# Patient Record
Sex: FEMALE | Race: WHITE | NOT HISPANIC OR LATINO | Employment: FULL TIME | URBAN - METROPOLITAN AREA
[De-identification: names, ages, dates, MRNs, and addresses within clinical notes are randomized per-mention and may not be internally consistent; named-entity substitution may affect disease eponyms.]

---

## 2017-03-27 ENCOUNTER — TELEPHONE (OUTPATIENT)
Dept: NEUROLOGY | Facility: CLINIC | Age: 32
End: 2017-03-27

## 2017-03-27 NOTE — TELEPHONE ENCOUNTER
----- Message from Vandana Betancourt sent at 3/27/2017  1:31 PM CDT -----  Contact: pt [phone] or [phone](dona)  Pt is calling to schedule a np appt.pls call

## 2017-03-27 NOTE — TELEPHONE ENCOUNTER
Patient was dx with MS in 2015. She is currently seeing Dr. Reyna.     I informed her that she should have office visit notes, MRI reports, and LP results (if available) faxed to this office. Fax number confirmed. I explained that we will call her to schedule once Dr Hernandez has had a chance to review the records. Verbalizes understanding of all.

## 2017-04-05 ENCOUNTER — DOCUMENTATION ONLY (OUTPATIENT)
Dept: NEUROLOGY | Facility: CLINIC | Age: 32
End: 2017-04-05

## 2017-04-05 NOTE — PROGRESS NOTES
Faxed signed consent Release of Medical Information to [facility] at [phone] on 4/5/17

## 2017-04-06 ENCOUNTER — TELEPHONE (OUTPATIENT)
Dept: NEUROLOGY | Facility: CLINIC | Age: 32
End: 2017-04-06

## 2017-04-06 NOTE — TELEPHONE ENCOUNTER
Fax received from [facility]-Dr Reyna with medical records. Placed in Kerri's folder for review. VM left for pt to let her know.

## 2017-04-26 ENCOUNTER — TELEPHONE (OUTPATIENT)
Dept: NEUROLOGY | Facility: CLINIC | Age: 32
End: 2017-04-26

## 2017-06-29 ENCOUNTER — TELEPHONE (OUTPATIENT)
Dept: NEUROLOGY | Facility: CLINIC | Age: 32
End: 2017-06-29

## 2017-06-29 NOTE — TELEPHONE ENCOUNTER
----- Message from Noemy Barnhart sent at 6/29/2017  8:28 AM CDT -----  Contact: Patient [phone]  Patient is calling to see if she can still come to her appt on Monday 7/3/17 without having her MRI results, patient states it is impossible to get in touch with her doctor. Please call

## 2017-07-03 ENCOUNTER — OFFICE VISIT (OUTPATIENT)
Dept: NEUROLOGY | Facility: CLINIC | Age: 32
End: 2017-07-03
Payer: COMMERCIAL

## 2017-07-03 VITALS
SYSTOLIC BLOOD PRESSURE: 113 MMHG | WEIGHT: 140.81 LBS | DIASTOLIC BLOOD PRESSURE: 76 MMHG | HEIGHT: 66 IN | HEART RATE: 67 BPM | BODY MASS INDEX: 22.63 KG/M2

## 2017-07-03 DIAGNOSIS — G35 MS (MULTIPLE SCLEROSIS): Primary | ICD-10-CM

## 2017-07-03 DIAGNOSIS — R90.89 ABNORMAL FINDING ON MRI OF BRAIN: ICD-10-CM

## 2017-07-03 DIAGNOSIS — Z29.89 PROPHYLACTIC IMMUNOTHERAPY: ICD-10-CM

## 2017-07-03 DIAGNOSIS — Z71.89 COUNSELING REGARDING ADVANCED CARE PLANNING AND GOALS OF CARE: ICD-10-CM

## 2017-07-03 PROCEDURE — 99999 PR PBB SHADOW E&M-EST. PATIENT-LVL II: CPT | Mod: PBBFAC,,, | Performed by: PSYCHIATRY & NEUROLOGY

## 2017-07-03 PROCEDURE — 99205 OFFICE O/P NEW HI 60 MIN: CPT | Mod: S$GLB,,, | Performed by: PSYCHIATRY & NEUROLOGY

## 2017-07-03 RX ORDER — CHOLECALCIFEROL (VITAMIN D3) 25 MCG
5000 TABLET ORAL DAILY
COMMUNITY

## 2017-07-03 NOTE — PROGRESS NOTES
"Neurology Consultation    History of present illness:   Ms Perez is a 33 y/o woman self referred to our MS clinic to establish care.     Her first neurological sx started about 5 years ago when she noticed left arm numbness during a run, that went away once she cooled off.  Then in 2013, she developed left LE numbness, tingling and drop foot that lasted one week. Went to ER, and was told she has pinched nerve.  In 2014, she had a miscarriage, and ended up having recurrent sx of LLE numbness and weakness that happened a few weeks after miscarriage.      In Feb 2015, she developed left sided numbness up to her rib cage, with some involvement in the right leg as well.  She had a severe gait disturbance at that time.  She went to [facility], and Brain and C spine MRIs were done which showed lesions typical of MS. No LP was required.  She was given steroids x 5 days, and her gait improved. [facility]u in [facility] and was started on Copaxone.  After 6 mo on Copaxone, pt had an episode vertigo/dizziness and new MRI showed an active lesion in brain, so it was recommended that she switch DMT. She was switched to Rebif in late 2015, and she has not had a relapse since.  Overall she tolerated this medication but does have IS reactions that are bothersome.     As for permanent impairment, she describes some chronic numbness and "tightness" in left LE.  She is still able to run for exercise.     She has intermittent urgency of bladder, but generally has control of her bladder. She also drinks a lot of water.     Pt states she has not had a period since she was diagnosed with MS.  Under the care of Dr. Alva at [facility] (GYN).      Pt works full time as     She is taking 800 IU of vitamin D      Review of systems:   A complete 15 system ROS was completed by pt, reviewed by me and will be uploaded into the chart.     No past medical history on file.    Past Surgical History:   Procedure Laterality Date    DNC  " "2014       Family History   Problem Relation Age of Onset    No Known Problems Mother     No Known Problems Father        Social History     Social History    Marital status:      Spouse name: N/A    Number of children: N/A    Years of education: N/A     Social History Main Topics    Smoking status: Never Smoker    Smokeless tobacco: Not on file    Alcohol use Not on file    Drug use: Unknown    Sexual activity: Not on file     Other Topics Concern    Not on file     Social History Narrative    No narrative on file       MEDS: reviewed    Review of patient's allergies indicates:   Allergen Reactions    Iodine and iodide containing products Rash     Physical Exam    Vitals:    07/03/17 1029   BP: 113/76   Pulse: 67   Weight: 63.9 kg (140 lb 12.8 oz)   Height: 5' 6" (1.676 m)       In general, the patient is well nourished.    No bruits. Fundi are normal bilaterally.    MENTAL STATUS: language is fluent, normal verbal comprehension, short-term and remote memory is intact, attention is normal, patient is alert and oriented x 3, fund of knowlege is appropriate by vocabulary.     CRANIAL NERVE EXAM:  There is no intrernuclear ophthalmoplegia.  Extraocular muscles are intact. Pupils are equal, round, and reactive to light. No facial asymmetry. Facial sensation is intact bilaterally. There is no dysarthria. Uvula is midline, and palate moves symmetrically. Shoulder shrug intact bilaterlly. Tongue protrusion is midline. Hearing is grossly intact. Neck is supple.     MOTOR EXAM: Normal bulk and tone throughout UE and LE bilaterally.   No pronator drift; rapid sequential movements are normal; Strength is  5/5 in all groups in the lower extremities and upper extremities;    REFLEXES: 2+ and symmetric throughout in all four extremeties; toes are down bilaterally    SENSORY EXAM: Normal to LT and vibration t/u    COORDINATION: Normal finger-to-nose exam     GAIT: Narrow based and stable      IMAGING " (personally reviewed):  MRI of brain, C and T spine reviewed, 11/2016  Brain MRI with mild burden of disease, jonathon negative, no black holes  C spine MRI normal to my eye  T spine MRI with only 2 small areas of increased signal;       LABS:  Lab Results   Component Value Date    WBC 8.44 07/03/2017    HGB 12.0 07/03/2017    HCT 35.0 (L) 07/03/2017    MCV 98 07/03/2017     07/03/2017     CMP  Sodium   Date Value Ref Range Status   02/20/2015 138 136 - 145 mmol/L Final     Potassium   Date Value Ref Range Status   02/20/2015 4.1 3.5 - 5.1 mmol/L Final     Chloride   Date Value Ref Range Status   02/20/2015 105 95 - 110 mmol/L Final     CO2   Date Value Ref Range Status   02/20/2015 20 (L) 23 - 29 mmol/L Final     Glucose   Date Value Ref Range Status   02/20/2015 78 70 - 110 mg/dL Final     BUN, Bld   Date Value Ref Range Status   02/20/2015 6 6 - 20 mg/dL Final     Creatinine   Date Value Ref Range Status   02/20/2015 0.7 0.5 - 1.4 mg/dL Final     Calcium   Date Value Ref Range Status   02/20/2015 9.8 8.7 - 10.5 mg/dL Final     Total Protein   Date Value Ref Range Status   07/03/2017 7.1 6.0 - 8.4 g/dL Final     Albumin   Date Value Ref Range Status   07/03/2017 3.7 3.5 - 5.2 g/dL Final     Total Bilirubin   Date Value Ref Range Status   07/03/2017 0.4 0.1 - 1.0 mg/dL Final     Comment:     For infants and newborns, interpretation of results should be based  on gestational age, weight and in agreement with clinical  observations.  Premature Infant recommended reference ranges:  Up to 24 hours.............<8.0 mg/dL  Up to 48 hours............<12.0 mg/dL  3-5 days..................<15.0 mg/dL  6-29 days.................<15.0 mg/dL       Alkaline Phosphatase   Date Value Ref Range Status   07/03/2017 49 (L) 55 - 135 U/L Final     AST   Date Value Ref Range Status   07/03/2017 24 10 - 40 U/L Final     ALT   Date Value Ref Range Status   07/03/2017 27 10 - 44 U/L Final     Anion Gap   Date Value Ref Range Status    02/20/2015 13 8 - 16 mmol/L Final     eGFR if    Date Value Ref Range Status   02/20/2015 >60 >60 mL/min/1.73 m^2 Final     eGFR if non    Date Value Ref Range Status   02/20/2015 >60 >60 mL/min/1.73 m^2 Final     Comment:     Calculation used to obtain the estimated glomerular filtration  rate (eGFR) is the CKD-EPI equation. Since race is unknown   in our information system, the eGFR values for   -American and Non--American patients are given   for each creatinine result.                ASSESSMENT:        1.   MS               DISCUSSION:   Pt is clinically stable on Rebif, but does have bothersome IS reactions. Discussed option of Avonex and gave info, and she will consider and let us know;  She has no ostensible disability. MRIs will be planned 6 mo after start of Avonex, assuming she makes the switch; we will check vitamin D and supplement accordingly; The the patient was counseled about the importance of vitamin D supplementation in multiple sclerosis, and the fact that recent data suggests that high circulating blood levels of vitamin D has an ameliorating effect on the disease course in multiple sclerosis in general.       RECOMMENDATIONS:  1. Consider switch to Avonex           2. F/u with me in 6 mo;                       Over 50% of the 60 minute visit was spent counseling the patient about her MS and DMT considerations.       MS (multiple sclerosis)  -     Vitamin D; Future  -     CBC auto differential; Future; Expected date: 07/03/2017  -     Hepatic function panel; Future  -     HELPER-SUPPRESSOR RATIO; Future; Expected date: 07/03/2017        Marci Hernandez MD, MPH

## 2017-07-06 ENCOUNTER — PATIENT MESSAGE (OUTPATIENT)
Dept: NEUROLOGY | Facility: CLINIC | Age: 32
End: 2017-07-06

## 2017-07-06 ENCOUNTER — TELEPHONE (OUTPATIENT)
Dept: NEUROLOGY | Facility: CLINIC | Age: 32
End: 2017-07-06

## 2017-07-06 NOTE — TELEPHONE ENCOUNTER
I received the following email from the pt:    I have a question about CBC W/ AUTO DIFFERENTIAL resulted on 7/3/17, 12:47 PM.     Should I be concerned with any of my results that are not in standard range?

## 2017-07-07 ENCOUNTER — DOCUMENTATION ONLY (OUTPATIENT)
Dept: NEUROLOGY | Facility: CLINIC | Age: 32
End: 2017-07-07

## 2017-07-07 DIAGNOSIS — G35 MULTIPLE SCLEROSIS: Primary | ICD-10-CM

## 2017-07-12 ENCOUNTER — TELEPHONE (OUTPATIENT)
Dept: PHARMACY | Facility: CLINIC | Age: 32
End: 2017-07-12

## 2017-07-12 ENCOUNTER — PATIENT MESSAGE (OUTPATIENT)
Dept: NEUROLOGY | Facility: CLINIC | Age: 32
End: 2017-07-12

## 2017-07-12 ENCOUNTER — TELEPHONE (OUTPATIENT)
Dept: NEUROLOGY | Facility: CLINIC | Age: 32
End: 2017-07-12

## 2017-07-12 NOTE — TELEPHONE ENCOUNTER
King sent the following message:    Ok, thank you.  I had co pay assistance with Rebif as well that brought my copay to $0 which I was worried about with transferring to a different medication.  Please let me know if I need to call my insurance or when I should cancel my rebif prescription.

## 2017-07-14 ENCOUNTER — PATIENT MESSAGE (OUTPATIENT)
Dept: NEUROLOGY | Facility: CLINIC | Age: 32
End: 2017-07-14

## 2017-07-17 ENCOUNTER — PATIENT MESSAGE (OUTPATIENT)
Dept: ADMINISTRATIVE | Facility: OTHER | Age: 32
End: 2017-07-17

## 2017-07-19 ENCOUNTER — PATIENT MESSAGE (OUTPATIENT)
Dept: NEUROLOGY | Facility: CLINIC | Age: 32
End: 2017-07-19

## 2017-07-20 NOTE — TELEPHONE ENCOUNTER
Spoke to King and she wants to wait to get Avonex shipment and consult until after [employer] nurse visit is setup. Also, wants to finish the rebif she currently has. Cleared up the [facility] issues and patient does want to fill with [facility]. Confirmed her copay is $0- 004. Will followup with patient accordingly.     Mirian Fair, Pharm.D  Specialty Pharmacy Clinical Pharmacist  [facility]  Phone: [phone]  [phone]

## 2017-07-27 NOTE — TELEPHONE ENCOUNTER
Initial Avonex consult completed on . Avonex shipment pending at this time due to her finishing her Rebif supply on hand. She will be done with last injection of  Rebif on . She is aware of the minimum 3 day washout period between Rebif and starting Avonex. She plans to have [facility] nurse come out for injection training on  (only day that's working with her schedule at this time). I will call her the week of Aug 14th to confirm shipment details.  Patient has [employer] Nurse Educator number: [phone]. Address confirmed, CC on file. Confirmed 2 patient identifiers - name and . Therapy Appropriate.    Counseled patient on administration directions:     First month will be a titration for first 3 weeks. Pt to use AVOSTARTGRIP KIT for appropriate titrated dosing of week 1: 1/4th  dose, week 2: ½ dose. week 3: ¾ dose, week 4: Full dose - she is confirming with [employer] for Avostart kit shipment    Maintenance dose: inject 30mcg into muscle 1 time per week   Take out of the refrigerator 30 minutes prior to injection- allow to come to room temperature   Wash hands with soap and water before and after injection.   Monthly RX will come with gauze, bandaids, and alcohol swabs.   Patient may inject in either the tops or side of thighs or back part of upper arms.   Liquid to be clear without particles or cloudiness.   Patient is to wipe down the injection site with the alcohol pad, wait to dry.  Pinch about a 2 inch fold of skin between the thumb and index finger, insert the needle at a 90 degree angle straight into the skin.  Hold the syringe steady and slowly push down the plunger, then remove the needle.    Patient will use sharps container; once full, per [place] law, she may lock the sharps container and place in the trash. She can then contact the Pharmacy and we will replace the sharps at no additional charge.    Patient was counseled on possible side effects:   · Flu-like symptoms: headache,  weakness, fever, shakes, aches, pain sweating- may pre-medicate with ibuprofen/naproxen or APAP   · Upset stomach, dizziness, back pain, sleepiness and dry mouth  · ER precautions advised for signs and symptoms of allergic reaction  · Serious side effects: depression, liver problems, seizures, infection, thyroid problems    Patient verbalized understanding. Patient did not have any further questions. She was advised to keep a calendar to stay compliant. Patient plans to start  Avonex on 8/21. Consultation included: indication; goals of treatment; administration; storage and handling; side effects; how to handle side effects; the importance of compliance; how to handle missed doses; the importance of laboratory monitoring; the importance of keeping all follow up appointments.  Patient understands to report any medication changes to [facility] and provider. All questions answered and addressed to patients satisfaction. Pharmacist will f/u with patient in 1 week from start, [facility] to contact patient in 3 weeks for refills.    Mirian Fair, Pharm.D  Specialty Pharmacy Clinical Pharmacist  [facility]  Phone: [phone]

## 2017-08-03 NOTE — TELEPHONE ENCOUNTER
pt called to schedule shipment of Avonex titration kit- has not received the avogrip yet but knows she needs to ask [employer] for status and to setup home visit- she will start week of aug 14th- she will call with exact date.    Confirmed she is aware of 3 day wash out period between last Rebif dose and starting Avonex.  Last rebif dose will be taken on 8/5. Understands earliest she can start is 8/9. Will ship 8/9 via [employer] to receive on 8/10 for $0 copay to office address.    Mirian Fair, Pharm.D  Specialty Pharmacy Clinical Pharmacist  [facility]  Phone: [phone]

## 2017-08-22 ENCOUNTER — DOCUMENTATION ONLY (OUTPATIENT)
Dept: NEUROLOGY | Facility: CLINIC | Age: 32
End: 2017-08-22

## 2017-09-06 ENCOUNTER — TELEPHONE (OUTPATIENT)
Dept: PHARMACY | Facility: CLINIC | Age: 32
End: 2017-09-06

## 2017-09-29 ENCOUNTER — TELEPHONE (OUTPATIENT)
Dept: PHARMACY | Facility: CLINIC | Age: 32
End: 2017-09-29

## 2017-10-31 ENCOUNTER — TELEPHONE (OUTPATIENT)
Dept: PHARMACY | Facility: CLINIC | Age: 32
End: 2017-10-31

## 2017-11-28 ENCOUNTER — TELEPHONE (OUTPATIENT)
Dept: PHARMACY | Facility: CLINIC | Age: 32
End: 2017-11-28

## 2017-12-04 ENCOUNTER — TELEPHONE (OUTPATIENT)
Dept: PHARMACY | Facility: HOSPITAL | Age: 32
End: 2017-12-04

## 2017-12-04 NOTE — TELEPHONE ENCOUNTER
Patient contacted to schedule pickup of Avonex. Patient verified he/she has not started any new medications. Patient has not developed any new drug allergies or medical conditions. Patient scheduled her shipment for Avonex. Patient verified no doses were missed in the past 4 weeks and has 1 doses remaining on hand. Patient verified understanding of the refill process and has no additional questions at the time. 12/04/17.

## 2017-12-27 DIAGNOSIS — G35 MULTIPLE SCLEROSIS: ICD-10-CM

## 2017-12-27 RX ORDER — INTERFERON BETA-1A 30MCG/.5ML
KIT INTRAMUSCULAR
Refills: 0 | Status: CANCELLED | OUTPATIENT
Start: 2017-12-27

## 2017-12-28 ENCOUNTER — TELEPHONE (OUTPATIENT)
Dept: PHARMACY | Facility: CLINIC | Age: 32
End: 2017-12-28

## 2017-12-28 NOTE — TELEPHONE ENCOUNTER
Patient called to advise of need for new rx from provider and to conduct clinical f/u.  N/a - lvm for call back.     Omar Thorpe, GINO.Ph.  Clinical Pharmacist  [facility]  Phone: [phone]

## 2018-01-03 DIAGNOSIS — Z79.899 HIGH RISK MEDICATION USE: ICD-10-CM

## 2018-01-03 DIAGNOSIS — G35 MULTIPLE SCLEROSIS: Primary | ICD-10-CM

## 2018-01-03 NOTE — TELEPHONE ENCOUNTER
----- Message from Jhonnyade Mayer sent at 1/3/2018 10:56 AM CST -----  Contact: Patient @ [phone]  Patient needs a refill on (interferon beta-1a (AVONEX) 30 mcg/0.5 mL injection ) pt is out of medication     [facility] - [facility] - [address]  [address] [address]  Phone: [phone] Fax: [phone]

## 2018-01-04 NOTE — TELEPHONE ENCOUNTER
Avonex PENs refill arrangement and f/u completed. Confirmed 2 patient identifiers - name and . He/she has 0 doses remaining, next dose needed 18. No side effects or missed doses reported. She confirms no changes to insurance or health and has no further questions at this time. Patient asked to have medication shipped on 18 to arrive at patient's home on 18. $0 copay (004). Address confirmed -business address, signature required. QoL questions answered. Patient has been on the medication for a while, and had no further questions or concerns - everything is as usual, nothing new. She will see MD on 18. [facility] will continue to reach out to patient monthly to arrange refills.

## 2018-01-08 ENCOUNTER — OFFICE VISIT (OUTPATIENT)
Dept: NEUROLOGY | Facility: CLINIC | Age: 33
End: 2018-01-08
Payer: COMMERCIAL

## 2018-01-08 VITALS
SYSTOLIC BLOOD PRESSURE: 107 MMHG | WEIGHT: 136 LBS | BODY MASS INDEX: 21.86 KG/M2 | DIASTOLIC BLOOD PRESSURE: 73 MMHG | HEIGHT: 66 IN | HEART RATE: 63 BPM

## 2018-01-08 DIAGNOSIS — G35 MS (MULTIPLE SCLEROSIS): Primary | ICD-10-CM

## 2018-01-08 DIAGNOSIS — F43.20 ADJUSTMENT DISORDER, UNSPECIFIED TYPE: ICD-10-CM

## 2018-01-08 DIAGNOSIS — Z29.89 PROPHYLACTIC IMMUNOTHERAPY: ICD-10-CM

## 2018-01-08 DIAGNOSIS — Z71.89 COUNSELING REGARDING GOALS OF CARE: ICD-10-CM

## 2018-01-08 PROCEDURE — 99999 PR PBB SHADOW E&M-EST. PATIENT-LVL III: CPT | Mod: PBBFAC,,, | Performed by: PSYCHIATRY & NEUROLOGY

## 2018-01-08 PROCEDURE — 99215 OFFICE O/P EST HI 40 MIN: CPT | Mod: S$GLB,,, | Performed by: PSYCHIATRY & NEUROLOGY

## 2018-01-08 RX ORDER — NORETHINDRONE ACETATE AND ETHINYL ESTRADIOL, ETHINYL ESTRADIOL AND FERROUS FUMARATE 1MG-10(24)
KIT ORAL
Refills: 3 | COMMUNITY
Start: 2017-12-26 | End: 2018-01-29 | Stop reason: CLARIF

## 2018-01-08 NOTE — PROGRESS NOTES
Subjective:       Patient ID: King Perez is a 32 y.o. female who presents today for a routine clinic visit for MS.      MS HPI:  · DMT: Avonex  · Side effects from DMT? Yes - flu like symptoms; taking 800mg of ibuprofen about 1/2 hour before injection;  Suffering flu-like symptoms the next morning;    · Taking vitamin D3 as recommended? Yes -  Dose: 2,000 IU /day   · She denies any new neurologic symptoms;    · Only issue is the flu like sx;   · To be remembered is that she took Copaxone in the past, and did not like the way she felt;     SOCIAL HISTORY  Social History   Substance Use Topics    Smoking status: Never Smoker    Smokeless tobacco: Not on file    Alcohol use Not on file     Living arrangements - the patient lives with their spouse.  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;     MS ROS:  · Fatigue: No  · Sleep Disturbance: Yes - some sleep issues on the nights after her shots, and some insomnia in general; wondering if its anxiety; interesting in counseling;   · Bladder Dysfunction: No  · Bowel Dysfunction: No  · Spasticity: Yes - some in her legs; stretches; practices yoga which helps;    · Visual Symptoms: No--feels this has gotten better  · Cognitive: Yes - some days better than others;   · Mood Disorder: Yes - a little anxiety; started her own business;    · Gait Disturbance: No  · Falls: No  · Hand Dysfunction: No  · Pain: Yes - gets bilateral hip pain after her Avonex shots--morning after; part of her flu like sx;   · Sexual Dysfunction: Not Assessed  · Skin Breakdown: No  · Tremors: No  · Dysphagia:  No  · Dysarthria:  No  · Heat sensitivity:  Yes -  And cold sensitivity;   · Any un-met adaptive needs? No  · Copay Assist?  Yes - $0  · Clinical Trial candidate? No      Objective:        25 foot timed walk: 3.99 seconds; can hop well on each foot 10 x    Neurologic Exam      MENTAL STATUS: grossly intact  CRANIAL NERVE EXAM: There is no internuclear ophthalmoplegia.  Extraocular   muscles are intact.  No facial   asymmetry.There is no dysarthria. Color vision 18/18 each eye; 20/20 vision OD and OS  MOTOR EXAM: Normal bulk and tone throughout UE and LE bilaterally. Rapid sequential movements are normal. Strength is 5/5 in all groups   in the lower extremities and upper extremities.   REFLEXES: Symmetric and 2+ throughout in all 4 extremities.   SENSORY EXAM: Normal to vibration t/o  COORDINATION: Normal finger-to-nose exam.   GAIT: Narrow based and stable.      Imaging:     Results for orders placed during the hospital encounter of 11/07/16   MRI Brain W WO Contrast    Impression    Scattered foci of high flair/T2 signal within periventricular and subcortical white matter not significantly changed from previous exam and consistent with provided history of multiple sclerosis.  No enhancement is seen to indicate active demyelination.        Electronically signed by: PALAK ROBERTS MD  Date:     11/07/16  Time:    13:19      Results for orders placed during the hospital encounter of 11/07/16   MRI Cervical Spine W WO Cont    Impression  Scattered foci of high T2/STIR signal within the cervical and thoracic spinal cord appear grossly similar when compared to previous exam, allowing for some study limitations due to motion artifact.  Findings are consistent with provided history of multiple sclerosis.  No enhancement is seen to indicate active demyelination.      Electronically signed by: PALAK ROBERTS MD  Date:     11/07/16  Time:    13:32      Results for orders placed during the hospital encounter of 11/07/16   MRI Thoracic Spine W WO Contrast    Impression  See MRI Cervical Spine W WO Contrast study report done of same date for interpretation.      Electronically signed by: PALAK ROBERTS MD  Date:     01/03/17  Time:    11:40          Labs:     Lab Results   Component Value Date    RKAKCUBT38DD 51 07/03/2017    JQHRNQUU97YT 19 (L) 02/21/2015     No results found for:  JCVINDEX, JCVANTIBODY  Lab Results   Component Value Date    XA8LADNN 75.0 07/03/2017    ABSOLUTECD3 1736 07/03/2017    HN1RNKTG 22.8 07/03/2017    ABSOLUTECD8 528 07/03/2017    GY4NVGMV 52.5 07/03/2017    ABSOLUTECD4 1214 07/03/2017    LABCD48 2.30 07/03/2017     Lab Results   Component Value Date    WBC 8.44 07/03/2017    RBC 3.56 (L) 07/03/2017    HGB 12.0 07/03/2017    HCT 35.0 (L) 07/03/2017    MCV 98 07/03/2017    MCH 33.7 (H) 07/03/2017    MCHC 34.3 07/03/2017    RDW 12.2 07/03/2017     07/03/2017    MPV 9.6 07/03/2017    GRAN 5.7 07/03/2017    GRAN 67.8 07/03/2017    LYMPH 2.3 07/03/2017    LYMPH 26.9 07/03/2017    MONO 0.4 07/03/2017    MONO 4.3 07/03/2017    EOS 0.1 07/03/2017    BASO 0.02 07/03/2017    EOSINOPHIL 0.6 07/03/2017    BASOPHIL 0.2 07/03/2017     Sodium   Date Value Ref Range Status   02/20/2015 138 136 - 145 mmol/L Final     Potassium   Date Value Ref Range Status   02/20/2015 4.1 3.5 - 5.1 mmol/L Final     Chloride   Date Value Ref Range Status   02/20/2015 105 95 - 110 mmol/L Final     CO2   Date Value Ref Range Status   02/20/2015 20 (L) 23 - 29 mmol/L Final     Glucose   Date Value Ref Range Status   02/20/2015 78 70 - 110 mg/dL Final     BUN, Bld   Date Value Ref Range Status   02/20/2015 6 6 - 20 mg/dL Final     Creatinine   Date Value Ref Range Status   02/20/2015 0.7 0.5 - 1.4 mg/dL Final     Calcium   Date Value Ref Range Status   02/20/2015 9.8 8.7 - 10.5 mg/dL Final     Total Protein   Date Value Ref Range Status   07/03/2017 7.1 6.0 - 8.4 g/dL Final     Albumin   Date Value Ref Range Status   07/03/2017 3.7 3.5 - 5.2 g/dL Final     Total Bilirubin   Date Value Ref Range Status   07/03/2017 0.4 0.1 - 1.0 mg/dL Final     Comment:     For infants and newborns, interpretation of results should be based  on gestational age, weight and in agreement with clinical  observations.  Premature Infant recommended reference ranges:  Up to 24 hours.............<8.0 mg/dL  Up to 48  hours............<12.0 mg/dL  3-5 days..................<15.0 mg/dL  6-29 days.................<15.0 mg/dL       Alkaline Phosphatase   Date Value Ref Range Status   2017 49 (L) 55 - 135 U/L Final     AST   Date Value Ref Range Status   2017 24 10 - 40 U/L Final     ALT   Date Value Ref Range Status   2017 27 10 - 44 U/L Final     Anion Gap   Date Value Ref Range Status   2015 13 8 - 16 mmol/L Final     eGFR if    Date Value Ref Range Status   2015 >60 >60 mL/min/1.73 m^2 Final     eGFR if non    Date Value Ref Range Status   2015 >60 >60 mL/min/1.73 m^2 Final     Comment:     Calculation used to obtain the estimated glomerular filtration  rate (eGFR) is the CKD-EPI equation. Since race is unknown   in our information system, the eGFR values for   -American and Non--American patients are given   for each creatinine result.         Diagnosis/Assessment/Plan:    1. Multiple Sclerosis  · Assessment: Pt is clinically stable on Avonex; she is having some flu like sx, however; advised that she try taking 2 Aleve's 1 hour prior to injection instead of ibuprofen  · Imagin mo interval MRI brain on Avonex planned 2018  · Disease Modifying Therapies: continue Avonex and high dose D3.     2. MS Symptom Assessment / Management  · Mood Disorder: refer to Cheryle Hennessy LCSW    Over 50% of this 40 minute visit was spent in direct face to face counseling of the patient about MS, DMT considerations, and MS symptom management.     F/u with me in 6 mo;   Kerri Orozco PA-C also in her POD    MS (multiple sclerosis)  -     Ambulatory Referral to Multiple Sclerosis Social Work  -     CBC auto differential; Future; Expected date: 2018  -     Hepatic function panel; Future  -     MRI Brain W WO Contrast; Future; Expected date: 2018    Adjustment disorder, unspecified type  -     Ambulatory Referral to [facility] Social  Work

## 2018-01-15 ENCOUNTER — LAB VISIT (OUTPATIENT)
Dept: LAB | Facility: HOSPITAL | Age: 33
End: 2018-01-15
Attending: PSYCHIATRY & NEUROLOGY
Payer: COMMERCIAL

## 2018-01-15 DIAGNOSIS — G35 MULTIPLE SCLEROSIS: ICD-10-CM

## 2018-01-15 DIAGNOSIS — Z79.899 HIGH RISK MEDICATION USE: ICD-10-CM

## 2018-01-15 LAB
ALBUMIN SERPL BCP-MCNC: 3.6 G/DL
ALP SERPL-CCNC: 37 U/L
ALT SERPL W/O P-5'-P-CCNC: 28 U/L
AST SERPL-CCNC: 18 U/L
BASOPHILS # BLD AUTO: 0.02 K/UL
BASOPHILS NFR BLD: 0.4 %
BILIRUB DIRECT SERPL-MCNC: 0.3 MG/DL
BILIRUB SERPL-MCNC: 0.4 MG/DL
DIFFERENTIAL METHOD: ABNORMAL
EOSINOPHIL # BLD AUTO: 0 K/UL
EOSINOPHIL NFR BLD: 0.7 %
ERYTHROCYTE [DISTWIDTH] IN BLOOD BY AUTOMATED COUNT: 11.7 %
HCT VFR BLD AUTO: 33.9 %
HGB BLD-MCNC: 11.7 G/DL
IMM GRANULOCYTES # BLD AUTO: 0.01 K/UL
IMM GRANULOCYTES NFR BLD AUTO: 0.2 %
LYMPHOCYTES # BLD AUTO: 2.4 K/UL
LYMPHOCYTES NFR BLD: 44.6 %
MCH RBC QN AUTO: 33.9 PG
MCHC RBC AUTO-ENTMCNC: 34.5 G/DL
MCV RBC AUTO: 98 FL
MONOCYTES # BLD AUTO: 0.3 K/UL
MONOCYTES NFR BLD: 5 %
NEUTROPHILS # BLD AUTO: 2.7 K/UL
NEUTROPHILS NFR BLD: 49.1 %
NRBC BLD-RTO: 0 /100 WBC
PLATELET # BLD AUTO: 151 K/UL
PMV BLD AUTO: 10.7 FL
PROT SERPL-MCNC: 7.1 G/DL
RBC # BLD AUTO: 3.45 M/UL
WBC # BLD AUTO: 5.42 K/UL

## 2018-01-15 PROCEDURE — 80076 HEPATIC FUNCTION PANEL: CPT

## 2018-01-15 PROCEDURE — 85025 COMPLETE CBC W/AUTO DIFF WBC: CPT

## 2018-01-15 PROCEDURE — 36415 COLL VENOUS BLD VENIPUNCTURE: CPT

## 2018-01-16 ENCOUNTER — PATIENT MESSAGE (OUTPATIENT)
Dept: NEUROLOGY | Facility: CLINIC | Age: 33
End: 2018-01-16

## 2018-01-16 DIAGNOSIS — G35 MULTIPLE SCLEROSIS: Primary | ICD-10-CM

## 2018-01-17 ENCOUNTER — PATIENT MESSAGE (OUTPATIENT)
Dept: NEUROLOGY | Facility: CLINIC | Age: 33
End: 2018-01-17

## 2018-01-17 ENCOUNTER — TELEPHONE (OUTPATIENT)
Dept: NEUROLOGY | Facility: CLINIC | Age: 33
End: 2018-01-17

## 2018-01-17 NOTE — TELEPHONE ENCOUNTER
----- Message from Marci Hernandez MD sent at 1/8/2018 10:16 AM CST -----  Referring to you for counseling :).  Lets discuss; thanks

## 2018-01-17 NOTE — TELEPHONE ENCOUNTER
Received referral from Dr. Hernandez, advising that pt is interested in counseling.  Left voicemail for pt to call.

## 2018-01-25 DIAGNOSIS — G35 MULTIPLE SCLEROSIS: ICD-10-CM

## 2018-01-26 ENCOUNTER — TELEPHONE (OUTPATIENT)
Dept: PHARMACY | Facility: CLINIC | Age: 33
End: 2018-01-26

## 2018-01-29 ENCOUNTER — OFFICE VISIT (OUTPATIENT)
Dept: OBSTETRICS AND GYNECOLOGY | Facility: CLINIC | Age: 33
End: 2018-01-29
Payer: COMMERCIAL

## 2018-01-29 ENCOUNTER — INITIAL CONSULT (OUTPATIENT)
Dept: NEUROLOGY | Facility: CLINIC | Age: 33
End: 2018-01-29
Payer: COMMERCIAL

## 2018-01-29 VITALS
BODY MASS INDEX: 23.25 KG/M2 | DIASTOLIC BLOOD PRESSURE: 60 MMHG | WEIGHT: 139.56 LBS | SYSTOLIC BLOOD PRESSURE: 100 MMHG | HEIGHT: 65 IN

## 2018-01-29 DIAGNOSIS — F43.22 ADJUSTMENT DISORDER WITH ANXIOUS MOOD: Primary | ICD-10-CM

## 2018-01-29 DIAGNOSIS — Z30.41 ENCOUNTER FOR SURVEILLANCE OF CONTRACEPTIVE PILLS: ICD-10-CM

## 2018-01-29 DIAGNOSIS — Z01.419 WELL WOMAN EXAM WITH ROUTINE GYNECOLOGICAL EXAM: Primary | ICD-10-CM

## 2018-01-29 DIAGNOSIS — G35 MULTIPLE SCLEROSIS, RELAPSING-REMITTING: ICD-10-CM

## 2018-01-29 PROBLEM — Z98.890 HISTORY OF COLPOSCOPY: Status: ACTIVE | Noted: 2018-01-29

## 2018-01-29 PROCEDURE — 90791 PSYCH DIAGNOSTIC EVALUATION: CPT | Mod: S$GLB,,, | Performed by: SOCIAL WORKER

## 2018-01-29 PROCEDURE — 99999 PR PBB SHADOW E&M-EST. PATIENT-LVL III: CPT | Mod: PBBFAC,,, | Performed by: NURSE PRACTITIONER

## 2018-01-29 PROCEDURE — 99385 PREV VISIT NEW AGE 18-39: CPT | Mod: S$GLB,,, | Performed by: NURSE PRACTITIONER

## 2018-01-29 RX ORDER — INTERFERON BETA-1A 30MCG/.5ML
30 KIT INTRAMUSCULAR
Qty: 1 KIT | Refills: 0 | Status: SHIPPED | OUTPATIENT
Start: 2018-01-29 | End: 2018-02-22 | Stop reason: SDUPTHER

## 2018-01-29 RX ORDER — VALACYCLOVIR HYDROCHLORIDE 1 G/1
TABLET, FILM COATED ORAL
Refills: 1 | COMMUNITY
Start: 2018-01-22 | End: 2018-12-03 | Stop reason: SDUPTHER

## 2018-01-29 RX ORDER — LEVONORGESTREL AND ETHINYL ESTRADIOL 0.1-0.02MG
1 KIT ORAL DAILY
Qty: 90 TABLET | Refills: 3 | Status: SHIPPED | OUTPATIENT
Start: 2018-01-29 | End: 2018-06-29

## 2018-01-29 NOTE — PROGRESS NOTES
CC: Annual  HPI: Pt is a 32 y.o.  female who presents for routine annual exam. She is a new patient to me. She uses OCPs for contraception. She does not want STD screening. Hx of abnormal pap with a colpo about 8 years ago. She has had normal ones since and the last one was in 1/2017 WNL. She has been out of her OCPs for a week now. She has been on Lo Loestrin for years but her new insurance no longer covers it. She would like a low dose pill similar to Lo Loestrin. Paternal GM had breast cancer-dx after 50. No problems today. She does not have cycles with OCPs. She is followed by [facility] for her MS.       ROS:  GENERAL: Feeling well overall. Denies fever or chills.   SKIN: Denies rash or lesions.   HEAD: Denies head injury or headache.   NODES: Denies enlarged lymph nodes.   CHEST: Denies chest pain or shortness of breath.   CARDIOVASCULAR: Denies palpitations or left sided chest pain.   ABDOMEN: No abdominal pain, constipation, diarrhea, nausea, vomiting or rectal bleeding.   URINARY: No dysuria, hematuria, or burning on urination.  REPRODUCTIVE: See HPI.   BREASTS: Denies pain, lumps, or nipple discharge.   HEMATOLOGIC: No easy bruisability or excessive bleeding.   MUSCULOSKELETAL: Denies joint pain or swelling.   NEUROLOGIC: Denies syncope or weakness.   PSYCHIATRIC: Denies depression, anxiety or mood swings.    PE:   APPEARANCE: Well nourished, well developed, White female in no acute distress.  NODES: no cervical, supraclavicular, or inguinal lymphadenopathy  BREASTS: Symmetrical, no skin changes or visible lesions. No palpable masses, nipple discharge or adenopathy bilaterally.  ABDOMEN: Soft. No tenderness or masses. No distention. No hernias palpated. No CVA tenderness.  VULVA: No lesions. Normal external female genitalia.  URETHRAL MEATUS: Normal size and location, no lesions, no prolapse.  URETHRA: No masses, tenderness, or prolapse.  VAGINA: Moist. No lesions or lacerations noted. No abnormal  discharge present. No odor present.   CERVIX: No lesions or discharge. No cervical motion tenderness.   UTERUS: Normal size, regular shape, mobile, non-tender.  ADNEXA: No tenderness. No fullness or masses palpated in the adnexal regions.   ANUS PERINEUM: Normal.      Diagnosis:  1. Well woman exam with routine gynecological exam    2. Encounter for surveillance of contraceptive pills    3. Multiple sclerosis, relapsing-remitting        Plan:     Orders Placed This Encounter    levonorgestrel-ethinyl estradiol (AVIANE,ALESSE,LESSINA) 0.1-20 mg-mcg per tablet     -pap current  -ocps refilled  -The use of the oral contraceptive has been fully discussed with the patient. This includes the proper method to initiate and continue the pills, the need for regular compliance to ensure adequate contraceptive effect, the physiology which make the pill effective, the instructions for what to do in event of a missed pill, and warnings about anticipated minor side effects such as breakthrough spotting, nausea, breast tenderness, weight changes, acne, headaches, etc.  She has been told of the more serious potential side effects such as MI, stroke, and deep vein thrombosis, all of which are very unlikely.  She has been asked to report any signs of such serious problems immediately.  She should back up the pill with a condom during any cycle in which antibiotics are prescribed, and during the first cycle as well. The need for additional protection, such as a condom, to prevent exposure to sexually transmitted diseases has also been discussed- the patient has been clearly reminded that OCP's cannot protect her against diseases such as HIV and others. She understands and wishes to take the medication as prescribed.     Patient was counseled today on the new ACS guidelines for cervical cytology screening as well as the current recommendations for breast cancer screening. She was counseled to follow up with her PCP for other routine  health maintenance. Counseling session lasted approximately 10 minutes, and all her questions were answered.    Follow-up with me in 1 year for routine exam; pap in 2 years.

## 2018-01-29 NOTE — PROGRESS NOTES
"Psychiatry Initial Visit (PhD/LCSW)  Diagnostic Interview - CPT 58447    Date: 1/29/2018    Site: [facility]    Referral source: Marci Hernandez MD, Neurologist at [facility]    Clinical status of patient: Outpatient    King Perez, a 32 y.o. female, for initial evaluation visit.  Met with patient and additional information was obtained from a review of available medical records.    Chief complaint/reason for encounter: anxiety     History of present illness:   King Perez is a patient at [facility] and was referred to counseling for evaluation of anxiety and adjustment.  She has experienced the following anxiety symptoms for the past two months with intensification over the past few weeks: sleep disturbance with difficulty staying asleep (waking up ~ 3:30am daily and feeling anxious/worried and experiencing heart palpitations), worry/anxiety about a range of issues & difficulty concentrating.  She reports increased apprehension leading up to her weekly injection of Avonex because it makes her feel physically ill and "off".  She also experiences the following depressive symptoms: hopelessness, increased tearfulness.  She denies changes in weight or appetite, suicidal ideation or thoughts of death, loss of interest or pleasure in activities.  She reports depressive symptoms a few days/week.  King denies symptoms causing significant distress or impairment in social, occupational or other aspects of her functioning but does describe significant internal distress as she experiences guilt and disappointment as she is unable to fully enjoy the many positive aspects of her personal and professional life, at this time.  King reports a prior history of treatment for adjustment-related anxiety, as described below.    Pain: King describes some spasticity in legs.    Symptoms:   · Mood: insomnia and tearfulness  · Anxiety: excessive anxiety/worry, " restlessness/keyed up and panic symptoms (palpitations)  · Substance abuse: denied  · Cognitive functioning: reports difficulty with multi-tasking and tolerating distractions since diagnosis of MS  · Health behaviors: positive health behaviors include regular exercise (yoga, running)    Psychiatric history: has participated in counseling/psychotherapy on an outpatient basis in the past   King sought help for anxiety after moving away from home, when she was a young adult.  She described being home-sick and having panic symptoms, including feeling short of breath and heart palpitations.  She visited the emergency room a few times.  Once she was prescribed medication, which interfered with her daily functioning, and on another occasion, she met with a physician, who taught her a breathing exercise that she found helpful.  Approximately 5 years ago, King had a miscarriage and sought counseling.  She participated for 2 months and states it helped her think more realistically about her expectations of others.      Medical history: King was diagnosed with multiple sclerosis in 2015.  She describes cognitive symptoms (difficulty multi-tasking, focusing with distractions), some lower extremity spasticity and balance issues.  She has taken Copaxone, Rebif and is currently prescribed Avonex.  She experiences flu-like symptoms the day following her injection.  She denies any other significant medical history.    Family history of psychiatric illness: Mother with alcohol abuse following the death of her own mother.  Sober for years.  No other known family history of psychiatric illness.    Social history (marriage, employment, etc.): King was born and raised in the [place] area.  She is an only child and lived with her parents until they  when she was ~ 16 y.o.  Both parents have remarried.  King describes a good childhood and close relationships with both parents.  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  She and her  have been  for 12 years.  They lived outside [place] for a few years but have been living locally for the past 10 years.  They do not have children and lost a child to miscarriage about 5 years ago.  This was difficult as King describes how, though unexpected, they were excited.  She described losing friends during that time as they did not know how to support her through the experience.  King and her  have two dogs and enjoying spending a lot of time together.  Additionally, King practices yoga and enjoys running.  She also enjoys reading and other hobbies but has put them aside since she opened her salon.  (She expects her schedule to lighten beginning in March).  King was raised [employer] but considers herself spiritual and not adherent to any particular Yarsani practice.  She enjoys journaling.    Substance use:   Alcohol: 2-3 glasses per work week, may share a bottle with friends during weekend gatherings, reports some increased alcohol consumption of late   Drugs: uses marijuana 1-2x/month for spasticity and anxiety   Tobacco: none   Caffeine: not assessed    Current medications and drug reactions (include OTC, herbal): see medication list     Strengths and liabilities: Strength: Patient accepts guidance/feedback, Strength: Patient is expressive/articulate., Strength: Patient is intelligent., Strength: Patient is physically healthy., Strength: Patient has positive support network., Strength: Patient has reasonable judgment.    Current Evaluation:     Mental Status Exam:  General Appearance:  age appropriate, normal weight, casually dressed   Speech: normal tone, normal rate, normal pitch, normal volume      Level of Cooperation: cooperative      Thought Processes: normal and logical   Mood: mild-moderate dysphoria      Thought Content: normal, no suicidality,  no homicidality, delusions, or paranoia   Affect: mood-congruent   Orientation: Oriented x3   Memory: intact   Attention Span & Concentration: pt reports some difficulty with concentration since diagnosis of MS   Fund of General Knowledge: intact and appropriate to age and level of education   Abstract Reasoning: intact   Judgment & Insight: good     Language  intact     Diagnostic Impression - Plan:       ICD-10-CM ICD-9-CM   1. Adjustment disorder with anxious mood F43.22 309.24       Plan:individual psychotherapy    Return to Clinic: 1 week    Length of Service (minutes): 60

## 2018-02-14 ENCOUNTER — OFFICE VISIT (OUTPATIENT)
Dept: NEUROLOGY | Facility: CLINIC | Age: 33
End: 2018-02-14
Payer: COMMERCIAL

## 2018-02-14 DIAGNOSIS — F43.22 ADJUSTMENT DISORDER WITH ANXIOUS MOOD: Primary | ICD-10-CM

## 2018-02-14 PROCEDURE — 90834 PSYTX W PT 45 MINUTES: CPT | Mod: S$GLB,,, | Performed by: SOCIAL WORKER

## 2018-02-14 NOTE — PROGRESS NOTES
"Individual Psychotherapy (PhD/LCSW)    2/14/2018    Site:  [facility]         Therapeutic Intervention: Met with patient.  Outpatient - Insight oriented psychotherapy 45 min - CPT code 77669 and Outpatient - Supportive psychotherapy 45 min - CPT Code 25997    Chief complaint/reason for encounter: anxiety     Interval history and content of current session: King presented to session casually dressed and well-groomed.  Reports mood as stable but still describes feeling "off" since starting Avonex.  She has not explored the mindfulness exercises on the [facility] web site, but states that she did increase her journaling with a focus on writing out her worries each day and states this has been helpful.  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  She recognizes that it's difficult for her to relinquish control over issues, even though she trusts her co-owners to effectively manage problems in her absence.  She recognizes a discomfort when she is not in control.  This loss of control and the reality of having MS also surfaces each time she injects Avonex and feels ill.  SW provided education on CBT thought record and common cognitive distortions and gave an example of how to use the chart to explore and challenge unhelpful thoughts.  King immediately recognized some of her most common cognitive distortions including all-or-nothing thinking, should statements and personalization.  She will continue to use the thought record or concepts in her journaling and we will explore more, next session.    Treatment plan:  · Target symptoms: anxiety   · Why chosen therapy is appropriate versus another modality: relevant to diagnosis, patient responds to this modality, evidence based practice  · Outcome monitoring methods: self-report  · Therapeutic intervention type: insight oriented " psychotherapy, supportive psychotherapy    Risk parameters:  Patient reports no suicidal ideation  Patient reports no homicidal ideation  Patient reports no self-injurious behavior  Patient reports no violent behavior    Verbal deficits: None    Patient's response to intervention:  The patient's response to intervention is accepting.    Progress toward goals and other mental status changes:  The patient's progress toward goals is limited, as this is only our second session..    Diagnosis:   No diagnosis found.    Plan:  individual psychotherapy and patient is considering a change in MS DMT 2/2 unpleasant side effects, including general feeling of malaise    Return to clinic: 2 weeks    Length of Service (minutes): 45

## 2018-02-22 DIAGNOSIS — G35 MULTIPLE SCLEROSIS: ICD-10-CM

## 2018-02-23 ENCOUNTER — TELEPHONE (OUTPATIENT)
Dept: PHARMACY | Facility: CLINIC | Age: 33
End: 2018-02-23

## 2018-02-26 ENCOUNTER — TELEPHONE (OUTPATIENT)
Dept: NEUROLOGY | Facility: CLINIC | Age: 33
End: 2018-02-26

## 2018-02-26 ENCOUNTER — OFFICE VISIT (OUTPATIENT)
Dept: NEUROLOGY | Facility: CLINIC | Age: 33
End: 2018-02-26
Payer: COMMERCIAL

## 2018-02-26 ENCOUNTER — PATIENT MESSAGE (OUTPATIENT)
Dept: NEUROLOGY | Facility: CLINIC | Age: 33
End: 2018-02-26

## 2018-02-26 DIAGNOSIS — F43.22 ADJUSTMENT DISORDER WITH ANXIETY: Primary | ICD-10-CM

## 2018-02-26 PROCEDURE — 90834 PSYTX W PT 45 MINUTES: CPT | Mod: S$GLB,,, | Performed by: SOCIAL WORKER

## 2018-02-26 NOTE — PROGRESS NOTES
"Individual Psychotherapy (PhD/LCSW)    2/26/2018    Site:  [facility]         Therapeutic Intervention: Met with patient.  Outpatient - Insight oriented psychotherapy 45 min - CPT code 84030 and Outpatient - Supportive psychotherapy 45 min - CPT Code 26980    Chief complaint/reason for encounter: anxiety     Interval history and content of current session: King presented to session neatly dressed and well-groomed.  Mood was reported as stable with one day of increased anxiety because she did not follow her own plan to reduce hours at work.  States she felt increased anxiety and worry about many other issues "that I felt I had control over"; restlessness and irritability (not expressed toward others) and fatigue.  She states that she had planned her schedule such that on a shorter work day, she would work intensively and on a longer day, she would space out activities more.  However, she did the complete opposite and then experienced increased fatigue - taking a nap for a few hours after work, which is unusual for her.  King has continued journaling and did utilize the first part of the thought record.  Through this process she found herself thinking very self-defeating thoughts and deeper beliefs about being undeserving of good things.  Additionally, she experiences a lot of guilt.  We explored these themes but King was unable to connect these beliefs with any significant life experiences or her upbringing, though she recognizes they have been present for a long time.  We discussed ways to challenge these deeper beliefs and thoughts.  She will continue this through journaling and we will continue to explore these themes in future visits.      Treatment plan:  · Target symptoms: anxiety   · Why chosen therapy is appropriate versus another modality: relevant to diagnosis, patient responds to this modality, evidence based practice  · Outcome monitoring methods: self-report  · Therapeutic intervention type: " insight oriented psychotherapy, supportive psychotherapy    Risk parameters:  Patient reports no suicidal ideation  Patient reports no homicidal ideation  Patient reports no self-injurious behavior  Patient reports no violent behavior    Verbal deficits: None    Patient's response to intervention:  The patient's response to intervention is accepting, motivated.    Progress toward goals and other mental status changes:  The patient's progress toward goals is good.    Diagnosis:     ICD-10-CM ICD-9-CM   1. Adjustment disorder with anxiety F43.22 309.24   Rule out Generalized Anxiety Disorder    Plan:  individual psychotherapy and patient is considering a change in MS DMT 2/2 unpleasant side effects, including general feeling of malaise    Return to clinic: 2 weeks    Length of Service (minutes): 45

## 2018-02-26 NOTE — TELEPHONE ENCOUNTER
----- Message from Sydney Drummond sent at 2/26/2018 12:04 PM CST -----  Contact: [facility] @ [phone]  Calling for refill on AVONEX 30 mcg/0.5 mL PnKt    [facility] - [facility] - [address]  [address] [address]  Phone: [phone] Fax: [phone]

## 2018-02-27 RX ORDER — INTERFERON BETA-1A 30MCG/.5ML
KIT INTRAMUSCULAR
Qty: 3 KIT | Refills: 1 | Status: SHIPPED | OUTPATIENT
Start: 2018-02-27 | End: 2018-06-29

## 2018-02-28 ENCOUNTER — PATIENT MESSAGE (OUTPATIENT)
Dept: NEUROLOGY | Facility: CLINIC | Age: 33
End: 2018-02-28

## 2018-02-28 NOTE — TELEPHONE ENCOUNTER
King sent the following message:    I have my MRI coming up on Monday 3/5 and in the past, my previous doctor would prescribe something for me to help with anxiety before it.  Also, I have given thought to changing my medication still from Avonex and back to Rebif due to the side effects and have made the decision to go back to Rebif.

## 2018-03-01 ENCOUNTER — PATIENT MESSAGE (OUTPATIENT)
Dept: NEUROLOGY | Facility: CLINIC | Age: 33
End: 2018-03-01

## 2018-03-01 NOTE — TELEPHONE ENCOUNTER
Pt sent the following message:    Thankyou! I am due to refill my Avonex,  should I refill that for next week?

## 2018-03-01 NOTE — TELEPHONE ENCOUNTER
Dr. Mary major with patient stopping Avonex now, then starting Rebif (titration), with one week washout. Pt verbalized understanding of all the above.

## 2018-03-04 ENCOUNTER — PATIENT MESSAGE (OUTPATIENT)
Dept: NEUROLOGY | Facility: CLINIC | Age: 33
End: 2018-03-04

## 2018-03-05 ENCOUNTER — HOSPITAL ENCOUNTER (OUTPATIENT)
Dept: RADIOLOGY | Facility: HOSPITAL | Age: 33
Discharge: HOME | End: 2018-03-05
Attending: PSYCHIATRY & NEUROLOGY
Payer: COMMERCIAL

## 2018-03-05 ENCOUNTER — TELEPHONE (OUTPATIENT)
Dept: NEUROLOGY | Facility: CLINIC | Age: 33
End: 2018-03-05

## 2018-03-05 DIAGNOSIS — G35 MS (MULTIPLE SCLEROSIS): ICD-10-CM

## 2018-03-05 DIAGNOSIS — F41.9 ANXIETY: Primary | ICD-10-CM

## 2018-03-05 PROCEDURE — 25500020 PHARM REV CODE 255: Performed by: PSYCHIATRY & NEUROLOGY

## 2018-03-05 PROCEDURE — 70553 MRI BRAIN STEM W/O & W/DYE: CPT | Mod: TC

## 2018-03-05 PROCEDURE — 70553 MRI BRAIN STEM W/O & W/DYE: CPT | Mod: 26,,, | Performed by: RADIOLOGY

## 2018-03-05 PROCEDURE — A9585 GADOBUTROL INJECTION: HCPCS | Performed by: PSYCHIATRY & NEUROLOGY

## 2018-03-05 RX ORDER — GADOBUTROL 604.72 MG/ML
7 INJECTION INTRAVENOUS
Status: COMPLETED | OUTPATIENT
Start: 2018-03-05 | End: 2018-03-05

## 2018-03-05 RX ADMIN — GADOBUTROL 7 ML: 604.72 INJECTION INTRAVENOUS at 10:03

## 2018-03-06 RX ORDER — DIAZEPAM 5 MG/1
TABLET ORAL
Qty: 2 TABLET | Refills: 0 | Status: SHIPPED | OUTPATIENT
Start: 2018-03-06 | End: 2018-06-29

## 2018-03-07 ENCOUNTER — TELEPHONE (OUTPATIENT)
Dept: PHARMACY | Facility: CLINIC | Age: 33
End: 2018-03-07

## 2018-03-07 NOTE — TELEPHONE ENCOUNTER
DOCUMENTATION ONLY  FYI  Rebif does not require a prior authorization through the patient's insurance.    Copay: $1504.62    Patient Assistance IS required and is being researched  [facility]

## 2018-03-09 NOTE — TELEPHONE ENCOUNTER
FOR DOCUMENTATION ONLY:  Financial Assistance for Rebif approved from 03/09/2018 to until her insurance benefits change  [facility]  BIN: [id number]  PCN: [id number]  Id: [id number]  GRP: HERLINDA Linares is $0. Forwarded to the clinical pharmacist for consult and shipment.  CAROL

## 2018-03-12 ENCOUNTER — TELEPHONE (OUTPATIENT)
Dept: PHARMACY | Facility: CLINIC | Age: 33
End: 2018-03-12

## 2018-03-12 NOTE — TELEPHONE ENCOUNTER
pt declined [facility] consult as she has been on rebif before avonex. $0 copay- will ship 3/13 to arrive on 3/14- to work address    no questions or concerns at this time. Start date pending for Rebif Rebidose titration pens. Last Avonex injection was on 3/6 per patient.     Mirian Fair, Pharm.D  Specialty Pharmacy Clinical Pharmacist  [facility]  Phone: [phone]

## 2018-03-15 ENCOUNTER — OFFICE VISIT (OUTPATIENT)
Dept: PSYCHIATRY | Facility: CLINIC | Age: 33
End: 2018-03-15
Payer: COMMERCIAL

## 2018-03-15 DIAGNOSIS — F43.22 ADJUSTMENT DISORDER WITH ANXIETY: Primary | ICD-10-CM

## 2018-03-15 PROCEDURE — 90834 PSYTX W PT 45 MINUTES: CPT | Mod: S$GLB,,, | Performed by: SOCIAL WORKER

## 2018-03-15 NOTE — PROGRESS NOTES
"Individual Psychotherapy (PhD/LCSW)    3/15/2018    Site:  [facility]         Therapeutic Intervention: Met with patient.  Outpatient - Insight oriented psychotherapy 45 min - CPT code 31050 and Outpatient - Supportive psychotherapy 45 min - CPT Code 47393    Chief complaint/reason for encounter: anxiety     Interval history and content of current session: King arrived to session casually dressed and with good hygiene.  She did complete the ARIEL-7 Self Report for the past two weeks and scored 7/21.  She reports some intermittent anxiety around events that she cannot control, but overall is feeling better.  First, she stopped her Avonex and will resume Rebif on Monday.  She explained that her body feels better without the Avonex and the emotional experience of dosing herself has been eliminated.  Additionally, she has been using the concepts of the CBT thought records to challenge should statements and other unhelpful thoughts and has found this helpful.  Her  also offered a useful tool for considering some career-related decisions, which is to ask "does it make me happy".  Pt found this helpful because she does not need to make decisions based on financial needs or status, at this point in her career.  She also had some confrontations with people over the past few weeks and practiced expressing her feelings and thoughts directly and honestly.  She found this very helpful and felt she was able to resolve some problems.  Highlighted King's implementation of these skills and spent some time discussing and explaining relaxation techniques.  She will practice square breathing 2x/day for the next few weeks and continue to track her anxiety.    Treatment plan:  · Target symptoms: anxiety   · Why chosen therapy is appropriate versus another modality: relevant to diagnosis, patient responds to this modality, evidence based practice  · Outcome monitoring methods: self-report  · Therapeutic intervention type: " insight oriented psychotherapy, supportive psychotherapy    Risk parameters:  Patient reports no suicidal ideation  Patient reports no homicidal ideation  Patient reports no self-injurious behavior  Patient reports no violent behavior    Verbal deficits: None    Patient's response to intervention:  The patient's response to intervention is accepting, motivated.    Progress toward goals and other mental status changes:  The patient's progress toward goals is good.    Diagnosis:     ICD-10-CM ICD-9-CM   1. Adjustment disorder with anxiety F43.22 309.24        Plan:  individual psychotherapy and continue tracking anxiety during transition from Avonex to Rebif, utilize relaxation technique of square breathing 2x/day    Return to clinic: 3 weeks    Length of Service (minutes): 45

## 2018-03-26 ENCOUNTER — PATIENT MESSAGE (OUTPATIENT)
Dept: OBSTETRICS AND GYNECOLOGY | Facility: CLINIC | Age: 33
End: 2018-03-26

## 2018-03-26 DIAGNOSIS — Z30.9 ENCOUNTER FOR CONTRACEPTIVE MANAGEMENT, UNSPECIFIED TYPE: Primary | ICD-10-CM

## 2018-03-26 RX ORDER — DROSPIRENONE AND ETHINYL ESTRADIOL 0.02-3(28)
1 KIT ORAL DAILY
Qty: 84 TABLET | Refills: 3 | Status: SHIPPED | OUTPATIENT
Start: 2018-03-26 | End: 2018-08-14 | Stop reason: SDUPTHER

## 2018-04-02 ENCOUNTER — OFFICE VISIT (OUTPATIENT)
Dept: PSYCHIATRY | Facility: CLINIC | Age: 33
End: 2018-04-02
Payer: COMMERCIAL

## 2018-04-02 DIAGNOSIS — F43.22 ADJUSTMENT DISORDER WITH ANXIETY: Primary | ICD-10-CM

## 2018-04-02 PROCEDURE — 90834 PSYTX W PT 45 MINUTES: CPT | Mod: S$GLB,,, | Performed by: SOCIAL WORKER

## 2018-04-02 NOTE — PROGRESS NOTES
"Individual Psychotherapy (PhD/LCSW)    4/2/2018    Site:  [facility]         Therapeutic Intervention: Met with patient.  Outpatient - Insight oriented psychotherapy 45 min - CPT code 92365 and Outpatient - Supportive psychotherapy 45 min - CPT Code 18092    Chief complaint/reason for encounter: anxiety     Interval history and content of current session:   King arrived to session casually dressed and with good hygiene.  She reports significant improvement in mood and anxiety since last session.  Specifically, she describes decline in irritability, worry, and low mood.  She attributes some of this to the change in her medication (from Avonex to Rebif).  She also continues to challenge unhelpful thoughts and checks unhelpful thoughts with her  for grounding.  She's also used the square breathing technique everyday and finds it helpful for remaining more focused on the present.  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  King described her response to the individual, which seemed mature, direct, and firm but kind.  However, King explained that after the incident she vented to her co-workers and then felt guilty because "I acted just like the client - spreading negativity".  This left her feeling guilty.  Used this as an opportunity to help King explore her tendency toward excessive self-guilt.  We examined the facts, comparing the client's behavior with her own and pointing out her tendency to magnify her own "negative" responses.  Additionally, King has been accepted into a  training program, beginning September.  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  However, once she was accepted she described feeling guilty and uncertain about the opportunity.  She describes how these feelings/thoughts don't make sense as she's always achieved her goals and never " let fears interfere.  We will continue to explore unhelpful thoughts/beliefs and pt will continue to use her thought record in her journal.      Treatment plan:  · Target symptoms: anxiety   · Why chosen therapy is appropriate versus another modality: relevant to diagnosis, patient responds to this modality, evidence based practice  · Outcome monitoring methods: self-report  · Therapeutic intervention type: insight oriented psychotherapy, behavior modifying psychotherapy, supportive psychotherapy    Risk parameters:  Patient reports no suicidal ideation  Patient reports no homicidal ideation  Patient reports no self-injurious behavior  Patient reports no violent behavior    Verbal deficits: None    Patient's response to intervention:  The patient's response to intervention is accepting, motivated.    Progress toward goals and other mental status changes:  The patient's progress toward goals is good.    Diagnosis:     ICD-10-CM ICD-9-CM   1. Adjustment disorder with anxiety F43.22 309.24        Plan:  individual psychotherapy and continue use of thought record/journal, utilize relaxation technique of square breathing 2x/day    Return to clinic: 3 weeks    Length of Service (minutes): 45

## 2018-04-06 ENCOUNTER — TELEPHONE (OUTPATIENT)
Dept: PHARMACY | Facility: CLINIC | Age: 33
End: 2018-04-06

## 2018-04-24 ENCOUNTER — PATIENT MESSAGE (OUTPATIENT)
Dept: OBSTETRICS AND GYNECOLOGY | Facility: CLINIC | Age: 33
End: 2018-04-24

## 2018-04-30 ENCOUNTER — PATIENT MESSAGE (OUTPATIENT)
Dept: PSYCHIATRY | Facility: CLINIC | Age: 33
End: 2018-04-30

## 2018-06-12 ENCOUNTER — TELEPHONE (OUTPATIENT)
Dept: PHARMACY | Facility: CLINIC | Age: 33
End: 2018-06-12

## 2018-06-18 DIAGNOSIS — G35 MULTIPLE SCLEROSIS: ICD-10-CM

## 2018-06-27 ENCOUNTER — PATIENT MESSAGE (OUTPATIENT)
Dept: NEUROLOGY | Facility: CLINIC | Age: 33
End: 2018-06-27

## 2018-06-28 NOTE — TELEPHONE ENCOUNTER
Returned call. Pt has c/o left leg weakness, and mild tremors in her left arm. Her symptoms are not new, and come and go. She states she has been out in the sun more and working more. Pt scheduled to see Dr. Hernandez on 6/29/18 at 9AM. Time approved by Dr. Hernandez.

## 2018-06-29 ENCOUNTER — PATIENT MESSAGE (OUTPATIENT)
Dept: NEUROLOGY | Facility: CLINIC | Age: 33
End: 2018-06-29

## 2018-06-29 ENCOUNTER — LAB VISIT (OUTPATIENT)
Dept: LAB | Facility: HOSPITAL | Age: 33
End: 2018-06-29
Attending: PSYCHIATRY & NEUROLOGY
Payer: COMMERCIAL

## 2018-06-29 ENCOUNTER — OFFICE VISIT (OUTPATIENT)
Dept: NEUROLOGY | Facility: CLINIC | Age: 33
End: 2018-06-29
Payer: COMMERCIAL

## 2018-06-29 VITALS
DIASTOLIC BLOOD PRESSURE: 67 MMHG | SYSTOLIC BLOOD PRESSURE: 103 MMHG | HEIGHT: 65 IN | BODY MASS INDEX: 23.91 KG/M2 | WEIGHT: 143.5 LBS | HEART RATE: 67 BPM

## 2018-06-29 DIAGNOSIS — G35 MULTIPLE SCLEROSIS EXACERBATION: Primary | ICD-10-CM

## 2018-06-29 DIAGNOSIS — G35 MS (MULTIPLE SCLEROSIS): ICD-10-CM

## 2018-06-29 DIAGNOSIS — Z71.89 COUNSELING REGARDING GOALS OF CARE: ICD-10-CM

## 2018-06-29 DIAGNOSIS — G35 MULTIPLE SCLEROSIS EXACERBATION: ICD-10-CM

## 2018-06-29 DIAGNOSIS — G35 MS (MULTIPLE SCLEROSIS): Primary | ICD-10-CM

## 2018-06-29 DIAGNOSIS — Z29.89 PROPHYLACTIC IMMUNOTHERAPY: ICD-10-CM

## 2018-06-29 LAB
ALBUMIN SERPL BCP-MCNC: 3.5 G/DL
ALP SERPL-CCNC: 44 U/L
ALT SERPL W/O P-5'-P-CCNC: 25 U/L
AST SERPL-CCNC: 22 U/L
BASOPHILS # BLD AUTO: 0.02 K/UL
BASOPHILS NFR BLD: 0.5 %
BILIRUB DIRECT SERPL-MCNC: 0.2 MG/DL
BILIRUB SERPL-MCNC: 0.3 MG/DL
DIFFERENTIAL METHOD: ABNORMAL
EOSINOPHIL # BLD AUTO: 0 K/UL
EOSINOPHIL NFR BLD: 1 %
ERYTHROCYTE [DISTWIDTH] IN BLOOD BY AUTOMATED COUNT: 11.9 %
HCT VFR BLD AUTO: 34.3 %
HGB BLD-MCNC: 11.8 G/DL
IMM GRANULOCYTES # BLD AUTO: 0.01 K/UL
IMM GRANULOCYTES NFR BLD AUTO: 0.2 %
LYMPHOCYTES # BLD AUTO: 1.9 K/UL
LYMPHOCYTES NFR BLD: 46.7 %
MCH RBC QN AUTO: 34.2 PG
MCHC RBC AUTO-ENTMCNC: 34.4 G/DL
MCV RBC AUTO: 99 FL
MONOCYTES # BLD AUTO: 0.3 K/UL
MONOCYTES NFR BLD: 6.5 %
NEUTROPHILS # BLD AUTO: 1.8 K/UL
NEUTROPHILS NFR BLD: 45.1 %
NRBC BLD-RTO: 0 /100 WBC
PLATELET # BLD AUTO: 193 K/UL
PMV BLD AUTO: 9.7 FL
PROT SERPL-MCNC: 7 G/DL
RBC # BLD AUTO: 3.45 M/UL
WBC # BLD AUTO: 4.03 K/UL

## 2018-06-29 PROCEDURE — 86382 NEUTRALIZATION TEST VIRAL: CPT

## 2018-06-29 PROCEDURE — 80076 HEPATIC FUNCTION PANEL: CPT

## 2018-06-29 PROCEDURE — 3008F BODY MASS INDEX DOCD: CPT | Mod: CPTII,S$GLB,, | Performed by: PSYCHIATRY & NEUROLOGY

## 2018-06-29 PROCEDURE — 85025 COMPLETE CBC W/AUTO DIFF WBC: CPT

## 2018-06-29 PROCEDURE — 99214 OFFICE O/P EST MOD 30 MIN: CPT | Mod: S$GLB,,, | Performed by: PSYCHIATRY & NEUROLOGY

## 2018-06-29 PROCEDURE — 99999 PR PBB SHADOW E&M-EST. PATIENT-LVL II: CPT | Mod: PBBFAC,,, | Performed by: PSYCHIATRY & NEUROLOGY

## 2018-06-29 PROCEDURE — 36415 COLL VENOUS BLD VENIPUNCTURE: CPT

## 2018-06-29 RX ORDER — DEXAMETHASONE 6 MG/1
TABLET ORAL
Qty: 39 TABLET | Refills: 0 | Status: SHIPPED | OUTPATIENT
Start: 2018-06-29 | End: 2019-04-29 | Stop reason: ALTCHOICE

## 2018-06-29 RX ORDER — METHYLPREDNISOLONE SODIUM SUCCINATE 1 G/16ML
INJECTION INTRAMUSCULAR; INTRAVENOUS
Qty: 3000 MG | Refills: 0 | Status: SHIPPED | OUTPATIENT
Start: 2018-06-29 | End: 2019-04-29 | Stop reason: ALTCHOICE

## 2018-06-29 NOTE — TELEPHONE ENCOUNTER
Per Dr. Hernandez, if Solumedrol not covered, okay to send in Decadron. Pt prefers Decadron rx be sent to [facility]. Called in the following prescription to [facility], at [facility]:    dexamethasone (DECADRON) 6 MG tablet  Take 13 tablets (78mg) by mouth daily for 3 days.   Phone In, Disp-39 tablet, R-0

## 2018-06-29 NOTE — PROGRESS NOTES
Subjective:       Patient ID: King Perez is a 33 y.o. female who presents today for a urgent clinic visit for MS.      MS HPI:  · DMT: Rebif  · Side effects from DMT? Has been adherent; has missed 2 shots in the past 3 months; she does have some flu like sx, but okay if she hydrates;   · Taking vitamin D3 as recommended? Misses it a lot;    · For the past few weeks, she noticed increased symptoms of tingling on left leg, and some coordination issues with her left arm.  Over the past one week, the symptoms have become more intense and more persistent.  Also describes symptoms of worsening balance.     · She has been traveling more, and working a lot which is adding some stress.   · She feels her cognitive is slightly off, and her balance is slightly off.     SOCIAL HISTORY  Social History   Substance Use Topics    Smoking status: Never Smoker    Smokeless tobacco: Not on file    Alcohol use Not on file     Living arrangements - the patient lives with their spouse.  Employment : full time;       Objective:        25 foot timed walk: 4.4 without assist; some difficulty hopping on left foot today; right foot intact  Neurologic Exam    MENTAL STATUS: grossly intact  CRANIAL NERVE EXAM: There is no internuclear ophthalmoplegia. Extraocular   muscles are intact.  No facial   asymmetry.There is no dysarthria. Color vision 18/18 each eye; 20/20 vision OD and OS  MOTOR EXAM: Normal bulk and tone throughout UE and LE bilaterally. Rapid sequential movements are normal. Strength is 5/5 in all groups   in the lower extremities and upper extremities.   REFLEXES: Symmetric and 2+ throughout in all 4 extremities.   SENSORY EXAM: Normal to vibration t/o  COORDINATION: Normal finger-to-nose exam.   GAIT: Narrow based and stable.      Imaging:     Results for orders placed during the hospital encounter of 03/05/18   MRI Brain W WO Contrast    Impression Continued scattered punctate foci of T2 flair signal hyperintensity  supratentorial parenchyma allowing for differences in technique there is no significant new lesion.    No evidence for enhancing lesion or diffusion signal abnormality with configuration overall suggestive for mild degree of prior demyelinating plaque burden. Clinical correlation and continued followup advised       Electronically signed by: JEREMÍAS TRAMMELL DO  Date:     03/05/18  Time:    11:24      Results for orders placed during the hospital encounter of 11/07/16   MRI Cervical Spine W WO Cont    Impression  Scattered foci of high T2/STIR signal within the cervical and thoracic spinal cord appear grossly similar when compared to previous exam, allowing for some study limitations due to motion artifact.  Findings are consistent with provided history of multiple sclerosis.  No enhancement is seen to indicate active demyelination.      Electronically signed by: PALAK ROBERTS MD  Date:     11/07/16  Time:    13:32      Results for orders placed during the hospital encounter of 11/07/16   MRI Thoracic Spine W WO Contrast    Impression  See MRI Cervical Spine W WO Contrast study report done of same date for interpretation.      Electronically signed by: PALAK ROBERTS MD  Date:     01/03/17  Time:    11:40        Labs:     Lab Results   Component Value Date    QGRMNIPN59ME 51 07/03/2017    KJXMXRHE05KN 19 (L) 02/21/2015     No results found for: JCVINDEX, JCVANTIBODY  Lab Results   Component Value Date    UF9IHNQV 75.0 07/03/2017    ABSOLUTECD3 1736 07/03/2017    TL0NVVZX 22.8 07/03/2017    ABSOLUTECD8 528 07/03/2017    HT2GNVIH 52.5 07/03/2017    ABSOLUTECD4 1214 07/03/2017    LABCD48 2.30 07/03/2017     Lab Results   Component Value Date    WBC 5.42 01/15/2018    RBC 3.45 (L) 01/15/2018    HGB 11.7 (L) 01/15/2018    HCT 33.9 (L) 01/15/2018    MCV 98 01/15/2018    MCH 33.9 (H) 01/15/2018    MCHC 34.5 01/15/2018    RDW 11.7 01/15/2018     01/15/2018    MPV 10.7 01/15/2018    GRAN 2.7 01/15/2018    GRAN 49.1  01/15/2018    LYMPH 2.4 01/15/2018    LYMPH 44.6 01/15/2018    MONO 0.3 01/15/2018    MONO 5.0 01/15/2018    EOS 0.0 01/15/2018    BASO 0.02 01/15/2018    EOSINOPHIL 0.7 01/15/2018    BASOPHIL 0.4 01/15/2018     Sodium   Date Value Ref Range Status   02/20/2015 138 136 - 145 mmol/L Final     Potassium   Date Value Ref Range Status   02/20/2015 4.1 3.5 - 5.1 mmol/L Final     Chloride   Date Value Ref Range Status   02/20/2015 105 95 - 110 mmol/L Final     CO2   Date Value Ref Range Status   02/20/2015 20 (L) 23 - 29 mmol/L Final     Glucose   Date Value Ref Range Status   02/20/2015 78 70 - 110 mg/dL Final     BUN, Bld   Date Value Ref Range Status   02/20/2015 6 6 - 20 mg/dL Final     Creatinine   Date Value Ref Range Status   02/20/2015 0.7 0.5 - 1.4 mg/dL Final     Calcium   Date Value Ref Range Status   02/20/2015 9.8 8.7 - 10.5 mg/dL Final     Total Protein   Date Value Ref Range Status   01/15/2018 7.1 6.0 - 8.4 g/dL Final     Albumin   Date Value Ref Range Status   01/15/2018 3.6 3.5 - 5.2 g/dL Final     Total Bilirubin   Date Value Ref Range Status   01/15/2018 0.4 0.1 - 1.0 mg/dL Final     Comment:     For infants and newborns, interpretation of results should be based  on gestational age, weight and in agreement with clinical  observations.  Premature Infant recommended reference ranges:  Up to 24 hours.............<8.0 mg/dL  Up to 48 hours............<12.0 mg/dL  3-5 days..................<15.0 mg/dL  6-29 days.................<15.0 mg/dL       Alkaline Phosphatase   Date Value Ref Range Status   01/15/2018 37 (L) 55 - 135 U/L Final     AST   Date Value Ref Range Status   01/15/2018 18 10 - 40 U/L Final     ALT   Date Value Ref Range Status   01/15/2018 28 10 - 44 U/L Final     Anion Gap   Date Value Ref Range Status   02/20/2015 13 8 - 16 mmol/L Final     eGFR if    Date Value Ref Range Status   02/20/2015 >60 >60 mL/min/1.73 m^2 Final     eGFR if non    Date Value Ref  Range Status   02/20/2015 >60 >60 mL/min/1.73 m^2 Final     Comment:     Calculation used to obtain the estimated glomerular filtration  rate (eGFR) is the CKD-EPI equation. Since race is unknown   in our information system, the eGFR values for   -American and Non--American patients are given   for each creatinine result.           Diagnosis/Assessment/Plan:    1. Multiple Sclerosis  · Assessment: suspect mild exacerbation of MS (vs Uhthoff's related to heat/ work, etc); will treat with 3 day course of pulse solumedrol 1,000mg/day via oral smoothie; side effects discussed  · Imaging: discussed getting new MRI of brain and C spine, but have decided to defer for now; if sx persist, will pursue  · Disease Modifying Therapies: continue Rebif; check NABs today;     F/u 3 mo    Over 50% of this 30 minute visit was spent in direct face to face counseling of the patient about MS, DMT considerations, and MS symptom management.     MS (multiple sclerosis)  -     Nabferon Antibody; Future; Expected date: 06/29/2018    Multiple sclerosis exacerbation  -     methylPREDNISolone sodium succinate (SOLU-MEDROL) 1,000 mg injection; Take 1,000mg dissolved in a smoothie daily x 3 days.  Dispense: 3000 mg; Refill: 0

## 2018-07-02 ENCOUNTER — PATIENT MESSAGE (OUTPATIENT)
Dept: NEUROLOGY | Facility: CLINIC | Age: 33
End: 2018-07-02

## 2018-07-09 ENCOUNTER — PATIENT MESSAGE (OUTPATIENT)
Dept: NEUROLOGY | Facility: CLINIC | Age: 33
End: 2018-07-09

## 2018-07-09 DIAGNOSIS — B00.9 DISEASE OF GINGIVA DUE TO RECURRENT ORAL HERPES SIMPLEX VIRUS (HSV) INFECTION: ICD-10-CM

## 2018-07-09 DIAGNOSIS — B00.9 HERPES: Primary | ICD-10-CM

## 2018-07-09 DIAGNOSIS — K06.9 DISEASE OF GINGIVA DUE TO RECURRENT ORAL HERPES SIMPLEX VIRUS (HSV) INFECTION: ICD-10-CM

## 2018-07-09 RX ORDER — VALACYCLOVIR HYDROCHLORIDE 1 G/1
1000 TABLET, FILM COATED ORAL 2 TIMES DAILY PRN
Qty: 30 TABLET | Refills: 5 | Status: SHIPPED | OUTPATIENT
Start: 2018-07-09 | End: 2018-11-27 | Stop reason: SDUPTHER

## 2018-07-16 ENCOUNTER — TELEPHONE (OUTPATIENT)
Dept: PHARMACY | Facility: CLINIC | Age: 33
End: 2018-07-16

## 2018-07-16 DIAGNOSIS — G35 MULTIPLE SCLEROSIS: ICD-10-CM

## 2018-07-16 LAB — NABFERON ANTIBODY TEST: NORMAL

## 2018-07-17 ENCOUNTER — TELEPHONE (OUTPATIENT)
Dept: NEUROLOGY | Facility: CLINIC | Age: 33
End: 2018-07-17

## 2018-07-17 NOTE — TELEPHONE ENCOUNTER
----- Message from Bernard Monteiro sent at 7/17/2018  9:35 AM CDT -----  Pharmacy Calling    Reason for call: Calling to confirm status of refill request  Pharmacy Name: [facility] w/ [facility]  Prescription Name: interferon beta-1a, albumin, (REBIF REBIDOSE) 44 mcg/0.5 mL PnIj  Phone Number: [phone]  Additional Information:

## 2018-08-06 NOTE — TELEPHONE ENCOUNTER
[facility] has been attempting to reach Ms. Perez regarding prescription refill for Rebif.     After numerous unsuccessful attempts and a portal message, we are sending a postcard to the address on file. At this point in time, no further contact will be made from [facility] until patient responds to our mail correspondence.    If there is anything else we can do to further assist, please let us know.    inBasket sent.     Thank you,  Mahogany Parekh, PharmD  Specialty Pharmacy Clinical Pharmacist  [facility]  P: [phone]

## 2018-08-14 DIAGNOSIS — Z30.9 ENCOUNTER FOR CONTRACEPTIVE MANAGEMENT, UNSPECIFIED TYPE: ICD-10-CM

## 2018-08-14 RX ORDER — DROSPIRENONE AND ETHINYL ESTRADIOL 0.02-3(28)
1 KIT ORAL DAILY
Qty: 84 TABLET | Refills: 3 | Status: SHIPPED | OUTPATIENT
Start: 2018-08-14 | End: 2018-09-11 | Stop reason: CLARIF

## 2018-09-04 ENCOUNTER — TELEPHONE (OUTPATIENT)
Dept: PHARMACY | Facility: CLINIC | Age: 33
End: 2018-09-04

## 2018-09-10 ENCOUNTER — PATIENT MESSAGE (OUTPATIENT)
Dept: OBSTETRICS AND GYNECOLOGY | Facility: CLINIC | Age: 33
End: 2018-09-10

## 2018-09-11 ENCOUNTER — PATIENT MESSAGE (OUTPATIENT)
Dept: OBSTETRICS AND GYNECOLOGY | Facility: CLINIC | Age: 33
End: 2018-09-11

## 2018-09-11 RX ORDER — DESOGESTREL AND ETHINYL ESTRADIOL 21-5 (28)
1 KIT ORAL DAILY
Qty: 30 TABLET | Refills: 5 | Status: SHIPPED | OUTPATIENT
Start: 2018-09-11 | End: 2019-03-12

## 2018-09-17 ENCOUNTER — TELEPHONE (OUTPATIENT)
Dept: PHARMACY | Facility: CLINIC | Age: 33
End: 2018-09-17

## 2018-10-08 ENCOUNTER — TELEPHONE (OUTPATIENT)
Dept: PHARMACY | Facility: CLINIC | Age: 33
End: 2018-10-08

## 2018-11-06 ENCOUNTER — OFFICE VISIT (OUTPATIENT)
Dept: NEUROLOGY | Facility: CLINIC | Age: 33
End: 2018-11-06
Payer: COMMERCIAL

## 2018-11-06 VITALS
HEIGHT: 65 IN | HEART RATE: 68 BPM | WEIGHT: 151.44 LBS | BODY MASS INDEX: 25.23 KG/M2 | DIASTOLIC BLOOD PRESSURE: 64 MMHG | SYSTOLIC BLOOD PRESSURE: 102 MMHG

## 2018-11-06 DIAGNOSIS — E55.9 VITAMIN D DEFICIENCY: ICD-10-CM

## 2018-11-06 DIAGNOSIS — G35 MS (MULTIPLE SCLEROSIS): Primary | ICD-10-CM

## 2018-11-06 PROCEDURE — 99999 PR PBB SHADOW E&M-EST. PATIENT-LVL III: CPT | Mod: PBBFAC,,, | Performed by: PSYCHIATRY & NEUROLOGY

## 2018-11-06 PROCEDURE — 3008F BODY MASS INDEX DOCD: CPT | Mod: CPTII,S$GLB,, | Performed by: PSYCHIATRY & NEUROLOGY

## 2018-11-06 PROCEDURE — 99215 OFFICE O/P EST HI 40 MIN: CPT | Mod: S$GLB,,, | Performed by: PSYCHIATRY & NEUROLOGY

## 2018-11-06 RX ORDER — DIAZEPAM 5 MG/1
TABLET ORAL
Qty: 2 TABLET | Refills: 0 | Status: SHIPPED | OUTPATIENT
Start: 2018-11-06 | End: 2019-03-12

## 2018-11-06 NOTE — PROGRESS NOTES
"Subjective:       Patient ID: King Perez is a 33 y.o. female who presents today for a routine f/u for MS; Pt is accompanied by her     MS HPI:  · DMT: Rebif  · Side effects from DMT? Some induration at IS  · Taking vitamin D3 as recommended? 800IU/day;   · 3 days of decadron was helpful with sx she experienced in July; did have steroid side effects (felt hyper);   · Feeling back to baseline now;   · This was first relapse ever since starting Rebif 3 years ago;   · She reports only minimal flu-like sx on Rebif;   · She states she is thinking of becoming pregnant next year, and would like to discuss this further today    SOCIAL HISTORY  Social History     Tobacco Use    Smoking status: Never Smoker   Substance Use Topics    Alcohol use: Not on file    Drug use: Not on file     Living arrangements - the patient lives with their spouse.  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 No  · Copay Assist?  Yes - $0  · Clinical Trial candidate? No    Objective:        25 foot timed walk: 3.98 without assist; was 4.4s last visit; able to hop 10x each foot (improved from prior) ;  Neurologic Exam    MENTAL STATUS: grossly intact  CRANIAL NERVE EXAM: There is no internuclear ophthalmoplegia. Extraocular   muscles are intact.  No facial   asymmetry.There is no " dysarthria. Color vision 18/18 each eye; 20/20 vision OD and OS  MOTOR EXAM: Normal bulk and tone throughout UE and LE bilaterally. Rapid sequential movements are normal. Strength is 5/5 in all groups   in the lower extremities and upper extremities.   REFLEXES: Symmetric and 2+ throughout in all 4 extremities.   SENSORY EXAM: Normal to vibration t/o  COORDINATION: Normal finger-to-nose exam.   GAIT: Narrow based and stable.      Imaging:     Results for orders placed during the hospital encounter of 03/05/18   MRI Brain W WO Contrast    Impression Continued scattered punctate foci of T2 flair signal hyperintensity supratentorial parenchyma allowing for differences in technique there is no significant new lesion.    No evidence for enhancing lesion or diffusion signal abnormality with configuration overall suggestive for mild degree of prior demyelinating plaque burden. Clinical correlation and continued followup advised       Electronically signed by: JEREMÍAS TRAMMELL DO  Date:     03/05/18  Time:    11:24      Results for orders placed during the hospital encounter of 11/07/16   MRI Cervical Spine W WO Cont    Impression  Scattered foci of high T2/STIR signal within the cervical and thoracic spinal cord appear grossly similar when compared to previous exam, allowing for some study limitations due to motion artifact.  Findings are consistent with provided history of multiple sclerosis.  No enhancement is seen to indicate active demyelination.      Electronically signed by: PALAK ROBERTS MD  Date:     11/07/16  Time:    13:32      Results for orders placed during the hospital encounter of 11/07/16   MRI Thoracic Spine W WO Contrast    Impression  See MRI Cervical Spine W WO Contrast study report done of same date for interpretation.      Electronically signed by: PALAK ROBERTS MD  Date:     01/03/17  Time:    11:40        Labs:     Lab Results   Component Value Date    WFJUQJKJ68GA 50 11/12/2018    VESVWXOE88TM 51  07/03/2017    MKXCUYMX96HT 19 (L) 02/21/2015     No results found for: JCVINDEX, JCVANTIBODY  Lab Results   Component Value Date    CZ2LGXLR 75.0 07/03/2017    ABSOLUTECD3 1736 07/03/2017    TK9TBKTS 22.8 07/03/2017    ABSOLUTECD8 528 07/03/2017    BO3FRNYW 52.5 07/03/2017    ABSOLUTECD4 1214 07/03/2017    LABCD48 2.30 07/03/2017     Lab Results   Component Value Date    WBC 5.70 11/12/2018    RBC 3.40 (L) 11/12/2018    HGB 11.5 (L) 11/12/2018    HCT 33.9 (L) 11/12/2018     (H) 11/12/2018    MCH 33.8 (H) 11/12/2018    MCHC 33.9 11/12/2018    RDW 11.7 11/12/2018    PLT 98 (L) 11/12/2018    MPV 11.2 11/12/2018    GRAN 3.3 11/12/2018    GRAN 57.1 11/12/2018    LYMPH 2.0 11/12/2018    LYMPH 35.3 11/12/2018    MONO 0.3 11/12/2018    MONO 5.6 11/12/2018    EOS 0.1 11/12/2018    BASO 0.02 11/12/2018    EOSINOPHIL 1.2 11/12/2018    BASOPHIL 0.4 11/12/2018     Sodium   Date Value Ref Range Status   02/20/2015 138 136 - 145 mmol/L Final     Potassium   Date Value Ref Range Status   02/20/2015 4.1 3.5 - 5.1 mmol/L Final     Chloride   Date Value Ref Range Status   02/20/2015 105 95 - 110 mmol/L Final     CO2   Date Value Ref Range Status   02/20/2015 20 (L) 23 - 29 mmol/L Final     Glucose   Date Value Ref Range Status   02/20/2015 78 70 - 110 mg/dL Final     BUN, Bld   Date Value Ref Range Status   02/20/2015 6 6 - 20 mg/dL Final     Creatinine   Date Value Ref Range Status   02/20/2015 0.7 0.5 - 1.4 mg/dL Final     Calcium   Date Value Ref Range Status   02/20/2015 9.8 8.7 - 10.5 mg/dL Final     Total Protein   Date Value Ref Range Status   11/12/2018 6.7 6.0 - 8.4 g/dL Final     Albumin   Date Value Ref Range Status   11/12/2018 3.4 (L) 3.5 - 5.2 g/dL Final     Total Bilirubin   Date Value Ref Range Status   11/12/2018 0.2 0.1 - 1.0 mg/dL Final     Comment:     For infants and newborns, interpretation of results should be based  on gestational age, weight and in agreement with clinical  observations.  Premature  Infant recommended reference ranges:  Up to 24 hours.............<8.0 mg/dL  Up to 48 hours............<12.0 mg/dL  3-5 days..................<15.0 mg/dL  6-29 days.................<15.0 mg/dL       Alkaline Phosphatase   Date Value Ref Range Status   11/12/2018 34 (L) 55 - 135 U/L Final     AST   Date Value Ref Range Status   11/12/2018 21 10 - 40 U/L Final     ALT   Date Value Ref Range Status   11/12/2018 27 10 - 44 U/L Final     Anion Gap   Date Value Ref Range Status   02/20/2015 13 8 - 16 mmol/L Final     eGFR if    Date Value Ref Range Status   02/20/2015 >60 >60 mL/min/1.73 m^2 Final     eGFR if non    Date Value Ref Range Status   02/20/2015 >60 >60 mL/min/1.73 m^2 Final     Comment:     Calculation used to obtain the estimated glomerular filtration  rate (eGFR) is the CKD-EPI equation. Since race is unknown   in our information system, the eGFR values for   -American and Non--American patients are given   for each creatinine result.           Diagnosis/Assessment/Plan:    1. Multiple Sclerosis  · Assessment: Pt is clinically stable;  My suspicion is that she may have had a true relapse in late June given exam findings. NAB negative  · Imaging: will get new MRI of brain,  C and T spine to explore whether she formed new lesion  · Disease Modifying Therapies: continue Rebif; check labs as per protocol; continue high dose D3.         2. MS Symptom Assessment and Management: No other changes to regimen described in ROS above    3. Pregnancy:  We discussed pregnancy in MS at length.  If MRI does show new lesion, I'd favor that she continue Rebif while she tries to conceive, otherwise I think its okay to stop Rebif while trying to conceive.  Would advise she d/c Rebif once she becomes pregnant.  We also discussed lower risk of relapse during pregnancy, and higher risk of relapse after delivery      F/u [facility] in December    Over 50% of this 40 minute visit  was spent in direct face to face counseling of the patient about MS, DMT considerations, and MS symptom management.     Problem List Items Addressed This Visit     None      Visit Diagnoses     MS (multiple sclerosis)    -  Primary    Relevant Medications    diazePAM (VALIUM) 5 MG tablet    Other Relevant Orders    CBC auto differential (Completed)    Hepatic function panel (Completed)    Vitamin D (Completed)    MRI Brain Demyelinating W W/O Contrast    MRI Cervical Spine Demyelinating W W/O Contrast    MRI Thoracic Spine Demyelinating W W/O Contrast    Vitamin D deficiency        Relevant Orders    Vitamin D (Completed)

## 2018-11-07 ENCOUNTER — TELEPHONE (OUTPATIENT)
Dept: PHARMACY | Facility: CLINIC | Age: 33
End: 2018-11-07

## 2018-11-12 ENCOUNTER — LAB VISIT (OUTPATIENT)
Dept: LAB | Facility: HOSPITAL | Age: 33
End: 2018-11-12
Attending: PSYCHIATRY & NEUROLOGY
Payer: COMMERCIAL

## 2018-11-12 DIAGNOSIS — E55.9 VITAMIN D DEFICIENCY: ICD-10-CM

## 2018-11-12 DIAGNOSIS — G35 MS (MULTIPLE SCLEROSIS): ICD-10-CM

## 2018-11-12 LAB
25(OH)D3+25(OH)D2 SERPL-MCNC: 50 NG/ML
ALBUMIN SERPL BCP-MCNC: 3.4 G/DL
ALP SERPL-CCNC: 34 U/L
ALT SERPL W/O P-5'-P-CCNC: 27 U/L
AST SERPL-CCNC: 21 U/L
BASOPHILS # BLD AUTO: 0.02 K/UL
BASOPHILS NFR BLD: 0.4 %
BILIRUB DIRECT SERPL-MCNC: 0.1 MG/DL
BILIRUB SERPL-MCNC: 0.2 MG/DL
DIFFERENTIAL METHOD: ABNORMAL
EOSINOPHIL # BLD AUTO: 0.1 K/UL
EOSINOPHIL NFR BLD: 1.2 %
ERYTHROCYTE [DISTWIDTH] IN BLOOD BY AUTOMATED COUNT: 11.7 %
HCT VFR BLD AUTO: 33.9 %
HGB BLD-MCNC: 11.5 G/DL
IMM GRANULOCYTES # BLD AUTO: 0.02 K/UL
IMM GRANULOCYTES NFR BLD AUTO: 0.4 %
LYMPHOCYTES # BLD AUTO: 2 K/UL
LYMPHOCYTES NFR BLD: 35.3 %
MCH RBC QN AUTO: 33.8 PG
MCHC RBC AUTO-ENTMCNC: 33.9 G/DL
MCV RBC AUTO: 100 FL
MONOCYTES # BLD AUTO: 0.3 K/UL
MONOCYTES NFR BLD: 5.6 %
NEUTROPHILS # BLD AUTO: 3.3 K/UL
NEUTROPHILS NFR BLD: 57.1 %
NRBC BLD-RTO: 0 /100 WBC
PLATELET # BLD AUTO: 98 K/UL
PMV BLD AUTO: 11.2 FL
PROT SERPL-MCNC: 6.7 G/DL
RBC # BLD AUTO: 3.4 M/UL
WBC # BLD AUTO: 5.7 K/UL

## 2018-11-12 PROCEDURE — 36415 COLL VENOUS BLD VENIPUNCTURE: CPT | Mod: PO

## 2018-11-12 PROCEDURE — 80076 HEPATIC FUNCTION PANEL: CPT

## 2018-11-12 PROCEDURE — 85025 COMPLETE CBC W/AUTO DIFF WBC: CPT

## 2018-11-12 PROCEDURE — 82306 VITAMIN D 25 HYDROXY: CPT

## 2018-11-13 ENCOUNTER — PATIENT MESSAGE (OUTPATIENT)
Dept: NEUROLOGY | Facility: CLINIC | Age: 33
End: 2018-11-13

## 2018-11-13 ENCOUNTER — TELEPHONE (OUTPATIENT)
Dept: PHARMACY | Facility: CLINIC | Age: 33
End: 2018-11-13

## 2018-11-13 DIAGNOSIS — R71.8 ELEVATED MCV: ICD-10-CM

## 2018-11-13 DIAGNOSIS — D64.9 ANEMIA, UNSPECIFIED TYPE: Primary | ICD-10-CM

## 2018-11-16 ENCOUNTER — PATIENT MESSAGE (OUTPATIENT)
Dept: NEUROLOGY | Facility: CLINIC | Age: 33
End: 2018-11-16

## 2018-11-21 ENCOUNTER — TELEPHONE (OUTPATIENT)
Dept: PHARMACY | Facility: CLINIC | Age: 33
End: 2018-11-21

## 2018-11-26 ENCOUNTER — HOSPITAL ENCOUNTER (OUTPATIENT)
Dept: RADIOLOGY | Facility: HOSPITAL | Age: 33
Discharge: HOME | End: 2018-11-26
Attending: PSYCHIATRY & NEUROLOGY
Payer: COMMERCIAL

## 2018-11-26 DIAGNOSIS — G35 MS (MULTIPLE SCLEROSIS): ICD-10-CM

## 2018-11-26 PROCEDURE — 72157 MRI CHEST SPINE W/O & W/DYE: CPT | Mod: TC

## 2018-11-26 PROCEDURE — A9585 GADOBUTROL INJECTION: HCPCS | Performed by: PSYCHIATRY & NEUROLOGY

## 2018-11-26 PROCEDURE — 72156 MRI NECK SPINE W/O & W/DYE: CPT | Mod: TC

## 2018-11-26 PROCEDURE — 72157 MRI CHEST SPINE W/O & W/DYE: CPT | Mod: 26,,, | Performed by: RADIOLOGY

## 2018-11-26 PROCEDURE — 70553 MRI BRAIN STEM W/O & W/DYE: CPT | Mod: 26,,, | Performed by: RADIOLOGY

## 2018-11-26 PROCEDURE — 25500020 PHARM REV CODE 255: Performed by: PSYCHIATRY & NEUROLOGY

## 2018-11-26 PROCEDURE — 72156 MRI NECK SPINE W/O & W/DYE: CPT | Mod: 26,,, | Performed by: RADIOLOGY

## 2018-11-26 PROCEDURE — 70553 MRI BRAIN STEM W/O & W/DYE: CPT | Mod: TC

## 2018-11-26 RX ORDER — GADOBUTROL 604.72 MG/ML
7 INJECTION INTRAVENOUS
Status: COMPLETED | OUTPATIENT
Start: 2018-11-26 | End: 2018-11-26

## 2018-11-26 RX ADMIN — GADOBUTROL 7 ML: 604.72 INJECTION INTRAVENOUS at 04:11

## 2018-11-27 ENCOUNTER — PATIENT MESSAGE (OUTPATIENT)
Dept: OBSTETRICS AND GYNECOLOGY | Facility: CLINIC | Age: 33
End: 2018-11-27

## 2018-11-27 DIAGNOSIS — B00.9 DISEASE OF GINGIVA DUE TO RECURRENT ORAL HERPES SIMPLEX VIRUS (HSV) INFECTION: ICD-10-CM

## 2018-11-27 DIAGNOSIS — K06.9 DISEASE OF GINGIVA DUE TO RECURRENT ORAL HERPES SIMPLEX VIRUS (HSV) INFECTION: ICD-10-CM

## 2018-11-28 ENCOUNTER — PATIENT MESSAGE (OUTPATIENT)
Dept: NEUROLOGY | Facility: CLINIC | Age: 33
End: 2018-11-28

## 2018-11-30 RX ORDER — VALACYCLOVIR HYDROCHLORIDE 1 G/1
1000 TABLET, FILM COATED ORAL 2 TIMES DAILY PRN
Qty: 30 TABLET | Refills: 5 | Status: SHIPPED | OUTPATIENT
Start: 2018-11-30 | End: 2019-06-04 | Stop reason: SDUPTHER

## 2018-12-03 ENCOUNTER — PATIENT MESSAGE (OUTPATIENT)
Dept: NEUROLOGY | Facility: CLINIC | Age: 33
End: 2018-12-03

## 2018-12-03 ENCOUNTER — OFFICE VISIT (OUTPATIENT)
Dept: NEUROLOGY | Facility: CLINIC | Age: 33
End: 2018-12-03
Payer: COMMERCIAL

## 2018-12-03 VITALS
WEIGHT: 148.5 LBS | HEART RATE: 80 BPM | HEIGHT: 66 IN | BODY MASS INDEX: 23.87 KG/M2 | DIASTOLIC BLOOD PRESSURE: 69 MMHG | SYSTOLIC BLOOD PRESSURE: 110 MMHG

## 2018-12-03 DIAGNOSIS — G35 MULTIPLE SCLEROSIS: Primary | ICD-10-CM

## 2018-12-03 DIAGNOSIS — Z71.89 COUNSELING REGARDING GOALS OF CARE: ICD-10-CM

## 2018-12-03 DIAGNOSIS — Z29.89 PROPHYLACTIC IMMUNOTHERAPY: ICD-10-CM

## 2018-12-03 PROCEDURE — 99214 OFFICE O/P EST MOD 30 MIN: CPT | Mod: S$GLB,,, | Performed by: CLINICAL NURSE SPECIALIST

## 2018-12-03 PROCEDURE — 3008F BODY MASS INDEX DOCD: CPT | Mod: CPTII,S$GLB,, | Performed by: CLINICAL NURSE SPECIALIST

## 2018-12-03 PROCEDURE — 99999 PR PBB SHADOW E&M-EST. PATIENT-LVL III: CPT | Mod: PBBFAC,,, | Performed by: CLINICAL NURSE SPECIALIST

## 2018-12-03 NOTE — PROGRESS NOTES
"Subjective:       Patient ID: King Perez is a 33 y.o. female who presents today for a routine clinic visit for MS.  The history has been provided by the patient. She was last seen by Dr. Hernandez in November 2018.     MS HPI:  · DMT: Rebif 44mcg three times a week   · Side effects from DMT? Yes - Some scar tissue at the injection sites  · Taking vitamin D3 as recommended? Yes -  Dose: 1000 units daily   · She denies any new or different symptoms over the past month.   · She runs and does yoga for exercise.     SOCIAL HISTORY  Social History     Tobacco Use    Smoking status: Never Smoker   Substance Use Topics    Alcohol use: Not on file    Drug use: Not on file     Living arrangements - the patient lives with her   Employment: full time salon owner    MS ROS:  · Fatigue: No  · Sleep Disturbance: No; sleeps well most nights  · Bladder Dysfunction: No  · Bowel Dysfunction: No  · Spasticity: No  · Visual Symptoms: No  · Cognitive: "Most of the time it is good."   · Mood Disorder: No; denies depression; admits to mild anxiety over the past month   · Gait Disturbance: No  · Falls: No  · Hand Dysfunction: No  · Pain: No  · Sexual Dysfunction: No  · Skin Breakdown: No  · Tremors: No  · Dysphagia:  Very rare trouble swallowing   · Dysarthria:  She reports that it sometimes takes effort to get the words out   · Heat sensitivity:  Yes - If she gets overheated or too cold, she gets numb and tingly and tired.   · Any un-met adaptive needs? No  · Copay Assist?  Yes - $0      Objective:        1. 25 foot timed walk: 3.65 seconds today without assist; was 3.98 seconds at last visit without assist   Neurologic Exam      Remainder of neuro exam deferred today.         Imaging:     Narrative     EXAMINATION:  MRI BRAIN DEMYELINATING W/ WO CONTRAST    CLINICAL HISTORY:  MS;Multiple sclerosis    TECHNIQUE:  Sagittal 3D space FLAIR which was reformatted in the coronal and sagittal planes.  Axial T2, axial gradient, " axial T1 and axial diffusion imaging of the whole brain without contrast.  Following contrast administration axial T1 and sagittal T1 spoiled gradient which was reformatted in the coronal and axial planes were performed.  Seven ML Gadavist intravenous contrast.    COMPARISON:  03/05/2018    FINDINGS:  Several scattered  small-sized foci of T2 FLAIR signal hyperintensity within the supratentorial parenchyma without corresponding diffusion or enhancement.  Relatively stable from prior.  No definite new lesion or enhancing lesion configuration remains concerning for prior demyelination with punctate lesion within the mid undersurface of the body of the corpus callosum as well as extension of few foci into the margin of the posterior body and splenium of the corpus callosum.    There is no restricted diffusion to suggest acute infarction.  No abnormal parenchymal susceptibility to suggest parenchymal hemorrhage.  Major intracranial T2 flow voids are present.  The ventricles are stable without hydrocephalus.  There is no abnormal parenchymal susceptibility to suggest parenchymal hemorrhage.  There is no significant T1 signal hypointensity associated with T2 FLAIR lesions.  Slight limitation of the orbit secondary to artifact from presumed cosmetic makeup.      Impression       No significant change from prior.  Continued few scattered small sized T2 FLAIR signal hyperintensity supratentorial parenchyma remains concerning for mild degree of prior demyelinating plaque burden.    No definite new lesion or enhancing lesion to suggest significant interval or active demyelination.  Clinical correlation and continued follow-up advised.      Electronically signed by: Dennis Palmer DO  Date: 11/26/2018  Time: 16:27        Narrative     EXAMINATION:  MRI CERVICAL SPINE DEMYELINATING W W/O CONTRAST; MRI THORACIC SPINE DEMYELINATING W W/O CONTRAST    CLINICAL HISTORY:  MS;; ms;.  Multiple sclerosis    TECHNIQUE:  Sagittal T1, T2 and  STIR and axial T1 and T2 imaging of the cervical and thoracic spine without contrast.  In addition axial T1 and sagittal fat sat T1 postcontrast imaging of the cervical and thoracic spine were performed following 7 mL intravenous infusion of Gadavist contrast.    COMPARISON:  11/07/2016    FINDINGS:  MRI cervical spine:    The cervical sagittal alignment is similar prior.  The cervical vertebral body heights and contours are relatively stable.  Craniocervical junction is within normal limits.  Cervical spinal cord is stable in contour.  There is continue though slightly more apparent short segment foci of T2 stir signal hyperintensity in the cervical spinal cord specifically in the left aspect of the cervical spinal cord at C4 and central aspect of the cord at C6/C7 and punctate focus at the left aspect of the cord at C5/C6.    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  There is no significant disc bulge, central canal or neural foraminal stenosis throughout the cervical canal.    There is no abnormal intrathecal enhancement.    Allowing for differences in scanner sensitivity there is no definite new cord signal abnormality.    Thoracic spine: Thoracic sagittal alignment similar to prior thoracic cerebral body heights and contours relatively stable.    There is continued short segment focus of T2 stir signal hyperintensity in the thoracic cord at the T10 level with additional focus in the ventral aspect of the cord at the T7/T8 level no corresponding enhancement.  There is no new cord signal abnormality.  Tip of the conus approximates the T12/L1 disc level.    No significant disc bulge, central canal or neural foraminal stenosis throughout the thoracic canal.      Impression       Continued multiple short segment foci of T2  stir signal hyperintensities within the cervical and thoracic cord in light of history concerning for prior areas of demyelination.    No evidence for definite new cord lesion or abnormal intrathecal enhancement to suggest significant interval or active demyelination.    Please see above for additional details      Electronically signed by: Dennis Palmer DO  Date: 11/26/2018  Time: 16:23     Images reviewed with patient.   Labs:     Lab Results   Component Value Date    IOQDVUYL10QH 50 11/12/2018    DWPOMPRN14FY 51 07/03/2017    DFAUYVQO02VF 19 (L) 02/21/2015     Lab Results   Component Value Date    WBC 5.70 11/12/2018    RBC 3.40 (L) 11/12/2018    HGB 11.5 (L) 11/12/2018    HCT 33.9 (L) 11/12/2018     (H) 11/12/2018    MCH 33.8 (H) 11/12/2018    MCHC 33.9 11/12/2018    RDW 11.7 11/12/2018    PLT 98 (L) 11/12/2018    MPV 11.2 11/12/2018    GRAN 3.3 11/12/2018    GRAN 57.1 11/12/2018    LYMPH 2.0 11/12/2018    LYMPH 35.3 11/12/2018    MONO 0.3 11/12/2018    MONO 5.6 11/12/2018    EOS 0.1 11/12/2018    BASO 0.02 11/12/2018    EOSINOPHIL 1.2 11/12/2018    BASOPHIL 0.4 11/12/2018     Sodium   Date Value Ref Range Status   02/20/2015 138 136 - 145 mmol/L Final     Potassium   Date Value Ref Range Status   02/20/2015 4.1 3.5 - 5.1 mmol/L Final     Chloride   Date Value Ref Range Status   02/20/2015 105 95 - 110 mmol/L Final     CO2   Date Value Ref Range Status   02/20/2015 20 (L) 23 - 29 mmol/L Final     Glucose   Date Value Ref Range Status   02/20/2015 78 70 - 110 mg/dL Final     BUN, Bld   Date Value Ref Range Status   02/20/2015 6 6 - 20 mg/dL Final     Creatinine   Date Value Ref Range Status   02/20/2015 0.7 0.5 - 1.4 mg/dL Final     Calcium   Date Value Ref Range Status   02/20/2015 9.8 8.7 - 10.5 mg/dL Final     Total Protein   Date Value Ref Range Status   11/12/2018 6.7 6.0 - 8.4 g/dL Final     Albumin   Date Value Ref Range Status   11/12/2018 3.4 (L) 3.5 - 5.2 g/dL Final     Total Bilirubin   Date  Value Ref Range Status   11/12/2018 0.2 0.1 - 1.0 mg/dL Final     Comment:     For infants and newborns, interpretation of results should be based  on gestational age, weight and in agreement with clinical  observations.  Premature Infant recommended reference ranges:  Up to 24 hours.............<8.0 mg/dL  Up to 48 hours............<12.0 mg/dL  3-5 days..................<15.0 mg/dL  6-29 days.................<15.0 mg/dL       Alkaline Phosphatase   Date Value Ref Range Status   11/12/2018 34 (L) 55 - 135 U/L Final     AST   Date Value Ref Range Status   11/12/2018 21 10 - 40 U/L Final     ALT   Date Value Ref Range Status   11/12/2018 27 10 - 44 U/L Final     Anion Gap   Date Value Ref Range Status   02/20/2015 13 8 - 16 mmol/L Final     eGFR if    Date Value Ref Range Status   02/20/2015 >60 >60 mL/min/1.73 m^2 Final     eGFR if non    Date Value Ref Range Status   02/20/2015 >60 >60 mL/min/1.73 m^2 Final     Comment:     Calculation used to obtain the estimated glomerular filtration  rate (eGFR) is the CKD-EPI equation. Since race is unknown   in our information system, the eGFR values for   -American and Non--American patients are given   for each creatinine result.         Diagnosis/Assessment/Plan:    1. Multiple Sclerosis  · Assessment: King is radiologically stable on Rebif. She plans to stop taking birth control in January and start trying to conceive. She states that she has had not had a period since she was diagnosed with MS. Since she is clinically stable and MRIs showed no new lesions. I think it is reasonable for her to stop Rebif while she is trying to conceive. She will plan to stop Rebif and birth control at the same time. I advised her to let us know if she develops any new symptoms while off of DMT. We discussed lower risk of relapse during pregnancy and higher risk of relapse after delivery/   · Imaging: MRI brain in a year, unless she is pregnant  (November 2019)  · Disease Modifying Therapies: Continue Rebif until she starts trying to conceive. Continue Vitamin D.     2. MS Symptom Assessment / Management  · No change to symptom management plant today.     Over 50% of this 25 minute visit was spent in direct face to face counseling of the patient about MS, DMT considerations re: trying to conceive and pregnancy. The patient agrees with the plan of care. I will see her back in 4 months.    Margie Fierro, AGCNS-BC, MSCN         There are no diagnoses linked to this encounter.

## 2018-12-04 ENCOUNTER — PATIENT MESSAGE (OUTPATIENT)
Dept: NEUROLOGY | Facility: CLINIC | Age: 33
End: 2018-12-04

## 2018-12-06 ENCOUNTER — LAB VISIT (OUTPATIENT)
Dept: LAB | Facility: HOSPITAL | Age: 33
End: 2018-12-06
Attending: PSYCHIATRY & NEUROLOGY
Payer: COMMERCIAL

## 2018-12-06 DIAGNOSIS — D64.9 ANEMIA, UNSPECIFIED TYPE: ICD-10-CM

## 2018-12-06 DIAGNOSIS — R71.8 ELEVATED MCV: ICD-10-CM

## 2018-12-06 LAB
BASOPHILS # BLD AUTO: 0.03 K/UL
BASOPHILS NFR BLD: 0.6 %
DIFFERENTIAL METHOD: ABNORMAL
EOSINOPHIL # BLD AUTO: 0.1 K/UL
EOSINOPHIL NFR BLD: 1.2 %
ERYTHROCYTE [DISTWIDTH] IN BLOOD BY AUTOMATED COUNT: 11.5 %
FERRITIN SERPL-MCNC: 95 NG/ML
HCT VFR BLD AUTO: 32.7 %
HGB BLD-MCNC: 11 G/DL
IMM GRANULOCYTES # BLD AUTO: 0.02 K/UL
IMM GRANULOCYTES NFR BLD AUTO: 0.4 %
LYMPHOCYTES # BLD AUTO: 1 K/UL
LYMPHOCYTES NFR BLD: 19.1 %
MCH RBC QN AUTO: 33 PG
MCHC RBC AUTO-ENTMCNC: 33.6 G/DL
MCV RBC AUTO: 98 FL
MONOCYTES # BLD AUTO: 0.3 K/UL
MONOCYTES NFR BLD: 6.6 %
NEUTROPHILS # BLD AUTO: 3.8 K/UL
NEUTROPHILS NFR BLD: 72.1 %
NRBC BLD-RTO: 0 /100 WBC
PLATELET # BLD AUTO: 119 K/UL
PMV BLD AUTO: 11.1 FL
RBC # BLD AUTO: 3.33 M/UL
VIT B12 SERPL-MCNC: 498 PG/ML
WBC # BLD AUTO: 5.19 K/UL

## 2018-12-06 PROCEDURE — 82728 ASSAY OF FERRITIN: CPT

## 2018-12-06 PROCEDURE — 85025 COMPLETE CBC W/AUTO DIFF WBC: CPT

## 2018-12-06 PROCEDURE — 82607 VITAMIN B-12: CPT

## 2018-12-06 PROCEDURE — 36415 COLL VENOUS BLD VENIPUNCTURE: CPT | Mod: PO

## 2018-12-07 ENCOUNTER — PATIENT MESSAGE (OUTPATIENT)
Dept: NEUROLOGY | Facility: CLINIC | Age: 33
End: 2018-12-07

## 2018-12-13 ENCOUNTER — PATIENT MESSAGE (OUTPATIENT)
Dept: NEUROLOGY | Facility: CLINIC | Age: 33
End: 2018-12-13

## 2018-12-26 ENCOUNTER — PATIENT MESSAGE (OUTPATIENT)
Dept: NEUROLOGY | Facility: CLINIC | Age: 33
End: 2018-12-26

## 2018-12-26 DIAGNOSIS — Z76.89 ENCOUNTER TO ESTABLISH CARE: ICD-10-CM

## 2018-12-26 DIAGNOSIS — G35 MULTIPLE SCLEROSIS: Primary | ICD-10-CM

## 2019-01-03 ENCOUNTER — TELEPHONE (OUTPATIENT)
Dept: PHARMACY | Facility: CLINIC | Age: 34
End: 2019-01-03

## 2019-01-10 ENCOUNTER — PATIENT MESSAGE (OUTPATIENT)
Dept: OBSTETRICS AND GYNECOLOGY | Facility: CLINIC | Age: 34
End: 2019-01-10

## 2019-01-15 ENCOUNTER — TELEPHONE (OUTPATIENT)
Dept: PHARMACY | Facility: CLINIC | Age: 34
End: 2019-01-15

## 2019-01-15 NOTE — TELEPHONE ENCOUNTER
Called patient for follow up and refill for Rebif. Patient confirmed that she has 2 more doses on hand for the rest of this week. Medication will be shipped on 1/17 for patient to receive on 1/18. Patient has been on medication for some time and does not have any issues with injecting. She injects on M/W/F and confirms no missed doses. She also makes sure to rotate her injection sites. Medication list reviewed; no new meds, allergies or health conditions. Patient is overall doing very well on Rebif and denies any side effects at this time. She states that she has a couple of days with minor flare ups that she believes was related to stress at work. She did not need to take any medication for her flare ups. She rates her health a 9/10 (10 being the best). Patient aware to reach out to [facility] if she has any questions or concerns.

## 2019-02-12 ENCOUNTER — TELEPHONE (OUTPATIENT)
Dept: PHARMACY | Facility: CLINIC | Age: 34
End: 2019-02-12

## 2019-02-12 NOTE — TELEPHONE ENCOUNTER
Patient reached regard specialty medication refill for Rebif 44mcg/0.5mL $0/004- Patient scheduled to have medication ship out on Thurs 2/21 to arrive on Fri 2/22 address confirmed/prescription address. Patient informed no new medications, conditions or allergies since last talked to [facility]. Patient have 5 injection remaining & no missed dose. Patient declined questions for the clinical pharmacist. Patient voiced understanding.     Prescription Address:   Tues-Fri 10am-8pm  Attn: King Perez; [address] [place] [address]

## 2019-03-08 ENCOUNTER — PATIENT MESSAGE (OUTPATIENT)
Dept: NEUROLOGY | Facility: CLINIC | Age: 34
End: 2019-03-08

## 2019-03-12 ENCOUNTER — OFFICE VISIT (OUTPATIENT)
Dept: OBSTETRICS AND GYNECOLOGY | Facility: CLINIC | Age: 34
End: 2019-03-12
Payer: COMMERCIAL

## 2019-03-12 VITALS
HEIGHT: 66 IN | WEIGHT: 145.31 LBS | BODY MASS INDEX: 23.35 KG/M2 | DIASTOLIC BLOOD PRESSURE: 72 MMHG | SYSTOLIC BLOOD PRESSURE: 116 MMHG

## 2019-03-12 DIAGNOSIS — G35 MULTIPLE SCLEROSIS, RELAPSING-REMITTING: ICD-10-CM

## 2019-03-12 DIAGNOSIS — Z30.41 ENCOUNTER FOR SURVEILLANCE OF CONTRACEPTIVE PILLS: ICD-10-CM

## 2019-03-12 DIAGNOSIS — Z01.419 WELL WOMAN EXAM WITH ROUTINE GYNECOLOGICAL EXAM: Primary | ICD-10-CM

## 2019-03-12 DIAGNOSIS — Z12.4 PAP SMEAR FOR CERVICAL CANCER SCREENING: ICD-10-CM

## 2019-03-12 PROCEDURE — 99999 PR PBB SHADOW E&M-EST. PATIENT-LVL III: ICD-10-PCS | Mod: PBBFAC,,, | Performed by: NURSE PRACTITIONER

## 2019-03-12 PROCEDURE — 99999 PR PBB SHADOW E&M-EST. PATIENT-LVL III: CPT | Mod: PBBFAC,,, | Performed by: NURSE PRACTITIONER

## 2019-03-12 PROCEDURE — 99395 PR PREVENTIVE VISIT,EST,18-39: ICD-10-PCS | Mod: S$GLB,,, | Performed by: NURSE PRACTITIONER

## 2019-03-12 PROCEDURE — 88175 CYTOPATH C/V AUTO FLUID REDO: CPT

## 2019-03-12 PROCEDURE — 87624 HPV HI-RISK TYP POOLED RSLT: CPT

## 2019-03-12 PROCEDURE — 99395 PREV VISIT EST AGE 18-39: CPT | Mod: S$GLB,,, | Performed by: NURSE PRACTITIONER

## 2019-03-12 RX ORDER — DROSPIRENONE AND ETHINYL ESTRADIOL TABLETS 0.02-3(28)
KIT ORAL
Refills: 3 | COMMUNITY
Start: 2019-01-11 | End: 2019-03-12 | Stop reason: SDUPTHER

## 2019-03-12 RX ORDER — DROSPIRENONE AND ETHINYL ESTRADIOL TABLETS 0.02-3(28)
1 KIT ORAL DAILY
Qty: 90 TABLET | Refills: 3 | Status: SHIPPED | OUTPATIENT
Start: 2019-03-12 | End: 2019-06-05 | Stop reason: SDUPTHER

## 2019-03-12 NOTE — PROGRESS NOTES
CC: Annual  HPI: Pt is a 34 y.o.  female who presents for routine annual exam. She uses [facility] for contraception. She does need a refill today. She and her  are thinking of attempting to conceive in the near future. She discussed this with her neurologist and both agreed to wait until she is done with the Rebif injections. She does not want STD screening. [place] in [place]. No problems today.     Last pap in 2017 WNL    ROS:  GENERAL: Feeling well overall. Denies fever or chills.   SKIN: Denies rash or lesions.   HEAD: Denies head injury or headache.   NODES: Denies enlarged lymph nodes.   CHEST: Denies chest pain or shortness of breath.   CARDIOVASCULAR: Denies palpitations or left sided chest pain.   ABDOMEN: No abdominal pain, constipation, diarrhea, nausea, vomiting or rectal bleeding.   URINARY: No dysuria, hematuria, or burning on urination.  REPRODUCTIVE: See HPI.   BREASTS: Denies pain, lumps, or nipple discharge.   HEMATOLOGIC: No easy bruisability or excessive bleeding.   MUSCULOSKELETAL: Denies joint pain or swelling.   NEUROLOGIC: Denies syncope or weakness.   PSYCHIATRIC: Denies depression, anxiety or mood swings.    PE:   APPEARANCE: Well nourished, well developed, White female in no acute distress.  NODES: no cervical, supraclavicular, or inguinal lymphadenopathy  BREASTS: Symmetrical, no skin changes or visible lesions. No palpable masses, nipple discharge or adenopathy bilaterally.  ABDOMEN: Soft. No tenderness or masses. No distention. No hernias palpated. No CVA tenderness.  VULVA: No lesions. Normal external female genitalia.  URETHRAL MEATUS: Normal size and location, no lesions, no prolapse.  URETHRA: No masses, tenderness, or prolapse.  VAGINA: Moist. No lesions or lacerations noted. No abnormal discharge present. No odor present.   CERVIX: No lesions or discharge. No cervical motion tenderness.   UTERUS: Normal size, regular shape, mobile, non-tender.  ADNEXA: No tenderness. No  fullness or masses palpated in the adnexal regions.   ANUS PERINEUM: Normal.      Diagnosis:  1. Well woman exam with routine gynecological exam    2. Encounter for surveillance of contraceptive pills    3. Multiple sclerosis, relapsing-remitting    4. Pap smear for cervical cancer screening        Plan:   1. Pap updated since none in chart  2. [facility] refilled, The use of the oral contraceptive has been fully discussed with the patient. This includes the proper method to initiate and continue the pills, the need for regular compliance to ensure adequate contraceptive effect, the physiology which make the pill effective, the instructions for what to do in event of a missed pill, and warnings about anticipated minor side effects such as breakthrough spotting, nausea, breast tenderness, weight changes, acne, headaches, etc.  She has been told of the more serious potential side effects such as MI, stroke, and deep vein thrombosis, all of which are very unlikely.  She has been asked to report any signs of such serious problems immediately.  She should back up the pill with a condom during any cycle in which antibiotics are prescribed, and during the first cycle as well. The need for additional protection, such as a condom, to prevent exposure to sexually transmitted diseases has also been discussed- the patient has been clearly reminded that OCP's cannot protect her against diseases such as HIV and others. She understands and wishes to take the medication as prescribed.   3. Encouraged prenatal vitamin now    Patient was counseled today on the new ACS guidelines for cervical cytology screening as well as the current recommendations for breast cancer screening. She was counseled to follow up with her PCP for other routine health maintenance. Counseling session lasted approximately 10 minutes, and all her questions were answered.    Follow-up with me in 1 year for routine exam; pap in 3 years.

## 2019-03-14 DIAGNOSIS — G35 MULTIPLE SCLEROSIS: ICD-10-CM

## 2019-03-15 LAB
HPV HR 12 DNA CVX QL NAA+PROBE: NEGATIVE
HPV16 AG SPEC QL: NEGATIVE
HPV18 DNA SPEC QL NAA+PROBE: NEGATIVE

## 2019-03-18 ENCOUNTER — TELEPHONE (OUTPATIENT)
Dept: PHARMACY | Facility: CLINIC | Age: 34
End: 2019-03-18

## 2019-03-20 NOTE — PROGRESS NOTES
INTERNAL MEDICINE INITIAL VISIT NOTE      CHIEF COMPLAINT     Annual and est care    HPI     King Perez is a 34 y.o. C female who presents to est care. On loryna daily - continuous pack.     Normocytic anemia - Hgb around 11.    MS - symptoms started about 6 yrs ago (starts w/ LLE numbness/tingling; fatigue) and dx 4 yrs ago. Residual decreased feelings in L leg.   Last flareup was 7/2018 - tx w/ steroids.   Currently maintained on interferon beta (started 3 yrs ago) 3x/week.     Neg Pap 3/12/19    Past Medical History:  Past Medical History:   Diagnosis Date    Multiple sclerosis        Past Surgical History:  Past Surgical History:   Procedure Laterality Date    CERVICAL BIOPSY  W/ LOOP ELECTRODE EXCISION      DILATION AND CURETTAGE OF UTERUS  2014       Allergies:  Review of patient's allergies indicates:   Allergen Reactions    Iodine and iodide containing products Rash       Home Medications:  Prior to Admission medications    Medication Sig Start Date End Date Taking? Authorizing Provider   B complex-C-E-Zn Tab Take 1 tablet by mouth once daily.    Historical Provider, MD   dexamethasone (DECADRON) 6 MG tablet Take 13 tablets (78mg) by mouth daily for 3 days. 6/29/18   Marci Hernandez MD   interferon beta-1a, albumin, (REBIF REBIDOSE) 44 mcg/0.5 mL PnIj Inject 44 mcg into the skin 3 (three) times a week. 3/15/19   Marci Hernandez MD   LORYNA, 28, 3-0.02 mg per tablet Take 1 tablet by mouth once daily. 3/12/19   Viviane Martinez, TING   methylPREDNISolone sodium succinate (SOLU-MEDROL) 1,000 mg injection Take 1,000mg dissolved in a smoothie daily x 3 days. 6/29/18   Marci Hernandez MD   TURMERIC ROOT EXTRACT ORAL Take by mouth.    Historical Provider, MD   valACYclovir (VALTREX) 1000 MG tablet Take 1 tablet (1,000 mg total) by mouth 2 (two) times daily as needed. 11/30/18 11/30/19  Marci Hernandez MD   vitamin D 1000 units Tab Take 5,000 Units by mouth once daily.     Historical  "Provider, MD       Family History:  Family History   Problem Relation Age of Onset    Hypertension Mother     No Known Problems Father     Cancer Maternal Grandmother         brain tumor - later in life    Cancer Paternal Grandmother 60        breast CA       Social History:  Social History     Tobacco Use    Smoking status: Never Smoker    Smokeless tobacco: Never Used   Substance Use Topics    Alcohol use: Yes     Comment: socially    Drug use: No       Review of Systems:  Review of Systems   Constitutional: Positive for chills (on nights she takes interferon. takes ibuprofen prn.). Negative for activity change, fatigue and unexpected weight change.   HENT: Negative for congestion, hearing loss, postnasal drip, rhinorrhea and trouble swallowing.    Eyes: Negative for discharge and visual disturbance.   Respiratory: Negative for cough, chest tightness, shortness of breath and wheezing.    Cardiovascular: Negative for chest pain and palpitations.   Gastrointestinal: Negative for abdominal pain, blood in stool, constipation, diarrhea and vomiting.   Endocrine: Negative for polydipsia and polyuria.   Genitourinary: Positive for frequency (drinks a lot of water. no changes). Negative for difficulty urinating, dysuria, hematuria and menstrual problem.   Musculoskeletal: Negative for arthralgias, joint swelling and neck pain.   Skin: Negative for rash.   Neurological: Positive for numbness (decreased sensation in the LLE since first MS eps). Negative for weakness and headaches.   Psychiatric/Behavioral: Positive for sleep disturbance (reports wake up at 4am in the morning for a stretch of time.). Negative for confusion and dysphoric mood. The patient is not nervous/anxious.        Health Maintainence:   Td - uncertain.  Flu - declines.  Pap 3/12/19    PHYSICAL EXAM     /64 (BP Location: Left arm, Patient Position: Sitting, BP Method: Medium (Manual))   Pulse 70   Temp 98.1 °F (36.7 °C)   Ht 5' 6" (1.676 " m)   Wt 66.8 kg (147 lb 4.3 oz)   LMP  (LMP Unknown)   BMI 23.77 kg/m²     GEN - A+OX4, NAD   HEENT - PERRL, EOMI, OP clear. MMM.   Neck - No thyromegaly or cervical LAD. No thyroid masses felt.  CV - RRR, no m/r   Chest - CTAB, no wheezing or rhonchi  Abd - S/NT/ND/+BS.   Ext - 2+BDP and radial pulses. No LE edema.   Neuro - PERRL, EOMI, no nystagmus, eyebrow raise, facial sensation, hearing, m of mastication, smile, palatal raise, shoulder shrug, tongue protrusion symmetric and intact. 5/5 BUE and BLE strength (mildly decreased strength at the L hip flexion). Sensation to light touch intact throughout except dec at the L leg. 2+ DTRs. Normal gait.   MSK - No spinal tenderness to palpation. Normal gait.   Skin - No rash.    LABS     Previous labs reviewed.    ASSESSMENT/PLAN     King Perez is a 34 y.o. female with  King was seen today for establish care.    Diagnoses and all orders for this visit:    Annual physical exam  -     CBC auto differential; Future; Expected date: 03/21/2019  -     Comprehensive metabolic panel; Future; Expected date: 03/21/2019  -     Hemoglobin A1c; Future; Expected date: 03/21/2019  -     Lipid panel; Future; Expected date: 03/21/2019  -     TSH; Future; Expected date: 03/21/2019    Normocytic anemia  -     CBC auto differential; Future; Expected date: 03/21/2019  -     Ferritin; Future; Expected date: 03/21/2019  -     Iron and TIBC; Future; Expected date: 03/21/2019  -     Folate; Future; Expected date: 03/21/2019  -     Vitamin B12; Future; Expected date: 03/21/2019  -     HEMOGLOBIN ELECTROPHORESIS,HGB A2 RAQUEL.; Future; Expected date: 03/21/2019    Thrombocytopenia - check liver enzymes. Possibly interferon related although has been on this for several years and only recently developed low PLT.  -     HIV 1/2 Ag/Ab (4th Gen); Future; Expected date: 03/21/2019  -     Hepatitis C antibody; Future; Expected date: 03/21/2019    Uncertain about Tdap. Discussed if any  cuts, go ahead and do Td.   RTC in 12 months, sooner if needed and depending on labs.    Amanda Vela MD  Department of Internal Medicine - [facility]  4:16 PM

## 2019-03-21 ENCOUNTER — OFFICE VISIT (OUTPATIENT)
Dept: INTERNAL MEDICINE | Facility: CLINIC | Age: 34
End: 2019-03-21
Payer: COMMERCIAL

## 2019-03-21 ENCOUNTER — LAB VISIT (OUTPATIENT)
Dept: LAB | Facility: HOSPITAL | Age: 34
End: 2019-03-21
Attending: INTERNAL MEDICINE
Payer: COMMERCIAL

## 2019-03-21 VITALS
SYSTOLIC BLOOD PRESSURE: 108 MMHG | BODY MASS INDEX: 23.66 KG/M2 | HEART RATE: 70 BPM | HEIGHT: 66 IN | WEIGHT: 147.25 LBS | DIASTOLIC BLOOD PRESSURE: 64 MMHG | TEMPERATURE: 98 F

## 2019-03-21 DIAGNOSIS — Z00.00 ANNUAL PHYSICAL EXAM: Primary | ICD-10-CM

## 2019-03-21 DIAGNOSIS — Z00.00 ANNUAL PHYSICAL EXAM: ICD-10-CM

## 2019-03-21 DIAGNOSIS — D69.6 THROMBOCYTOPENIA: ICD-10-CM

## 2019-03-21 DIAGNOSIS — D64.9 NORMOCYTIC ANEMIA: ICD-10-CM

## 2019-03-21 LAB
ALBUMIN SERPL BCP-MCNC: 3.6 G/DL
ALP SERPL-CCNC: 39 U/L
ALT SERPL W/O P-5'-P-CCNC: 30 U/L
ANION GAP SERPL CALC-SCNC: 8 MMOL/L
AST SERPL-CCNC: 28 U/L
BASOPHILS # BLD AUTO: 0.01 K/UL
BASOPHILS NFR BLD: 0.3 %
BILIRUB SERPL-MCNC: 0.4 MG/DL
BUN SERPL-MCNC: 13 MG/DL
CALCIUM SERPL-MCNC: 9 MG/DL
CHLORIDE SERPL-SCNC: 105 MMOL/L
CHOLEST SERPL-MCNC: 171 MG/DL
CHOLEST/HDLC SERPL: 2.1 {RATIO}
CO2 SERPL-SCNC: 24 MMOL/L
CREAT SERPL-MCNC: 0.7 MG/DL
DIFFERENTIAL METHOD: ABNORMAL
EOSINOPHIL # BLD AUTO: 0.1 K/UL
EOSINOPHIL NFR BLD: 1.5 %
ERYTHROCYTE [DISTWIDTH] IN BLOOD BY AUTOMATED COUNT: 11.8 %
EST. GFR  (AFRICAN AMERICAN): >60 ML/MIN/1.73 M^2
EST. GFR  (NON AFRICAN AMERICAN): >60 ML/MIN/1.73 M^2
ESTIMATED AVG GLUCOSE: 97 MG/DL
FERRITIN SERPL-MCNC: 93 NG/ML
FOLATE SERPL-MCNC: 14.5 NG/ML
GLUCOSE SERPL-MCNC: 92 MG/DL
HBA1C MFR BLD HPLC: 5 %
HCT VFR BLD AUTO: 33.8 %
HCV AB SERPL QL IA: NEGATIVE
HDLC SERPL-MCNC: 83 MG/DL
HDLC SERPL: 48.5 %
HGB BLD-MCNC: 11.3 G/DL
HIV 1+2 AB+HIV1 P24 AG SERPL QL IA: NEGATIVE
IRON SERPL-MCNC: 131 UG/DL
LDLC SERPL CALC-MCNC: 70.6 MG/DL
LYMPHOCYTES # BLD AUTO: 1.3 K/UL
LYMPHOCYTES NFR BLD: 33.8 %
MCH RBC QN AUTO: 32.9 PG
MCHC RBC AUTO-ENTMCNC: 33.4 G/DL
MCV RBC AUTO: 99 FL
MONOCYTES # BLD AUTO: 0.2 K/UL
MONOCYTES NFR BLD: 6 %
NEUTROPHILS # BLD AUTO: 2.3 K/UL
NEUTROPHILS NFR BLD: 58.1 %
NONHDLC SERPL-MCNC: 88 MG/DL
PLATELET # BLD AUTO: 189 K/UL
PMV BLD AUTO: 9.1 FL
POTASSIUM SERPL-SCNC: 4 MMOL/L
PROT SERPL-MCNC: 6.9 G/DL
RBC # BLD AUTO: 3.43 M/UL
SATURATED IRON: 32 %
SODIUM SERPL-SCNC: 137 MMOL/L
TOTAL IRON BINDING CAPACITY: 411 UG/DL
TRANSFERRIN SERPL-MCNC: 278 MG/DL
TRIGL SERPL-MCNC: 87 MG/DL
TSH SERPL DL<=0.005 MIU/L-ACNC: 0.89 UIU/ML
VIT B12 SERPL-MCNC: 448 PG/ML
WBC # BLD AUTO: 3.97 K/UL

## 2019-03-21 PROCEDURE — 99385 PREV VISIT NEW AGE 18-39: CPT | Mod: S$GLB,,, | Performed by: INTERNAL MEDICINE

## 2019-03-21 PROCEDURE — 84443 ASSAY THYROID STIM HORMONE: CPT

## 2019-03-21 PROCEDURE — 86803 HEPATITIS C AB TEST: CPT

## 2019-03-21 PROCEDURE — 83036 HEMOGLOBIN GLYCOSYLATED A1C: CPT

## 2019-03-21 PROCEDURE — 36415 COLL VENOUS BLD VENIPUNCTURE: CPT

## 2019-03-21 PROCEDURE — 83540 ASSAY OF IRON: CPT

## 2019-03-21 PROCEDURE — 80053 COMPREHEN METABOLIC PANEL: CPT

## 2019-03-21 PROCEDURE — 82607 VITAMIN B-12: CPT

## 2019-03-21 PROCEDURE — 86703 HIV-1/HIV-2 1 RESULT ANTBDY: CPT

## 2019-03-21 PROCEDURE — 99999 PR PBB SHADOW E&M-EST. PATIENT-LVL III: ICD-10-PCS | Mod: PBBFAC,,, | Performed by: INTERNAL MEDICINE

## 2019-03-21 PROCEDURE — 82728 ASSAY OF FERRITIN: CPT

## 2019-03-21 PROCEDURE — 99999 PR PBB SHADOW E&M-EST. PATIENT-LVL III: CPT | Mod: PBBFAC,,, | Performed by: INTERNAL MEDICINE

## 2019-03-21 PROCEDURE — 82746 ASSAY OF FOLIC ACID SERUM: CPT

## 2019-03-21 PROCEDURE — 80061 LIPID PANEL: CPT

## 2019-03-21 PROCEDURE — 99385 PR PREVENTIVE VISIT,NEW,18-39: ICD-10-PCS | Mod: S$GLB,,, | Performed by: INTERNAL MEDICINE

## 2019-03-21 PROCEDURE — 83020 HEMOGLOBIN ELECTROPHORESIS: CPT

## 2019-03-21 PROCEDURE — 85025 COMPLETE CBC W/AUTO DIFF WBC: CPT

## 2019-03-22 ENCOUNTER — PATIENT MESSAGE (OUTPATIENT)
Dept: INTERNAL MEDICINE | Facility: CLINIC | Age: 34
End: 2019-03-22

## 2019-03-22 ENCOUNTER — TELEPHONE (OUTPATIENT)
Dept: INTERNAL MEDICINE | Facility: CLINIC | Age: 34
End: 2019-03-22

## 2019-03-22 LAB
HGB A2 MFR BLD HPLC: 2.9 %
HGB FRACT BLD ELPH-IMP: NORMAL
HGB FRACT BLD ELPH-IMP: NORMAL

## 2019-03-22 NOTE — TELEPHONE ENCOUNTER
Please call and notify pt:    Hive, hep C, iron, folate, b12 levels normal.   No diabetes. Cholesterol looks great. Thyroid, liver and kidney functions normal. Anemia is very mild and has improved. I don't think we need further work up at this time as I think it's due to the interferon. Her platelets normalized. However if she's worried, I can send her to see a hematologist for further work up.

## 2019-04-02 ENCOUNTER — TELEPHONE (OUTPATIENT)
Dept: PHARMACY | Facility: CLINIC | Age: 34
End: 2019-04-02

## 2019-04-29 ENCOUNTER — OFFICE VISIT (OUTPATIENT)
Dept: NEUROLOGY | Facility: CLINIC | Age: 34
End: 2019-04-29
Payer: COMMERCIAL

## 2019-04-29 VITALS
WEIGHT: 144.94 LBS | HEART RATE: 70 BPM | HEIGHT: 65 IN | BODY MASS INDEX: 24.15 KG/M2 | DIASTOLIC BLOOD PRESSURE: 72 MMHG | SYSTOLIC BLOOD PRESSURE: 112 MMHG

## 2019-04-29 DIAGNOSIS — Z29.89 PROPHYLACTIC IMMUNOTHERAPY: ICD-10-CM

## 2019-04-29 DIAGNOSIS — R53.83 FATIGUE, UNSPECIFIED TYPE: ICD-10-CM

## 2019-04-29 DIAGNOSIS — Z71.89 COUNSELING REGARDING GOALS OF CARE: ICD-10-CM

## 2019-04-29 DIAGNOSIS — G35 MULTIPLE SCLEROSIS: Primary | ICD-10-CM

## 2019-04-29 PROCEDURE — 3008F PR BODY MASS INDEX (BMI) DOCUMENTED: ICD-10-PCS | Mod: CPTII,S$GLB,, | Performed by: CLINICAL NURSE SPECIALIST

## 2019-04-29 PROCEDURE — 99999 PR PBB SHADOW E&M-EST. PATIENT-LVL III: ICD-10-PCS | Mod: PBBFAC,,, | Performed by: CLINICAL NURSE SPECIALIST

## 2019-04-29 PROCEDURE — 99214 PR OFFICE/OUTPT VISIT, EST, LEVL IV, 30-39 MIN: ICD-10-PCS | Mod: S$GLB,,, | Performed by: CLINICAL NURSE SPECIALIST

## 2019-04-29 PROCEDURE — 99214 OFFICE O/P EST MOD 30 MIN: CPT | Mod: S$GLB,,, | Performed by: CLINICAL NURSE SPECIALIST

## 2019-04-29 PROCEDURE — 99999 PR PBB SHADOW E&M-EST. PATIENT-LVL III: CPT | Mod: PBBFAC,,, | Performed by: CLINICAL NURSE SPECIALIST

## 2019-04-29 PROCEDURE — 3008F BODY MASS INDEX DOCD: CPT | Mod: CPTII,S$GLB,, | Performed by: CLINICAL NURSE SPECIALIST

## 2019-04-29 NOTE — Clinical Note
Here is her check out from the appt. Thanks. 1.) Vit D lab in [facility] tmrw morning2.) F/U with BB in September 3.) MRI brain in November

## 2019-04-29 NOTE — Clinical Note
BEN--I couldn't get any reflexes in her lower extremities. I tried the distraction technique, but nothing... Rest of exam was great.

## 2019-04-30 ENCOUNTER — LAB VISIT (OUTPATIENT)
Dept: LAB | Facility: HOSPITAL | Age: 34
End: 2019-04-30
Attending: PSYCHIATRY & NEUROLOGY
Payer: COMMERCIAL

## 2019-04-30 DIAGNOSIS — G35 MULTIPLE SCLEROSIS: ICD-10-CM

## 2019-04-30 LAB — 25(OH)D3+25(OH)D2 SERPL-MCNC: 84 NG/ML (ref 30–96)

## 2019-04-30 PROCEDURE — 82306 VITAMIN D 25 HYDROXY: CPT

## 2019-04-30 PROCEDURE — 36415 COLL VENOUS BLD VENIPUNCTURE: CPT | Mod: PO

## 2019-05-10 ENCOUNTER — TELEPHONE (OUTPATIENT)
Dept: PHARMACY | Facility: CLINIC | Age: 34
End: 2019-05-10

## 2019-05-10 NOTE — TELEPHONE ENCOUNTER
Call attempt 1 for Rebif refill and follow-up. LVM and sent Referly message. $0.00 copay at 004.     Lenny Washington, PharmD  Clinical Pharmacist  [facility]  P: [phone]

## 2019-05-16 ENCOUNTER — TELEPHONE (OUTPATIENT)
Dept: PHARMACY | Facility: CLINIC | Age: 34
End: 2019-05-16

## 2019-05-17 NOTE — TELEPHONE ENCOUNTER
Refill and followup call for Rebif. No answer, left voicemail.    Evelio Harmon, PharmD  Clinical Pharmacist  [facility]  P: [phone]

## 2019-05-23 ENCOUNTER — TELEPHONE (OUTPATIENT)
Dept: PHARMACY | Facility: CLINIC | Age: 34
End: 2019-05-23

## 2019-05-23 NOTE — TELEPHONE ENCOUNTER
Spoke to patient regarding Rebif refill. Verified to ship on Tuesday 5/28 for delivery on Wednesday 5/29. $0.00 copay at 004.     Lenny Washington, PharmD  Clinical Pharmacist  [facility]  P: [phone]

## 2019-06-04 DIAGNOSIS — B00.9 DISEASE OF GINGIVA DUE TO RECURRENT ORAL HERPES SIMPLEX VIRUS (HSV) INFECTION: ICD-10-CM

## 2019-06-04 DIAGNOSIS — K06.9 DISEASE OF GINGIVA DUE TO RECURRENT ORAL HERPES SIMPLEX VIRUS (HSV) INFECTION: ICD-10-CM

## 2019-06-04 RX ORDER — VALACYCLOVIR HYDROCHLORIDE 1 G/1
1000 TABLET, FILM COATED ORAL 2 TIMES DAILY PRN
Qty: 30 TABLET | Refills: 5 | Status: SHIPPED | OUTPATIENT
Start: 2019-06-04 | End: 2020-04-07

## 2019-06-05 ENCOUNTER — PATIENT MESSAGE (OUTPATIENT)
Dept: OBSTETRICS AND GYNECOLOGY | Facility: CLINIC | Age: 34
End: 2019-06-05

## 2019-06-05 ENCOUNTER — PATIENT MESSAGE (OUTPATIENT)
Dept: NEUROLOGY | Facility: CLINIC | Age: 34
End: 2019-06-05

## 2019-06-05 RX ORDER — DROSPIRENONE AND ETHINYL ESTRADIOL TABLETS 0.02-3(28)
1 KIT ORAL DAILY
Qty: 90 TABLET | Refills: 3 | Status: SHIPPED | OUTPATIENT
Start: 2019-06-05 | End: 2020-02-03 | Stop reason: SDUPTHER

## 2019-06-06 ENCOUNTER — PATIENT MESSAGE (OUTPATIENT)
Dept: NEUROLOGY | Facility: CLINIC | Age: 34
End: 2019-06-06

## 2019-06-06 RX ORDER — DROSPIRENONE AND ETHINYL ESTRADIOL 0.02-3(28)
1 KIT ORAL DAILY
Qty: 90 TABLET | Refills: 3 | Status: SHIPPED | OUTPATIENT
Start: 2019-06-06 | End: 2019-07-06

## 2019-06-06 RX ORDER — DROSPIRENONE AND ETHINYL ESTRADIOL 0.02-3(28)
1 KIT ORAL DAILY
Qty: 90 TABLET | Refills: 3 | OUTPATIENT
Start: 2019-06-06 | End: 2019-07-06

## 2019-06-19 ENCOUNTER — TELEPHONE (OUTPATIENT)
Dept: PHARMACY | Facility: CLINIC | Age: 34
End: 2019-06-19

## 2019-06-19 NOTE — TELEPHONE ENCOUNTER
Call attempt 1 for Rebif refill and to assess adherence - LVM and MyChart message sent. Copay $0 at 004.    Maximino Kramer, PharmD  Clinical Pharmacist   [facility]   P: [phone]

## 2019-06-21 NOTE — TELEPHONE ENCOUNTER
Call attempt 2 for Rebif refill and to assess adherence - LVM and MyChart message sent. Copay $0 at 004.

## 2019-06-27 ENCOUNTER — TELEPHONE (OUTPATIENT)
Dept: PHARMACY | Facility: CLINIC | Age: 34
End: 2019-06-27

## 2019-06-27 NOTE — TELEPHONE ENCOUNTER
Refill and followup call for Rebif. No answer, left voicemail. Sent MyChart.    Evelio Harmon, PharmD  Clinical Pharmacist  [facility]  P: [phone]

## 2019-06-28 NOTE — TELEPHONE ENCOUNTER
Refill and followup call for Rebif. Patient confirmed need of the refill. Will deliver Via [employer] on  to arrive on  with patient consent. Copay $0.00 at 004. Address confirmed. Patient has 4 doses remaining (7 day supply)/ next injection due . Patient denies missed doses and no side effects, although patient should have no doses left.  No new medications/allergies/medical conditions. Labs are stable. No ER/Urgent care visits in past month. Sharps container needed. Patient taking the medication as directed. Patient denies any further questions. Confirmed 2 patient identifiers - Name and .    Evelio Harmon, PharmD  Clinical Pharmacist  [facility]  P: [phone]

## 2019-07-23 ENCOUNTER — TELEPHONE (OUTPATIENT)
Dept: PHARMACY | Facility: CLINIC | Age: 34
End: 2019-07-23

## 2019-07-25 DIAGNOSIS — G35 MULTIPLE SCLEROSIS: ICD-10-CM

## 2019-08-01 ENCOUNTER — TELEPHONE (OUTPATIENT)
Dept: PHARMACY | Facility: CLINIC | Age: 34
End: 2019-08-01

## 2019-09-20 ENCOUNTER — TELEPHONE (OUTPATIENT)
Dept: PHARMACY | Facility: CLINIC | Age: 34
End: 2019-09-20

## 2019-09-20 NOTE — TELEPHONE ENCOUNTER
Call attempt 1 for Rebif refill and clinical follow up - LVM and MyChart message sent. Copay $0 at 004.    Maximino Kramer, PharmD  Clinical Pharmacist   [facility]   P: [phone]

## 2019-09-21 ENCOUNTER — PATIENT MESSAGE (OUTPATIENT)
Dept: NEUROLOGY | Facility: CLINIC | Age: 34
End: 2019-09-21

## 2019-09-23 ENCOUNTER — PATIENT MESSAGE (OUTPATIENT)
Dept: NEUROLOGY | Facility: CLINIC | Age: 34
End: 2019-09-23

## 2019-09-27 ENCOUNTER — TELEPHONE (OUTPATIENT)
Dept: PHARMACY | Facility: CLINIC | Age: 34
End: 2019-09-27

## 2019-09-27 ENCOUNTER — OFFICE VISIT (OUTPATIENT)
Dept: URGENT CARE | Facility: CLINIC | Age: 34
End: 2019-09-27
Payer: COMMERCIAL

## 2019-09-27 VITALS
RESPIRATION RATE: 18 BRPM | SYSTOLIC BLOOD PRESSURE: 100 MMHG | HEIGHT: 65 IN | HEART RATE: 67 BPM | BODY MASS INDEX: 23.99 KG/M2 | OXYGEN SATURATION: 98 % | DIASTOLIC BLOOD PRESSURE: 69 MMHG | WEIGHT: 144 LBS | TEMPERATURE: 97 F

## 2019-09-27 DIAGNOSIS — T80.90XA INJECTION SITE REACTION, INITIAL ENCOUNTER: Primary | ICD-10-CM

## 2019-09-27 PROCEDURE — 99214 OFFICE O/P EST MOD 30 MIN: CPT | Mod: S$GLB,,, | Performed by: FAMILY MEDICINE

## 2019-09-27 PROCEDURE — 3008F PR BODY MASS INDEX (BMI) DOCUMENTED: ICD-10-PCS | Mod: CPTII,S$GLB,, | Performed by: FAMILY MEDICINE

## 2019-09-27 PROCEDURE — 99214 PR OFFICE/OUTPT VISIT, EST, LEVL IV, 30-39 MIN: ICD-10-PCS | Mod: S$GLB,,, | Performed by: FAMILY MEDICINE

## 2019-09-27 PROCEDURE — 3008F BODY MASS INDEX DOCD: CPT | Mod: CPTII,S$GLB,, | Performed by: FAMILY MEDICINE

## 2019-09-27 NOTE — TELEPHONE ENCOUNTER
2nd Refill and followup call for Rebif. No answer, left voicemail. Sent MyChart.    Evelio Harmon, PharmD  Clinical Pharmacist  [facility]  P: [phone]

## 2019-09-28 NOTE — PATIENT INSTRUCTIONS
PLEASE READ YOUR DISCHARGE INSTRUCTIONS ENTIRELY AS IT CONTAINS IMPORTANT INFORMATION.    Continue cool compresses on the area    Ibuprofen as you have been taking it    Do not inject in that site until symptoms are improving    Please follow up with your neurologist    You can return here if any symptoms or worsening (fever, spreading redness, pus formation or drainage)        You must understand that you have received an Urgent Care treatment only and that you may be released before all of your medical problems are known or treated.    Interferon Beta-1a injection  What is this medicine?  INTERFERON BETA-1a (in ter FEER on [place] d[place] perez) helps to decrease the number of multiple sclerosis attacks and to slow physical disability in people with relapsing forms of the disease. The medicine does not cure multiple sclerosis.  How should I use this medicine?  Rebif is for injection under the skin. Avonex is for injection into a muscle. You will be taught how to prepare and give this medicine. Use exactly as directed. Take your medicine at regular intervals. Do not take it more often than directed.  It is important that you put your used needles and syringes in a special sharps container. Do not put them in a trash can. If you do not have a sharps container, call your pharmacist or healthcare provider to get one.  A special MedGuide will be given to you by the pharmacist with each prescription and refill. Be sure to read this information carefully each time.  Talk to your pediatrician regarding the use of this medicine in children. Special care may be needed.  The  of the medicine offers free information to patients and their health care partners. Call [phone] for more information about Rebif. Call 1-787.755.2739 for more information about Avonex.  What side effects may I notice from receiving this medicine?  Side effects that you should report to your doctor or health care professional as soon as  possible:  · a skin sore with a black-blue color, swelling, or drainage  · allergic reactions like skin rash, itching or hives, swelling of the face, lips, or tongue  · breathing problems  · depression  · seizures  · severe stomach pain  · signs and symptoms of infection like fever or chills,cough, sore throat, pain or difficulty passing urine  · swelling of the ankles  · unusual bleeding or bruising  · unusually weak or tired  · yellowing of the eyes or skin  Side effects that usually do not require medical attention (report to your doctor or health care professional if they continue or are bothersome):  · dizziness  · headache  · menstrual changes  · muscle aches  · nausea  · pain, redness, or irritation at site where injected  · tiredness  What may interact with this medicine?  · zidovudine, AZT  What if I miss a dose?  If you miss a dose, take it as soon as you can. If it is almost time for your next dose, take only that dose. Do not take double or extra doses. Do not give 2 injections within 2 days of each other. If you accidentally take a dose on 2 consecutive days, call your doctor or health care professional.  Where should I keep my medicine?  Keep out of the reach of children.  Rebif: Store in a refrigerator between 2 and 8 degrees C (36 and 46 degrees F). Do not freeze. If a refrigerator is not available, the medicine can be kept cool at or below 25 degrees C (77 degrees F) for up to 30 days. Protect from light. Throw away any unused medicine after the expiration date.  Avonex Vials: Store in a refrigerator between 2 and 8 degrees C (36 and 46 degrees F). Do not freeze. If a refrigerator is not available, the vials may be kept at room temperature at or below 25 degrees C (77 degrees F) for up to 30 days. Protect from light. Throw away any unused solution.  Avonex Pre-filled syringes: Store in a refrigerator between 2 and 8 degrees C (36 and 46 degrees F). Do not freeze. If a refrigerator is not available,  the pre-filled syringe can be stored at a temperature at or below 25 degrees C (77 degrees F) for up to 7 days. Protect from light.  Avonex Auto-Injector Pens: Store in a refrigerator between 2 and 8 degrees C (36 and 46 degrees F). Do not freeze. If a refrigerator is not available, the auto-injector pen may be stored at a temperature at or below 25 degrees C (77 degrees F) for up to 7 days. Protect from light.  What should I tell my health care provider before I take this medicine?  They need to know if you have any of these conditions:  · depression  · drink more than 3 alcohol containing drinks per day  · heart disease or irregular heart beats/rhythm  · immune system problems  · liver disease  · seizures  · thyroid disease  · an unusual or allergic reaction to interferon, hamster proteins, albumin, mannitol, or other medicines, foods, dyes, or preservatives  · pregnant or trying to get pregnant  · breast-feeding  What should I watch for while using this medicine?  Tell your doctor or healthcare professional if your symptoms do not start to get better or if they get worse.  Visit your doctor or health care professional for regular checks on your progress. You will need frequent blood checks.  Flu-like symptoms are common with the medicine. Using this medicine at night may help. Ask your doctor or health care professional about taking acetaminophen or ibuprofen before your dose and for 24 hours after you receive your injection.  Do not become pregnant while taking this medicine. Women should inform their doctor if they wish to become pregnant or think they might be pregnant. There is a potential for serious side effects to an unborn child. Talk to your health care professional or pharmacist for more information.  NOTE:This sheet is a summary. It may not cover all possible information. If you have questions about this medicine, talk to your doctor, pharmacist, or health care provider. Copyright© 2017 [employer]

## 2019-09-28 NOTE — PROGRESS NOTES
"Subjective:       Patient ID: King Perez is a 34 y.o. female.    Vitals:    09/27/19 1903   BP: 100/69   Pulse: 67   Resp: 18   Temp: 97 °F (36.1 °C)   SpO2: 98%   Weight: 65.3 kg (144 lb)   Height: 5' 5" (1.651 m)       Chief Complaint: Edema (injection site swelling and pain from REBIF( RT SIDE HIP)    Pt takes rebif injections for the past 4 years. Last injected the right hip two weeks ago, since then has had some swelling and erythema which is improved. She has been taking ibuprofen and cool compresses. No sustained fever, pus drainage.     Edema   This is a new problem. The current episode started 1 to 4 weeks ago (FROM INJECTION ON LAST WEEK ). The problem occurs constantly. The problem has been gradually worsening. Associated symptoms include joint swelling. Pertinent negatives include no abdominal pain, chest pain, neck pain, numbness or weakness. Nothing aggravates the symptoms. She has tried NSAIDs (WARM TOWELS ) for the symptoms. The treatment provided no relief.     Review of Systems   Constitution: Negative for malaise/fatigue.   HENT: Negative for nosebleeds.    Cardiovascular: Negative for chest pain and syncope.   Respiratory: Negative for shortness of breath.    Musculoskeletal: Positive for joint pain and joint swelling. Negative for back pain and neck pain.   Gastrointestinal: Negative for abdominal pain.   Genitourinary: Negative for hematuria.   Neurological: Negative for dizziness, numbness and weakness.       Objective:      Physical Exam   Constitutional: She is oriented to person, place, and time. She appears well-developed and well-nourished.   HENT:   Head: Normocephalic and atraumatic. Head is without abrasion, without contusion and without laceration.   Right Ear: External ear normal.   Left Ear: External ear normal.   Nose: Nose normal.   Mouth/Throat: Oropharynx is clear and moist.   Eyes: Pupils are equal, round, and reactive to light. Conjunctivae, EOM and lids are normal. "   Neck: Trachea normal, full passive range of motion without pain and phonation normal. Neck supple.   Cardiovascular: Normal rate, regular rhythm and normal heart sounds.   Pulmonary/Chest: Effort normal and breath sounds normal. No stridor. No respiratory distress.   Musculoskeletal: Normal range of motion.   Neurological: She is alert and oriented to person, place, and time.   Skin: Skin is warm, dry and intact. Capillary refill takes less than 2 seconds. No abrasion, no bruising, no burn, no ecchymosis, no laceration, no lesion and no rash noted. No erythema.        Psychiatric: She has a normal mood and affect. Her speech is normal and behavior is normal. Judgment and thought content normal. Cognition and memory are normal.   Nursing note and vitals reviewed.          Assessment:       1. Injection site reaction, initial encounter        Plan:       King was seen today for edema.    Diagnoses and all orders for this visit:    Injection site reaction, initial encounter    I do not see any large cellulitis or abscess formation, I think conservative management how she has been is sufficient. I do not feel pt needs abx for cellulitis at this time. Advised not to use this area for now for injections. F/u with neurology. Can return here if sx worsening.    Patient Instructions   PLEASE READ YOUR DISCHARGE INSTRUCTIONS ENTIRELY AS IT CONTAINS IMPORTANT INFORMATION.    Continue cool compresses on the area    Ibuprofen as you have been taking it    Do not inject in that site until symptoms are improving    Please follow up with your neurologist    You can return here if any symptoms or worsening (fever, spreading redness, pus formation or drainage)        You must understand that you have received an Urgent Care treatment only and that you may be released before all of your medical problems are known or treated.    Interferon Beta-1a injection  What is this medicine?  INTERFERON BETA-1a (in ter [place] on [place])  helps to decrease the number of multiple sclerosis attacks and to slow physical disability in people with relapsing forms of the disease. The medicine does not cure multiple sclerosis.  How should I use this medicine?  Rebif is for injection under the skin. Avonex is for injection into a muscle. You will be taught how to prepare and give this medicine. Use exactly as directed. Take your medicine at regular intervals. Do not take it more often than directed.  It is important that you put your used needles and syringes in a special sharps container. Do not put them in a trash can. If you do not have a sharps container, call your pharmacist or healthcare provider to get one.  A special MedGuide will be given to you by the pharmacist with each prescription and refill. Be sure to read this information carefully each time.  Talk to your pediatrician regarding the use of this medicine in children. Special care may be needed.  The  of the medicine offers free information to patients and their health care partners. Call [phone] for more information about Rebif. Call 1-796.247.4741 for more information about Avonex.  What side effects may I notice from receiving this medicine?  Side effects that you should report to your doctor or health care professional as soon as possible:  · a skin sore with a black-blue color, swelling, or drainage  · allergic reactions like skin rash, itching or hives, swelling of the face, lips, or tongue  · breathing problems  · depression  · seizures  · severe stomach pain  · signs and symptoms of infection like fever or chills,cough, sore throat, pain or difficulty passing urine  · swelling of the ankles  · unusual bleeding or bruising  · unusually weak or tired  · yellowing of the eyes or skin  Side effects that usually do not require medical attention (report to your doctor or health care professional if they continue or are bothersome):  · dizziness  · headache  · menstrual  changes  · muscle aches  · nausea  · pain, redness, or irritation at site where injected  · tiredness  What may interact with this medicine?  · zidovudine, AZT  What if I miss a dose?  If you miss a dose, take it as soon as you can. If it is almost time for your next dose, take only that dose. Do not take double or extra doses. Do not give 2 injections within 2 days of each other. If you accidentally take a dose on 2 consecutive days, call your doctor or health care professional.  Where should I keep my medicine?  Keep out of the reach of children.  Rebif: Store in a refrigerator between 2 and 8 degrees C (36 and 46 degrees F). Do not freeze. If a refrigerator is not available, the medicine can be kept cool at or below 25 degrees C (77 degrees F) for up to 30 days. Protect from light. Throw away any unused medicine after the expiration date.  Avonex Vials: Store in a refrigerator between 2 and 8 degrees C (36 and 46 degrees F). Do not freeze. If a refrigerator is not available, the vials may be kept at room temperature at or below 25 degrees C (77 degrees F) for up to 30 days. Protect from light. Throw away any unused solution.  Avonex Pre-filled syringes: Store in a refrigerator between 2 and 8 degrees C (36 and 46 degrees F). Do not freeze. If a refrigerator is not available, the pre-filled syringe can be stored at a temperature at or below 25 degrees C (77 degrees F) for up to 7 days. Protect from light.  Avonex Auto-Injector Pens: Store in a refrigerator between 2 and 8 degrees C (36 and 46 degrees F). Do not freeze. If a refrigerator is not available, the auto-injector pen may be stored at a temperature at or below 25 degrees C (77 degrees F) for up to 7 days. Protect from light.  What should I tell my health care provider before I take this medicine?  They need to know if you have any of these conditions:  · depression  · drink more than 3 alcohol containing drinks per day  · heart disease or irregular heart  beats/rhythm  · immune system problems  · liver disease  · seizures  · thyroid disease  · an unusual or allergic reaction to interferon, hamster proteins, albumin, mannitol, or other medicines, foods, dyes, or preservatives  · pregnant or trying to get pregnant  · breast-feeding  What should I watch for while using this medicine?  Tell your doctor or healthcare professional if your symptoms do not start to get better or if they get worse.  Visit your doctor or health care professional for regular checks on your progress. You will need frequent blood checks.  Flu-like symptoms are common with the medicine. Using this medicine at night may help. Ask your doctor or health care professional about taking acetaminophen or ibuprofen before your dose and for 24 hours after you receive your injection.  Do not become pregnant while taking this medicine. Women should inform their doctor if they wish to become pregnant or think they might be pregnant. There is a potential for serious side effects to an unborn child. Talk to your health care professional or pharmacist for more information.  NOTE:This sheet is a summary. It may not cover all possible information. If you have questions about this medicine, talk to your doctor, pharmacist, or health care provider. Copyright© 2017 [employer]

## 2019-10-21 ENCOUNTER — OFFICE VISIT (OUTPATIENT)
Dept: OBSTETRICS AND GYNECOLOGY | Facility: CLINIC | Age: 34
End: 2019-10-21
Payer: COMMERCIAL

## 2019-10-21 VITALS
SYSTOLIC BLOOD PRESSURE: 110 MMHG | HEIGHT: 65 IN | BODY MASS INDEX: 24.06 KG/M2 | DIASTOLIC BLOOD PRESSURE: 70 MMHG | WEIGHT: 144.38 LBS

## 2019-10-21 DIAGNOSIS — N64.4 BREAST PAIN, LEFT: Primary | ICD-10-CM

## 2019-10-21 PROCEDURE — 99213 OFFICE O/P EST LOW 20 MIN: CPT | Mod: S$GLB,,, | Performed by: NURSE PRACTITIONER

## 2019-10-21 PROCEDURE — 99213 PR OFFICE/OUTPT VISIT, EST, LEVL III, 20-29 MIN: ICD-10-PCS | Mod: S$GLB,,, | Performed by: NURSE PRACTITIONER

## 2019-10-21 PROCEDURE — 3008F BODY MASS INDEX DOCD: CPT | Mod: CPTII,S$GLB,, | Performed by: NURSE PRACTITIONER

## 2019-10-21 PROCEDURE — 99999 PR PBB SHADOW E&M-EST. PATIENT-LVL III: CPT | Mod: PBBFAC,,, | Performed by: NURSE PRACTITIONER

## 2019-10-21 PROCEDURE — 3008F PR BODY MASS INDEX (BMI) DOCUMENTED: ICD-10-PCS | Mod: CPTII,S$GLB,, | Performed by: NURSE PRACTITIONER

## 2019-10-21 PROCEDURE — 99999 PR PBB SHADOW E&M-EST. PATIENT-LVL III: ICD-10-PCS | Mod: PBBFAC,,, | Performed by: NURSE PRACTITIONER

## 2019-10-21 NOTE — PROGRESS NOTES
"  CC: breast pain and "white spot" on nipple    HPI: Pt is a 34 y.o.  female who presents for unilateral breast pain. Reports pain is more of a discomfort that started 2 weeks ago. It has been unchanged since. Pain is in left breast only. No lumps or skin changes. She has noticed a "white bump" on her left nipple. Denies any new medications or trauma to the chest. She is a runner and tries to wear a well fitting sports bra, questioning if white bump is just irritation from her bra. PGM had breast cancer in her 60s.     ROS:  GENERAL: Feeling well overall. Denies fever or chills.   SKIN: Denies rash or lesions.   HEAD: Denies head injury or headache.   NODES: Denies enlarged lymph nodes.   CHEST: Denies chest pain or shortness of breath.   CARDIOVASCULAR: Denies palpitations or left sided chest pain.   ABDOMEN: No abdominal pain, constipation, diarrhea, nausea, vomiting or rectal bleeding.   URINARY: No dysuria, hematuria, or burning on urination.  REPRODUCTIVE: See HPI.   BREASTS: (+) Left breast pain; no lumps, or nipple discharge.   HEMATOLOGIC: No easy bruisability or excessive bleeding.   MUSCULOSKELETAL: Denies joint pain or swelling.   NEUROLOGIC: Denies syncope or weakness.   PSYCHIATRIC: Denies depression, anxiety or mood swings.    PE:   APPEARANCE: Well nourished, well developed, White female in no acute distress.  BREASTS: symmetrical, no nipple discharge or lumps.   PELVIC: Deferred, breast exam only    Diagnosis:  1. Breast pain, left        Plan:     Orders Placed This Encounter    US Breast Left Limited    Mammo Digital Screening Left     Total duration face to face visit time 15 minutes.     Follow-up pending results  "

## 2019-11-04 ENCOUNTER — TELEPHONE (OUTPATIENT)
Dept: PHARMACY | Facility: CLINIC | Age: 34
End: 2019-11-04

## 2019-11-04 ENCOUNTER — TELEPHONE (OUTPATIENT)
Dept: OBSTETRICS AND GYNECOLOGY | Facility: CLINIC | Age: 34
End: 2019-11-04

## 2019-11-04 NOTE — TELEPHONE ENCOUNTER
----- Message from Viviane Martinez NP sent at 11/4/2019  8:03 AM CST -----  Please contact pt. I had to change her [facility] orders to a bilateral diagnostic and U/s, it needs to be done at [facility] per radiology. Please reschedule pt for both.

## 2019-11-14 ENCOUNTER — TELEPHONE (OUTPATIENT)
Dept: PHARMACY | Facility: CLINIC | Age: 34
End: 2019-11-14

## 2019-11-14 NOTE — TELEPHONE ENCOUNTER
Call Attempt #4 LVM for Rebif refill.   [email or web address] message read 11/4.  Postcard Sent.  InBasket sent to [facility].

## 2019-11-17 ENCOUNTER — PATIENT MESSAGE (OUTPATIENT)
Dept: NEUROLOGY | Facility: CLINIC | Age: 34
End: 2019-11-17

## 2019-11-17 DIAGNOSIS — G35 MS (MULTIPLE SCLEROSIS): ICD-10-CM

## 2019-11-18 DIAGNOSIS — G35 MULTIPLE SCLEROSIS: ICD-10-CM

## 2019-11-18 NOTE — TELEPHONE ENCOUNTER
Spoke with pt in regards to having her labs drawn before filling her Rebif; pt scheduled for labs on tomorrow, at 430PM.

## 2019-11-20 ENCOUNTER — LAB VISIT (OUTPATIENT)
Dept: LAB | Facility: HOSPITAL | Age: 34
End: 2019-11-20
Attending: CLINICAL NURSE SPECIALIST
Payer: COMMERCIAL

## 2019-11-20 DIAGNOSIS — G35 MULTIPLE SCLEROSIS: ICD-10-CM

## 2019-11-20 LAB
25(OH)D3+25(OH)D2 SERPL-MCNC: 58 NG/ML (ref 30–96)
ALBUMIN SERPL BCP-MCNC: 3.6 G/DL (ref 3.5–5.2)
ALP SERPL-CCNC: 40 U/L (ref 55–135)
ALT SERPL W/O P-5'-P-CCNC: 30 U/L (ref 10–44)
AST SERPL-CCNC: 27 U/L (ref 10–40)
BASOPHILS # BLD AUTO: 0.02 K/UL (ref 0–0.2)
BASOPHILS NFR BLD: 0.4 % (ref 0–1.9)
BILIRUB DIRECT SERPL-MCNC: 0.2 MG/DL (ref 0.1–0.3)
BILIRUB SERPL-MCNC: 0.4 MG/DL (ref 0.1–1)
DIFFERENTIAL METHOD: ABNORMAL
EOSINOPHIL # BLD AUTO: 0.1 K/UL (ref 0–0.5)
EOSINOPHIL NFR BLD: 1.8 % (ref 0–8)
ERYTHROCYTE [DISTWIDTH] IN BLOOD BY AUTOMATED COUNT: 11.9 % (ref 11.5–14.5)
HCT VFR BLD AUTO: 36 % (ref 37–48.5)
HGB BLD-MCNC: 11.7 G/DL (ref 12–16)
IMM GRANULOCYTES # BLD AUTO: 0.01 K/UL (ref 0–0.04)
IMM GRANULOCYTES NFR BLD AUTO: 0.2 % (ref 0–0.5)
LYMPHOCYTES # BLD AUTO: 2 K/UL (ref 1–4.8)
LYMPHOCYTES NFR BLD: 44.3 % (ref 18–48)
MCH RBC QN AUTO: 33.2 PG (ref 27–31)
MCHC RBC AUTO-ENTMCNC: 32.5 G/DL (ref 32–36)
MCV RBC AUTO: 102 FL (ref 82–98)
MONOCYTES # BLD AUTO: 0.4 K/UL (ref 0.3–1)
MONOCYTES NFR BLD: 8 % (ref 4–15)
NEUTROPHILS # BLD AUTO: 2 K/UL (ref 1.8–7.7)
NEUTROPHILS NFR BLD: 45.3 % (ref 38–73)
NRBC BLD-RTO: 0 /100 WBC
PLATELET # BLD AUTO: 118 K/UL (ref 150–350)
PMV BLD AUTO: 11.1 FL (ref 9.2–12.9)
PROT SERPL-MCNC: 7 G/DL (ref 6–8.4)
RBC # BLD AUTO: 3.52 M/UL (ref 4–5.4)
WBC # BLD AUTO: 4.51 K/UL (ref 3.9–12.7)

## 2019-11-20 PROCEDURE — 82306 VITAMIN D 25 HYDROXY: CPT

## 2019-11-20 PROCEDURE — 85025 COMPLETE CBC W/AUTO DIFF WBC: CPT

## 2019-11-20 PROCEDURE — 80076 HEPATIC FUNCTION PANEL: CPT

## 2019-11-20 PROCEDURE — 36415 COLL VENOUS BLD VENIPUNCTURE: CPT | Mod: PO

## 2019-11-21 ENCOUNTER — TELEPHONE (OUTPATIENT)
Dept: NEUROLOGY | Facility: CLINIC | Age: 34
End: 2019-11-21

## 2019-11-21 ENCOUNTER — PATIENT MESSAGE (OUTPATIENT)
Dept: NEUROLOGY | Facility: CLINIC | Age: 34
End: 2019-11-21

## 2019-11-21 DIAGNOSIS — F41.9 ANXIETY: Primary | ICD-10-CM

## 2019-11-21 RX ORDER — DIAZEPAM 5 MG/1
TABLET ORAL
Qty: 2 TABLET | Refills: 0 | Status: SHIPPED | OUTPATIENT
Start: 2019-11-21 | End: 2020-04-07

## 2019-11-25 ENCOUNTER — PATIENT MESSAGE (OUTPATIENT)
Dept: NEUROLOGY | Facility: CLINIC | Age: 34
End: 2019-11-25

## 2019-11-25 ENCOUNTER — TELEPHONE (OUTPATIENT)
Dept: NEUROLOGY | Facility: CLINIC | Age: 34
End: 2019-11-25

## 2019-11-25 ENCOUNTER — HOSPITAL ENCOUNTER (OUTPATIENT)
Dept: RADIOLOGY | Facility: HOSPITAL | Age: 34
Discharge: HOME | End: 2019-11-25
Attending: CLINICAL NURSE SPECIALIST
Payer: COMMERCIAL

## 2019-11-25 DIAGNOSIS — G35 MULTIPLE SCLEROSIS: ICD-10-CM

## 2019-11-25 DIAGNOSIS — G35 MULTIPLE SCLEROSIS: Primary | ICD-10-CM

## 2019-11-25 PROCEDURE — A9585 GADOBUTROL INJECTION: HCPCS | Performed by: CLINICAL NURSE SPECIALIST

## 2019-11-25 PROCEDURE — 70553 MRI BRAIN DEMYELINATING W/ WO CONTRAST: ICD-10-PCS | Mod: 26,,, | Performed by: RADIOLOGY

## 2019-11-25 PROCEDURE — 25500020 PHARM REV CODE 255: Performed by: CLINICAL NURSE SPECIALIST

## 2019-11-25 PROCEDURE — 70553 MRI BRAIN STEM W/O & W/DYE: CPT | Mod: 26,,, | Performed by: RADIOLOGY

## 2019-11-25 PROCEDURE — 70553 MRI BRAIN STEM W/O & W/DYE: CPT | Mod: TC

## 2019-11-25 RX ORDER — GADOBUTROL 604.72 MG/ML
7 INJECTION INTRAVENOUS
Status: COMPLETED | OUTPATIENT
Start: 2019-11-25 | End: 2019-11-25

## 2019-11-25 RX ADMIN — GADOBUTROL 7 ML: 604.72 INJECTION INTRAVENOUS at 11:11

## 2019-11-26 ENCOUNTER — PATIENT MESSAGE (OUTPATIENT)
Dept: NEUROLOGY | Facility: CLINIC | Age: 34
End: 2019-11-26

## 2019-11-26 DIAGNOSIS — G35 MS (MULTIPLE SCLEROSIS): Primary | ICD-10-CM

## 2019-11-27 ENCOUNTER — PATIENT MESSAGE (OUTPATIENT)
Dept: NEUROLOGY | Facility: CLINIC | Age: 34
End: 2019-11-27

## 2019-11-29 ENCOUNTER — LAB VISIT (OUTPATIENT)
Dept: LAB | Facility: HOSPITAL | Age: 34
End: 2019-11-29
Attending: CLINICAL NURSE SPECIALIST
Payer: COMMERCIAL

## 2019-11-29 ENCOUNTER — PATIENT MESSAGE (OUTPATIENT)
Dept: NEUROLOGY | Facility: CLINIC | Age: 34
End: 2019-11-29

## 2019-11-29 DIAGNOSIS — G35 MULTIPLE SCLEROSIS: ICD-10-CM

## 2019-11-29 PROCEDURE — 36415 COLL VENOUS BLD VENIPUNCTURE: CPT | Mod: PO

## 2019-11-29 PROCEDURE — 86382 NEUTRALIZATION TEST VIRAL: CPT

## 2019-12-05 RX ORDER — DIAZEPAM 5 MG/1
TABLET ORAL
Qty: 2 TABLET | Refills: 0 | OUTPATIENT
Start: 2019-12-05

## 2019-12-11 ENCOUNTER — PATIENT MESSAGE (OUTPATIENT)
Dept: NEUROLOGY | Facility: CLINIC | Age: 34
End: 2019-12-11

## 2019-12-19 ENCOUNTER — TELEPHONE (OUTPATIENT)
Dept: PHARMACY | Facility: CLINIC | Age: 34
End: 2019-12-19

## 2019-12-31 ENCOUNTER — OFFICE VISIT (OUTPATIENT)
Dept: NEUROLOGY | Facility: CLINIC | Age: 34
End: 2019-12-31
Payer: COMMERCIAL

## 2019-12-31 VITALS
HEART RATE: 75 BPM | DIASTOLIC BLOOD PRESSURE: 48 MMHG | SYSTOLIC BLOOD PRESSURE: 121 MMHG | BODY MASS INDEX: 24.17 KG/M2 | WEIGHT: 145.06 LBS | HEIGHT: 65 IN

## 2019-12-31 DIAGNOSIS — G35 MS (MULTIPLE SCLEROSIS): Primary | ICD-10-CM

## 2019-12-31 DIAGNOSIS — R90.89 ABNORMAL FINDING ON MRI OF BRAIN: ICD-10-CM

## 2019-12-31 DIAGNOSIS — Z71.89 COUNSELING REGARDING GOALS OF CARE: ICD-10-CM

## 2019-12-31 DIAGNOSIS — Z29.89 PROPHYLACTIC IMMUNOTHERAPY: ICD-10-CM

## 2019-12-31 LAB — NABFERON ANTIBODY TEST: NORMAL

## 2019-12-31 PROCEDURE — 99215 OFFICE O/P EST HI 40 MIN: CPT | Mod: S$GLB,,, | Performed by: PSYCHIATRY & NEUROLOGY

## 2019-12-31 PROCEDURE — 3008F PR BODY MASS INDEX (BMI) DOCUMENTED: ICD-10-PCS | Mod: CPTII,S$GLB,, | Performed by: PSYCHIATRY & NEUROLOGY

## 2019-12-31 PROCEDURE — 3008F BODY MASS INDEX DOCD: CPT | Mod: CPTII,S$GLB,, | Performed by: PSYCHIATRY & NEUROLOGY

## 2019-12-31 PROCEDURE — 99999 PR PBB SHADOW E&M-EST. PATIENT-LVL III: ICD-10-PCS | Mod: PBBFAC,,, | Performed by: PSYCHIATRY & NEUROLOGY

## 2019-12-31 PROCEDURE — 99215 PR OFFICE/OUTPT VISIT, EST, LEVL V, 40-54 MIN: ICD-10-PCS | Mod: S$GLB,,, | Performed by: PSYCHIATRY & NEUROLOGY

## 2019-12-31 PROCEDURE — 99999 PR PBB SHADOW E&M-EST. PATIENT-LVL III: CPT | Mod: PBBFAC,,, | Performed by: PSYCHIATRY & NEUROLOGY

## 2019-12-31 NOTE — PROGRESS NOTES
Subjective:        Patient ID: King Perez is a 34 y.o. female who presents today for a routine clinic visit for MS.      MS HPI:  · DMT: interferon beta-1a SQ  · Side effects from DMT? Yes   · Taking vitamin D3 as recommended? Yes -  5,000 M-F, 10,000 S,S  · Not actively trying to conceive currently--she and  are ambivalent about this   · MRI in November with one active lesions;   · She does have some short term memory issues at times;    · No overall sense of decline;     Medications:  Current Outpatient Medications   Medication Sig    B complex-C-E-Zn Tab Take 1 tablet by mouth once daily.    interferon beta-1a, albumin, (REBIF REBIDOSE) 44 mcg/0.5 mL PnIj Inject 44 mcg into the skin 3 (three) times a week.    valACYclovir (VALTREX) 1000 MG tablet Take 1 tablet (1,000 mg total) by mouth 2 (two) times daily as needed.    vitamin D 1000 units Tab Take 5,000 Units by mouth once daily.     diazePAM (VALIUM) 5 MG tablet Take 1 tablet 1 hour prior to MRI and 1 tablet at the time of the MRI.    LORYNA, 28, 3-0.02 mg per tablet Take 1 tablet by mouth once daily. (Patient not taking: Reported on 12/31/2019)    TURMERIC ROOT EXTRACT ORAL Take by mouth.     No current facility-administered medications for this visit.        SOCIAL HISTORY  Social History     Tobacco Use    Smoking status: Never Smoker    Smokeless tobacco: Never Used   Substance Use Topics    Alcohol use: Yes     Comment: socially    Drug use: No       Living arrangements - the patient lives with their spouse.    MS ROS:    No flowsheet data found.           Objective:        Neurologic Exam  MENTAL STATUS: grossly intact  CRANIAL NERVE EXAM: There is no internuclear ophthalmoplegia. Extraocular   muscles are intact.  No facial   asymmetry.There is no dysarthria. Color vision 18/18 each eye; 20/20 vision OD and OS  MOTOR EXAM: Normal bulk and tone throughout UE and LE bilaterally. Rapid sequential movements are normal. Strength  is 5/5 in all groups   in the lower extremities and upper extremities.   REFLEXES: Symmetric and 2+ throughout in all 4 extremities.   SENSORY EXAM: Normal to vibration t/o  COORDINATION: Normal finger-to-nose exam.   GAIT: Narrow based and stable.      Imaging:       Results for orders placed during the hospital encounter of 11/25/19   MRI Brain Demyelinating W W/O Contrast    Impression 1. In this patient with a reported history of multiple sclerosis, there is a new lesion within the subcortical white matter of the left temporal lobe with associated punctate focus of enhancement concerning for active demyelination.  This report was flagged in Epic as abnormal.    Electronically signed by resident: Barron Johnson  Date:    11/25/2019  Time:    11:26    Electronically signed by: Chris Shaffer MD  Date:    11/25/2019  Time:    15:51     Results for orders placed during the hospital encounter of 11/26/18   MRI Cervical Spine Demyelinating W W/O Contrast    Impression Continued multiple short segment foci of T2 stir signal hyperintensities within the cervical and thoracic cord in light of history concerning for prior areas of demyelination.    No evidence for definite new cord lesion or abnormal intrathecal enhancement to suggest significant interval or active demyelination.    Please see above for additional details      Electronically signed by: Dennis Palmer DO  Date:    11/26/2018  Time:    16:23     Results for orders placed during the hospital encounter of 11/26/18   MRI Thoracic Spine Demyelinating W W/O Contrast    Impression Continued multiple short segment foci of T2 stir signal hyperintensities within the cervical and thoracic cord in light of history concerning for prior areas of demyelination.    No evidence for definite new cord lesion or abnormal intrathecal enhancement to suggest significant interval or active demyelination.    Please see above for additional details      Electronically signed by: Dennis  DO Spencer  Date:    11/26/2018  Time:    16:23         Labs:     Lab Results   Component Value Date    [id number] 58 11/20/2019    ICFWJWDV24OS 84 04/30/2019    LJKMATWK27PD 50 11/12/2018     No results found for: JCVINDEX, JCVANTIBODY  Lab Results   Component Value Date    KL8QWDTH 75.0 07/03/2017    ABSOLUTECD3 1736 07/03/2017    WB2USWBE 22.8 07/03/2017    ABSOLUTECD8 528 07/03/2017    VX8KRPKZ 52.5 07/03/2017    ABSOLUTECD4 1214 07/03/2017    LABCD48 2.30 07/03/2017     Lab Results   Component Value Date    WBC 4.51 11/20/2019    RBC 3.52 (L) 11/20/2019    HGB 11.7 (L) 11/20/2019    HCT 36.0 (L) 11/20/2019     (H) 11/20/2019    MCH 33.2 (H) 11/20/2019    MCHC 32.5 11/20/2019    RDW 11.9 11/20/2019     (L) 11/20/2019    MPV 11.1 11/20/2019    GRAN 2.0 11/20/2019    GRAN 45.3 11/20/2019    LYMPH 2.0 11/20/2019    LYMPH 44.3 11/20/2019    MONO 0.4 11/20/2019    MONO 8.0 11/20/2019    EOS 0.1 11/20/2019    BASO 0.02 11/20/2019    EOSINOPHIL 1.8 11/20/2019    BASOPHIL 0.4 11/20/2019     Sodium   Date Value Ref Range Status   03/21/2019 137 136 - 145 mmol/L Final     Potassium   Date Value Ref Range Status   03/21/2019 4.0 3.5 - 5.1 mmol/L Final     Chloride   Date Value Ref Range Status   03/21/2019 105 95 - 110 mmol/L Final     CO2   Date Value Ref Range Status   03/21/2019 24 23 - 29 mmol/L Final     Glucose   Date Value Ref Range Status   03/21/2019 92 70 - 110 mg/dL Final     BUN, Bld   Date Value Ref Range Status   03/21/2019 13 6 - 20 mg/dL Final     Creatinine   Date Value Ref Range Status   03/21/2019 0.7 0.5 - 1.4 mg/dL Final     Calcium   Date Value Ref Range Status   03/21/2019 9.0 8.7 - 10.5 mg/dL Final     Total Protein   Date Value Ref Range Status   11/20/2019 7.0 6.0 - 8.4 g/dL Final     Albumin   Date Value Ref Range Status   11/20/2019 3.6 3.5 - 5.2 g/dL Final     Total Bilirubin   Date Value Ref Range Status   11/20/2019 0.4 0.1 - 1.0 mg/dL Final     Comment:     For infants and  newborns, interpretation of results should be based  on gestational age, weight and in agreement with clinical  observations.  Premature Infant recommended reference ranges:  Up to 24 hours.............<8.0 mg/dL  Up to 48 hours............<12.0 mg/dL  3-5 days..................<15.0 mg/dL  6-29 days.................<15.0 mg/dL       Alkaline Phosphatase   Date Value Ref Range Status   11/20/2019 40 (L) 55 - 135 U/L Final     AST   Date Value Ref Range Status   11/20/2019 27 10 - 40 U/L Final     ALT   Date Value Ref Range Status   11/20/2019 30 10 - 44 U/L Final     Anion Gap   Date Value Ref Range Status   03/21/2019 8 8 - 16 mmol/L Final     eGFR if    Date Value Ref Range Status   03/21/2019 >60 >60 mL/min/1.73 m^2 Final     eGFR if non    Date Value Ref Range Status   03/21/2019 >60 >60 mL/min/1.73 m^2 Final     Comment:     Calculation used to obtain the estimated glomerular filtration  rate (eGFR) is the CKD-EPI equation.         No results found for: HEPBSAG, HEPBSAB, HEPBCAB      Impression:       Labs reviewed: Yes  Imaging tests reviewed: Yes    Diagnosis of MS: Yes      MS Diagnosis based on:    Progression:  Number of relapses in the past year:  0  Clinical Progression:  Clinically Stable  MRI Progression:  Worsened (new lesions)    Plan:   Imaging labs ordered?:  No    Additional Impression:            Had detailed discussion about changing DMT vs staying the course;  All said I'd favor changing DMT. We discussed Ocrevus at length, but she was not ready to make a decision.  We might consider Tecfidera if she does not want to do infusions. Can consider staying the course and optimizing vit D, but not my first choice. If she does decide to conceive soon, then I would favor staying the course with Rebif.  Must f/u on NAB result as well; f/u 1 mo        Over 50% of this 40 minute visit was spent in direct face to face counseling of the patient about MS, DMT considerations,  and MS symptom management.     Problem List Items Addressed This Visit     None          Marci Hernandez MD

## 2020-01-22 ENCOUNTER — TELEPHONE (OUTPATIENT)
Dept: PHARMACY | Facility: CLINIC | Age: 35
End: 2020-01-22

## 2020-01-24 ENCOUNTER — PATIENT MESSAGE (OUTPATIENT)
Dept: NEUROLOGY | Facility: CLINIC | Age: 35
End: 2020-01-24

## 2020-01-27 ENCOUNTER — OFFICE VISIT (OUTPATIENT)
Dept: NEUROLOGY | Facility: CLINIC | Age: 35
End: 2020-01-27
Payer: COMMERCIAL

## 2020-01-27 VITALS
HEART RATE: 58 BPM | HEIGHT: 65 IN | SYSTOLIC BLOOD PRESSURE: 102 MMHG | BODY MASS INDEX: 23.64 KG/M2 | WEIGHT: 141.88 LBS | DIASTOLIC BLOOD PRESSURE: 63 MMHG

## 2020-01-27 DIAGNOSIS — G35 MULTIPLE SCLEROSIS: Primary | ICD-10-CM

## 2020-01-27 DIAGNOSIS — Z79.899 HIGH RISK MEDICATION USE: ICD-10-CM

## 2020-01-27 DIAGNOSIS — Z29.89 PROPHYLACTIC IMMUNOTHERAPY: ICD-10-CM

## 2020-01-27 DIAGNOSIS — Z71.89 COUNSELING REGARDING GOALS OF CARE: ICD-10-CM

## 2020-01-27 PROCEDURE — 3008F PR BODY MASS INDEX (BMI) DOCUMENTED: ICD-10-PCS | Mod: CPTII,S$GLB,, | Performed by: CLINICAL NURSE SPECIALIST

## 2020-01-27 PROCEDURE — 99999 PR PBB SHADOW E&M-EST. PATIENT-LVL III: CPT | Mod: PBBFAC,,, | Performed by: CLINICAL NURSE SPECIALIST

## 2020-01-27 PROCEDURE — 3008F BODY MASS INDEX DOCD: CPT | Mod: CPTII,S$GLB,, | Performed by: CLINICAL NURSE SPECIALIST

## 2020-01-27 PROCEDURE — 99999 PR PBB SHADOW E&M-EST. PATIENT-LVL III: ICD-10-PCS | Mod: PBBFAC,,, | Performed by: CLINICAL NURSE SPECIALIST

## 2020-01-27 PROCEDURE — 99214 OFFICE O/P EST MOD 30 MIN: CPT | Mod: S$GLB,,, | Performed by: CLINICAL NURSE SPECIALIST

## 2020-01-27 PROCEDURE — 99214 PR OFFICE/OUTPT VISIT, EST, LEVL IV, 30-39 MIN: ICD-10-PCS | Mod: S$GLB,,, | Performed by: CLINICAL NURSE SPECIALIST

## 2020-01-27 NOTE — PROGRESS NOTES
Subjective:      ·  Patient ID: King Perez is a 34 y.o. female who presents today for a routine clinic visit for MS.  She was last seen by Dr. Hernandez in late December. The history has been provided by the patient.     MS HPI:  · DMT: Rebif 44mcg SC three times a week   · Side effects from DMT? No  · Taking vitamin D3 as recommended? Yes   NABferon negative   She has had some ongoing flu-like reactions on the days after Rebif. She wakes up with sweats, feels foggy. These typically last until mid-morning the next day. She is taking all measures to help with side effects, but continues to experience these side effects.   Planning to conceive is on the backburner for now. She is interested in discussing alternative disease modifying therapies, in light of recent active lesion on MRI.   She is planning to travel to [place], [place], and [place] in the fall.     SOCIAL HISTORY  Social History     Tobacco Use    Smoking status: Never Smoker    Smokeless tobacco: Never Used   Substance Use Topics    Alcohol use: Yes     Comment: socially    Drug use: No     Living arrangements - the patient lives with her    Employment: hairdresser and salon owner         Objective:        Neurologic Exam      Neuro exam deferred today     Imaging:       Results for orders placed during the hospital encounter of 11/25/19   MRI Brain Demyelinating W W/O Contrast    Impression 1. In this patient with a reported history of multiple sclerosis, there is a new lesion within the subcortical white matter of the left temporal lobe with associated punctate focus of enhancement concerning for active demyelination.  This report was flagged in Epic as abnormal.    Electronically signed by resident: Barron Johnson  Date:    11/25/2019  Time:    11:26    Electronically signed by: Chris Shaffer MD  Date:    11/25/2019  Time:    15:51     Labs:     Lab Results   Component Value Date    ITJEFVVE34ZL 58 11/20/2019    DEYJILMZ51QU 84  04/30/2019    JOBOLMYE54RW 50 11/12/2018     Lab Results   Component Value Date    WBC 4.51 11/20/2019    RBC 3.52 (L) 11/20/2019    HGB 11.7 (L) 11/20/2019    HCT 36.0 (L) 11/20/2019     (H) 11/20/2019    MCH 33.2 (H) 11/20/2019    MCHC 32.5 11/20/2019    RDW 11.9 11/20/2019     (L) 11/20/2019    MPV 11.1 11/20/2019    GRAN 2.0 11/20/2019    GRAN 45.3 11/20/2019    LYMPH 2.0 11/20/2019    LYMPH 44.3 11/20/2019    MONO 0.4 11/20/2019    MONO 8.0 11/20/2019    EOS 0.1 11/20/2019    BASO 0.02 11/20/2019    EOSINOPHIL 1.8 11/20/2019    BASOPHIL 0.4 11/20/2019     Sodium   Date Value Ref Range Status   03/21/2019 137 136 - 145 mmol/L Final     Potassium   Date Value Ref Range Status   03/21/2019 4.0 3.5 - 5.1 mmol/L Final     Chloride   Date Value Ref Range Status   03/21/2019 105 95 - 110 mmol/L Final     CO2   Date Value Ref Range Status   03/21/2019 24 23 - 29 mmol/L Final     Glucose   Date Value Ref Range Status   03/21/2019 92 70 - 110 mg/dL Final     BUN, Bld   Date Value Ref Range Status   03/21/2019 13 6 - 20 mg/dL Final     Creatinine   Date Value Ref Range Status   03/21/2019 0.7 0.5 - 1.4 mg/dL Final     Calcium   Date Value Ref Range Status   03/21/2019 9.0 8.7 - 10.5 mg/dL Final     Total Protein   Date Value Ref Range Status   11/20/2019 7.0 6.0 - 8.4 g/dL Final     Albumin   Date Value Ref Range Status   11/20/2019 3.6 3.5 - 5.2 g/dL Final     Total Bilirubin   Date Value Ref Range Status   11/20/2019 0.4 0.1 - 1.0 mg/dL Final     Comment:     For infants and newborns, interpretation of results should be based  on gestational age, weight and in agreement with clinical  observations.  Premature Infant recommended reference ranges:  Up to 24 hours.............<8.0 mg/dL  Up to 48 hours............<12.0 mg/dL  3-5 days..................<15.0 mg/dL  6-29 days.................<15.0 mg/dL       Alkaline Phosphatase   Date Value Ref Range Status   11/20/2019 40 (L) 55 - 135 U/L Final     AST    Date Value Ref Range Status   11/20/2019 27 10 - 40 U/L Final     ALT   Date Value Ref Range Status   11/20/2019 30 10 - 44 U/L Final     Anion Gap   Date Value Ref Range Status   03/21/2019 8 8 - 16 mmol/L Final     eGFR if    Date Value Ref Range Status   03/21/2019 >60 >60 mL/min/1.73 m^2 Final     eGFR if non    Date Value Ref Range Status   03/21/2019 >60 >60 mL/min/1.73 m^2 Final     Comment:     Calculation used to obtain the estimated glomerular filtration  rate (eGFR) is the CKD-EPI equation.        Diagnosis/Assessment/Plan:    1. Multiple Sclerosis  · Assessment/DMT: In light of active lesion on recent brain MRI, the patient and I have mutually decided to switch DMT. We discussed Tecfidera and Ocrevus, but she would ultimately like to proceed with Ocrevus. The patient was counseled about the risks associated with immune suppressive therapy, including a higher risk of serious infections (URI, UTI, PML) and malignancy (breast), as well as the importance of avoiding all live virus vaccines while on immune suppressive medication. We discussed referral to ID for vaccine consult in light of immunosuppression. She will be traveling to [place] later this year, and she will discuss any required vaccines with ID. We discussed side effects of infusion reactions (itching, rash, swelling or itchiness in the throat) and administration of pre-meds to lessen this response. We also discussed other possible risks of side effects of an allergic reaction, constipation/diarrhea, nausea/vomiting, local damage at the IV site, sores on the feet, blood in the urine, and peripheral edema. The patient verbalized understanding of risk and signed consent form. We will check pre-immunosuppression labs within the next few weeks. Once lab results are received, I will submit Ocrevus paperwork. In the meantime, she will continue Rebif.   · Imaging: MRI brain 6 months after Ocrevus start     2. MS Symptom  Assessment / Management  · No change to symptom management plan.        Our visit today lasted 35 minutes, and 100% of this time was spent face to face with the patient. Over 50% of this visit included discussion of the treatment plan/medication changes/imaging results/coordination of care. The patient agrees with the plan of care. I will see her back in 3 months.     Margie Fierro, [facility]-BC, MSCN          Problem List Items Addressed This Visit     None

## 2020-01-27 NOTE — Clinical Note
FYI, she has decided to proceed with Ocrevus and will do labs next week. Trying to conceive is on the backburner right now.

## 2020-01-30 ENCOUNTER — TELEPHONE (OUTPATIENT)
Dept: PHARMACY | Facility: CLINIC | Age: 35
End: 2020-01-30

## 2020-02-04 RX ORDER — DROSPIRENONE AND ETHINYL ESTRADIOL TABLETS 0.02-3(28)
1 KIT ORAL DAILY
Qty: 90 TABLET | Refills: 0 | Status: SHIPPED | OUTPATIENT
Start: 2020-02-04 | End: 2020-04-30 | Stop reason: SDUPTHER

## 2020-02-06 ENCOUNTER — LAB VISIT (OUTPATIENT)
Dept: LAB | Facility: HOSPITAL | Age: 35
End: 2020-02-06
Attending: INTERNAL MEDICINE
Payer: COMMERCIAL

## 2020-02-06 ENCOUNTER — TELEPHONE (OUTPATIENT)
Dept: NEUROLOGY | Facility: CLINIC | Age: 35
End: 2020-02-06

## 2020-02-06 DIAGNOSIS — G35 MULTIPLE SCLEROSIS: ICD-10-CM

## 2020-02-06 LAB
ALBUMIN SERPL BCP-MCNC: 3.7 G/DL (ref 3.5–5.2)
ALP SERPL-CCNC: 48 U/L (ref 55–135)
ALT SERPL W/O P-5'-P-CCNC: 20 U/L (ref 10–44)
ANION GAP SERPL CALC-SCNC: 9 MMOL/L (ref 8–16)
AST SERPL-CCNC: 23 U/L (ref 10–40)
BASOPHILS # BLD AUTO: 0.01 K/UL (ref 0–0.2)
BASOPHILS NFR BLD: 0.2 % (ref 0–1.9)
BILIRUB SERPL-MCNC: 0.5 MG/DL (ref 0.1–1)
BUN SERPL-MCNC: 7 MG/DL (ref 6–20)
CALCIUM SERPL-MCNC: 8.9 MG/DL (ref 8.7–10.5)
CHLORIDE SERPL-SCNC: 106 MMOL/L (ref 95–110)
CO2 SERPL-SCNC: 24 MMOL/L (ref 23–29)
CREAT SERPL-MCNC: 0.7 MG/DL (ref 0.5–1.4)
DIFFERENTIAL METHOD: ABNORMAL
EOSINOPHIL # BLD AUTO: 0.1 K/UL (ref 0–0.5)
EOSINOPHIL NFR BLD: 1.4 % (ref 0–8)
ERYTHROCYTE [DISTWIDTH] IN BLOOD BY AUTOMATED COUNT: 11.7 % (ref 11.5–14.5)
EST. GFR  (AFRICAN AMERICAN): >60 ML/MIN/1.73 M^2
EST. GFR  (NON AFRICAN AMERICAN): >60 ML/MIN/1.73 M^2
GLUCOSE SERPL-MCNC: 89 MG/DL (ref 70–110)
HBV CORE AB SERPL QL IA: NEGATIVE
HBV SURFACE AB SER-ACNC: POSITIVE M[IU]/ML
HBV SURFACE AG SERPL QL IA: NEGATIVE
HCT VFR BLD AUTO: 35.8 % (ref 37–48.5)
HCV AB SERPL QL IA: NEGATIVE
HEPATITIS A ANTIBODY, IGG: NEGATIVE
HGB BLD-MCNC: 11.7 G/DL (ref 12–16)
HIV 1+2 AB+HIV1 P24 AG SERPL QL IA: NEGATIVE
IGA SERPL-MCNC: 146 MG/DL (ref 40–350)
IGG SERPL-MCNC: 1286 MG/DL (ref 650–1600)
IGM SERPL-MCNC: 62 MG/DL (ref 50–300)
IMM GRANULOCYTES # BLD AUTO: 0.01 K/UL (ref 0–0.04)
IMM GRANULOCYTES NFR BLD AUTO: 0.2 % (ref 0–0.5)
LYMPHOCYTES # BLD AUTO: 1.3 K/UL (ref 1–4.8)
LYMPHOCYTES NFR BLD: 30 % (ref 18–48)
MCH RBC QN AUTO: 33.1 PG (ref 27–31)
MCHC RBC AUTO-ENTMCNC: 32.7 G/DL (ref 32–36)
MCV RBC AUTO: 101 FL (ref 82–98)
MONOCYTES # BLD AUTO: 0.2 K/UL (ref 0.3–1)
MONOCYTES NFR BLD: 5.6 % (ref 4–15)
NEUTROPHILS # BLD AUTO: 2.7 K/UL (ref 1.8–7.7)
NEUTROPHILS NFR BLD: 62.6 % (ref 38–73)
NRBC BLD-RTO: 0 /100 WBC
PLATELET # BLD AUTO: 91 K/UL (ref 150–350)
PMV BLD AUTO: 11.5 FL (ref 9.2–12.9)
POTASSIUM SERPL-SCNC: 4.2 MMOL/L (ref 3.5–5.1)
PROT SERPL-MCNC: 7.4 G/DL (ref 6–8.4)
RBC # BLD AUTO: 3.53 M/UL (ref 4–5.4)
SODIUM SERPL-SCNC: 139 MMOL/L (ref 136–145)
WBC # BLD AUTO: 4.27 K/UL (ref 3.9–12.7)

## 2020-02-06 PROCEDURE — 85025 COMPLETE CBC W/AUTO DIFF WBC: CPT

## 2020-02-06 PROCEDURE — 86592 SYPHILIS TEST NON-TREP QUAL: CPT

## 2020-02-06 PROCEDURE — 86359 T CELLS TOTAL COUNT: CPT

## 2020-02-06 PROCEDURE — 82784 ASSAY IGA/IGD/IGG/IGM EACH: CPT | Mod: 59

## 2020-02-06 PROCEDURE — 86787 VARICELLA-ZOSTER ANTIBODY: CPT

## 2020-02-06 PROCEDURE — 86706 HEP B SURFACE ANTIBODY: CPT

## 2020-02-06 PROCEDURE — 86803 HEPATITIS C AB TEST: CPT

## 2020-02-06 PROCEDURE — 86682 HELMINTH ANTIBODY: CPT

## 2020-02-06 PROCEDURE — 86703 HIV-1/HIV-2 1 RESULT ANTBDY: CPT

## 2020-02-06 PROCEDURE — 80053 COMPREHEN METABOLIC PANEL: CPT

## 2020-02-06 PROCEDURE — 86704 HEP B CORE ANTIBODY TOTAL: CPT

## 2020-02-06 PROCEDURE — 86790 VIRUS ANTIBODY NOS: CPT

## 2020-02-06 PROCEDURE — 86360 T CELL ABSOLUTE COUNT/RATIO: CPT

## 2020-02-06 PROCEDURE — 87340 HEPATITIS B SURFACE AG IA: CPT

## 2020-02-07 ENCOUNTER — PATIENT MESSAGE (OUTPATIENT)
Dept: NEUROLOGY | Facility: CLINIC | Age: 35
End: 2020-02-07

## 2020-02-07 LAB
ABSOLUTE CD3: 1007 CELLS/UL (ref 700–2100)
ABSOLUTE CD8: 234 CELLS/UL (ref 200–900)
CD3%: 73.8 % (ref 55–83)
CD3+CD4+ CELLS # BLD: 761 CELLS/UL (ref 300–1400)
CD3+CD4+ CELLS NFR BLD: 55.7 % (ref 28–57)
CD4/CD8 RATIO: 3.25 (ref 0.9–3.6)
CD8 % SUPPRESSOR T CELL: 17.1 % (ref 10–39)
RPR SER QL: NORMAL
STRONGYLOIDES ANTIBODY IGG: NEGATIVE

## 2020-02-09 LAB
VARICELLA INTERPRETATION: POSITIVE
VARICELLA ZOSTER IGG: 2.4 ISR (ref 0–0.9)

## 2020-02-10 ENCOUNTER — TELEPHONE (OUTPATIENT)
Dept: NEUROLOGY | Facility: CLINIC | Age: 35
End: 2020-02-10

## 2020-02-10 DIAGNOSIS — G35 MULTIPLE SCLEROSIS: Primary | ICD-10-CM

## 2020-02-10 NOTE — TELEPHONE ENCOUNTER
RPR non-reactive  VZV IgG positive (past infection)  Strongyloides Ab negative  NK9=466 (normal)  hep A IgG negative  hep B Core Ab negative  hep C Ab negative  hep B Surface Ag negative  hep B Surface Ab positive (past vaccination)  HIV negative  Normal immunogloubulins  WBC=4.27  XXT=9793      TB Gold in process   JCV pending     ID scheduled on 2/13.

## 2020-02-13 ENCOUNTER — OFFICE VISIT (OUTPATIENT)
Dept: INFECTIOUS DISEASES | Facility: CLINIC | Age: 35
End: 2020-02-13
Payer: COMMERCIAL

## 2020-02-13 ENCOUNTER — CLINICAL SUPPORT (OUTPATIENT)
Dept: INFECTIOUS DISEASES | Facility: CLINIC | Age: 35
End: 2020-02-13
Payer: COMMERCIAL

## 2020-02-13 VITALS
WEIGHT: 143.31 LBS | SYSTOLIC BLOOD PRESSURE: 113 MMHG | TEMPERATURE: 98 F | BODY MASS INDEX: 23.88 KG/M2 | HEIGHT: 65 IN | DIASTOLIC BLOOD PRESSURE: 72 MMHG | HEART RATE: 75 BPM

## 2020-02-13 DIAGNOSIS — G35 MULTIPLE SCLEROSIS, RELAPSING-REMITTING: Primary | ICD-10-CM

## 2020-02-13 DIAGNOSIS — G35 MULTIPLE SCLEROSIS, RELAPSING-REMITTING: ICD-10-CM

## 2020-02-13 LAB
JCPYV AB SERPL QL IA: POSITIVE
JCV INDEX: 1.52

## 2020-02-13 PROCEDURE — 99204 OFFICE O/P NEW MOD 45 MIN: CPT | Mod: S$GLB,,, | Performed by: INTERNAL MEDICINE

## 2020-02-13 PROCEDURE — 90715 TDAP VACCINE 7 YRS/> IM: CPT | Mod: S$GLB,,, | Performed by: INTERNAL MEDICINE

## 2020-02-13 PROCEDURE — 90670 PNEUMOCOCCAL CONJUGATE VACCINE 13-VALENT LESS THAN 5YO & GREATER THAN: ICD-10-PCS | Mod: S$GLB,,, | Performed by: INTERNAL MEDICINE

## 2020-02-13 PROCEDURE — 90471 FLU VACCINE (QUAD) GREATER THAN OR EQUAL TO 3YO PRESERVATIVE FREE IM: ICD-10-PCS | Mod: S$GLB,,, | Performed by: INTERNAL MEDICINE

## 2020-02-13 PROCEDURE — 3008F PR BODY MASS INDEX (BMI) DOCUMENTED: ICD-10-PCS | Mod: CPTII,S$GLB,, | Performed by: INTERNAL MEDICINE

## 2020-02-13 PROCEDURE — 90632 HEPATITIS A VACCINE ADULT IM: ICD-10-PCS | Mod: S$GLB,,, | Performed by: INTERNAL MEDICINE

## 2020-02-13 PROCEDURE — 90715 TDAP VACCINE GREATER THAN OR EQUAL TO 7YO IM: ICD-10-PCS | Mod: S$GLB,,, | Performed by: INTERNAL MEDICINE

## 2020-02-13 PROCEDURE — 99999 PR PBB SHADOW E&M-EST. PATIENT-LVL III: ICD-10-PCS | Mod: PBBFAC,,, | Performed by: INTERNAL MEDICINE

## 2020-02-13 PROCEDURE — 3008F BODY MASS INDEX DOCD: CPT | Mod: CPTII,S$GLB,, | Performed by: INTERNAL MEDICINE

## 2020-02-13 PROCEDURE — 99204 PR OFFICE/OUTPT VISIT, NEW, LEVL IV, 45-59 MIN: ICD-10-PCS | Mod: S$GLB,,, | Performed by: INTERNAL MEDICINE

## 2020-02-13 PROCEDURE — 90472 HEPATITIS A VACCINE ADULT IM: ICD-10-PCS | Mod: S$GLB,,, | Performed by: INTERNAL MEDICINE

## 2020-02-13 PROCEDURE — 90472 IMMUNIZATION ADMIN EACH ADD: CPT | Mod: S$GLB,,, | Performed by: INTERNAL MEDICINE

## 2020-02-13 PROCEDURE — 99999 PR PBB SHADOW E&M-EST. PATIENT-LVL III: CPT | Mod: PBBFAC,,, | Performed by: INTERNAL MEDICINE

## 2020-02-13 PROCEDURE — 90686 IIV4 VACC NO PRSV 0.5 ML IM: CPT | Mod: S$GLB,,, | Performed by: INTERNAL MEDICINE

## 2020-02-13 PROCEDURE — 90632 HEPA VACCINE ADULT IM: CPT | Mod: S$GLB,,, | Performed by: INTERNAL MEDICINE

## 2020-02-13 PROCEDURE — 90471 IMMUNIZATION ADMIN: CPT | Mod: S$GLB,,, | Performed by: INTERNAL MEDICINE

## 2020-02-13 PROCEDURE — 90686 FLU VACCINE (QUAD) GREATER THAN OR EQUAL TO 3YO PRESERVATIVE FREE IM: ICD-10-PCS | Mod: S$GLB,,, | Performed by: INTERNAL MEDICINE

## 2020-02-13 PROCEDURE — 90670 PCV13 VACCINE IM: CPT | Mod: S$GLB,,, | Performed by: INTERNAL MEDICINE

## 2020-02-13 NOTE — PROGRESS NOTES
Immunocompromised Infectious Diseases Consult    Subjective:     Patient ID: King Perez is a 35 y.o. female    Chief Complaint   Patient presents with    Multiple Sclerosis        Reason for Visit:  Pre-biologic evaluation    35F PMH MS who presents to ID clinic after referral from neurologist before starting ocrevus. No active infections today. Plans to start ocrevus in April. Last MS flare was in November. She does not recall any of her vaccine history in the last 10 years. She also mentions plans to go on a 14 day vacation to [place], [place], and [place] at the end of September with her .      Infectious History:  Current/recent infections or currently taking antibiotics?  no  History of recurrent infections (sinuses, throat, bladder/kidneys, intestines, skin, dental, lung, catheter (HD/PD) related, or peritonitis/SBP)?  no  Any major hospitalizations due to infection?  no  If diabetic, history of diabetic foot infection/osteomyelitis?  no  History of shingles?  no  History of STDs (syphilis, viral hepatitis, HIV)?  no  Exposure to TB or ever had a positive TB skin test?  no  History of residence in coccidioides endemic areas ([facility]? no  Any foreign travel?  no     Social/Environmental:  Animal exposures (dogs, cats, farm animals, bird cages, fish tanks):  yes 2 dogs, 1 cat - all vaccinated  Hobbies (gardening, hike, fish/hunting, etc): likes to go running outside  Consumption of raw/undercooked meat or seafood?  yes enjoys sushi, raw oysters, she is a vegetarian  Any injectable or smoked recreational drug use? no     Immunization History:  Childhood vaccines:  yes  Annual Flu vaccine: no  Tetanus/TDAP: unknown  Hepatitis A: no  Hepatitis B: immune on serology  Prevnar-13: no  Pneumovax-23: no  Zostavax/Shingrix: no, + VZV Ab, hx of chicken pox as child    Review of Systems   Constitution: Negative for chills, decreased appetite, fever, malaise/fatigue and night sweats.   HENT: Negative for  congestion and sore throat.    Eyes: Negative for blurred vision, double vision, vision loss in left eye, vision loss in right eye and visual disturbance.   Cardiovascular: Negative for chest pain, dyspnea on exertion, irregular heartbeat and leg swelling.   Respiratory: Negative for cough, hemoptysis, shortness of breath and sputum production.    Hematologic/Lymphatic: Negative for adenopathy. Does not bruise/bleed easily.   Skin: Negative for rash and suspicious lesions.   Musculoskeletal: Negative for arthritis, joint pain, muscle weakness and myalgias.   Gastrointestinal: Negative for abdominal pain, constipation, diarrhea, heartburn, nausea and vomiting.   Genitourinary: Negative for dysuria, flank pain, frequency and genital sores.   Neurological: Negative for dizziness, focal weakness, numbness, paresthesias, sensory change, tremors and weakness.   Psychiatric/Behavioral: Negative for altered mental status and depression. The patient is not nervous/anxious.    Allergic/Immunologic: Negative for environmental allergies and persistent infections.        Past Medical History:   Diagnosis Date    Multiple sclerosis      Past Surgical History:   Procedure Laterality Date    CERVICAL BIOPSY  W/ LOOP ELECTRODE EXCISION      DILATION AND CURETTAGE OF UTERUS  2014     Family History   Problem Relation Age of Onset    Hypertension Mother     No Known Problems Father     Cancer Maternal Grandmother         brain tumor - later in life    Cancer Paternal Grandmother 60        breast CA     Social History     Tobacco Use    Smoking status: Never Smoker    Smokeless tobacco: Never Used   Substance Use Topics    Alcohol use: Yes     Comment: socially    Drug use: No       Objective:     Physical Exam   Constitutional: She is oriented to person, place, and time. She appears well-developed and well-nourished. No distress.   HENT:   Right Ear: External ear normal.   Left Ear: External ear normal.   Nose: Nose normal.    Mouth/Throat: Oropharynx is clear and moist.   Eyes: Conjunctivae and EOM are normal.   Neck: Normal range of motion. Neck supple.   Cardiovascular: Normal rate, regular rhythm, normal heart sounds and intact distal pulses.   No murmur heard.  Pulmonary/Chest: Effort normal. No respiratory distress.   Abdominal: Soft. Bowel sounds are normal. She exhibits no distension. There is no tenderness.   Musculoskeletal: Normal range of motion. She exhibits no edema.   Neurological: She is alert and oriented to person, place, and time. No cranial nerve deficit. Coordination normal.   Skin: Skin is warm and dry. No rash noted. She is not diaphoretic. No erythema.   Psychiatric: She has a normal mood and affect. Her behavior is normal.   Vitals reviewed.      Data:    All data, including recent labs, radiology, and pathology, has been independently reviewed.    Labs:    Recent Labs   Lab Result Units 11/20/19  0802 02/06/20  0907   WBC K/uL 4.51 4.27   Hemoglobin g/dL 11.7* 11.7*   Hematocrit % 36.0* 35.8*   Sodium mmol/L  --  139   Potassium mmol/L  --  4.2   Chloride mmol/L  --  106   BUN, Bld mg/dL  --  7   Creatinine mg/dL  --  0.7   AST U/L 27 23   ALT U/L 30 20   Alkaline Phosphatase U/L 40* 48*   Total Bilirubin mg/dL 0.4 0.5   HIV 1/2 Ag/Ab   --  Negative      All serologies have been reviewed.    Radiology:     No results found in the last 24 hours.     Assessment:    35F seen in ID clinic today for evaluation prior to starting Ocrevus for MS. No absolute contraindication to initiating medication from ID standpoint. Will get patient updated on all recommended vaccines. She will receive influenza, hep A #1, Tdap, and prevnar today. She will receive pneumovax in ~ 1 month. She will receive Hep A #2 on or after 8/13/2020. I have recommended patient receive shingrix (2 doses, 8 weeks apart) at age 48 given + VZV Ab. I have recommended that patient receives annual flu vaccine, she agrees. I have recommended that patient  return to ID clinic with her  for pre-travel advice once their itinerary for their trip to [place] has been booked for further vaccine recommendations and need for malaria ppx. Further counseling as below.    1. Multiple sclerosis, relapsing-remitting  Hepatitis A Vaccine (Adult) (IM)    Hepatitis A Vaccine (Adult) (IM)    Influenza - Quadrivalent (PF)    Tdap Vaccine    Pneumococcal Conjugate Vaccine (13 Valent) (IM)    Pneumococcal polysaccharide vaccine 23-valent greater than or equal to 1yo subcutaneous/IM       Counseling:   I discussed with the patient the risk for increased susceptibility to infections following initiation of immunosuppressing therapy.  The patient has been counseled on the importance of vaccinations including but not limited to a yearly flu vaccine. Patient was also instructed to encourage that family/caretakers receive their flu vaccine yearly. The patient was encouraged to contact us about any problems that may develop after immunizations and possible side effects were reviewed.      Specific guidance has been provided to the patient regarding the patient's occupation, hobbies and activities to avoid future infectious complications. These include but are not limited to: avoiding raw/undercooked meats and seafood, avoiding unpasteurized milk/cheeses, proper (hand) hygiene, contact with animals and appropriate vaccination of animals, use of mosquito/tick precautions, avoiding walking barefoot, avoiding sick contacts, and seeking medical advice prior to foreign travel (specifically developing countries).      Patient's care has been discussed with the following providers:   - Margie Fierro   - Amanda Vela MD  - Dr. Lerma    Follow up in 6 months for travel encounter.     Jodi Landin DO  Infectious Disease Fellow

## 2020-02-13 NOTE — TELEPHONE ENCOUNTER
TB Gold negative. Ocrevus paperwork can be submitted. We can plan Ocrevus around 3/12 or 3/13, although she may want to wait until the next Monday to do it. Once approved, we can contact her about scheduling and advise when to stop Rebif.

## 2020-02-26 ENCOUNTER — TELEPHONE (OUTPATIENT)
Dept: PHARMACY | Facility: CLINIC | Age: 35
End: 2020-02-26

## 2020-02-28 RX ORDER — EPINEPHRINE 0.3 MG/.3ML
0.3 INJECTION SUBCUTANEOUS
Status: CANCELLED | OUTPATIENT
Start: 2020-02-28

## 2020-02-28 RX ORDER — HEPARIN 100 UNIT/ML
500 SYRINGE INTRAVENOUS
Status: CANCELLED | OUTPATIENT
Start: 2020-02-28

## 2020-02-28 RX ORDER — ACETAMINOPHEN 325 MG/1
1000 TABLET ORAL
Status: CANCELLED | OUTPATIENT
Start: 2020-02-28

## 2020-02-28 RX ORDER — SODIUM CHLORIDE 0.9 % (FLUSH) 0.9 %
10 SYRINGE (ML) INJECTION
Status: CANCELLED | OUTPATIENT
Start: 2020-02-28

## 2020-02-28 RX ORDER — DIPHENHYDRAMINE HYDROCHLORIDE 50 MG/ML
50 INJECTION INTRAMUSCULAR; INTRAVENOUS
Status: CANCELLED | OUTPATIENT
Start: 2020-02-28

## 2020-02-28 RX ORDER — FAMOTIDINE 10 MG/ML
20 INJECTION INTRAVENOUS
Status: CANCELLED | OUTPATIENT
Start: 2020-02-28

## 2020-03-03 ENCOUNTER — PATIENT MESSAGE (OUTPATIENT)
Dept: NEUROLOGY | Facility: CLINIC | Age: 35
End: 2020-03-03

## 2020-03-09 ENCOUNTER — LAB VISIT (OUTPATIENT)
Dept: LAB | Facility: HOSPITAL | Age: 35
End: 2020-03-09
Attending: CLINICAL NURSE SPECIALIST
Payer: COMMERCIAL

## 2020-03-09 DIAGNOSIS — G35 MULTIPLE SCLEROSIS: ICD-10-CM

## 2020-03-09 LAB
BASOPHILS # BLD AUTO: 0.02 K/UL (ref 0–0.2)
BASOPHILS NFR BLD: 0.3 % (ref 0–1.9)
DIFFERENTIAL METHOD: ABNORMAL
EOSINOPHIL # BLD AUTO: 0.1 K/UL (ref 0–0.5)
EOSINOPHIL NFR BLD: 1.8 % (ref 0–8)
ERYTHROCYTE [DISTWIDTH] IN BLOOD BY AUTOMATED COUNT: 11.9 % (ref 11.5–14.5)
HCT VFR BLD AUTO: 32.6 % (ref 37–48.5)
HGB BLD-MCNC: 10.7 G/DL (ref 12–16)
IMM GRANULOCYTES # BLD AUTO: 0.05 K/UL (ref 0–0.04)
IMM GRANULOCYTES NFR BLD AUTO: 0.7 % (ref 0–0.5)
LYMPHOCYTES # BLD AUTO: 3 K/UL (ref 1–4.8)
LYMPHOCYTES NFR BLD: 41.8 % (ref 18–48)
MCH RBC QN AUTO: 33.2 PG (ref 27–31)
MCHC RBC AUTO-ENTMCNC: 32.8 G/DL (ref 32–36)
MCV RBC AUTO: 101 FL (ref 82–98)
MONOCYTES # BLD AUTO: 0.5 K/UL (ref 0.3–1)
MONOCYTES NFR BLD: 6.6 % (ref 4–15)
NEUTROPHILS # BLD AUTO: 3.6 K/UL (ref 1.8–7.7)
NEUTROPHILS NFR BLD: 48.8 % (ref 38–73)
NRBC BLD-RTO: 0 /100 WBC
PLATELET # BLD AUTO: 180 K/UL (ref 150–350)
PMV BLD AUTO: 10.5 FL (ref 9.2–12.9)
RBC # BLD AUTO: 3.22 M/UL (ref 4–5.4)
WBC # BLD AUTO: 7.27 K/UL (ref 3.9–12.7)

## 2020-03-09 PROCEDURE — 85025 COMPLETE CBC W/AUTO DIFF WBC: CPT

## 2020-03-09 PROCEDURE — 36415 COLL VENOUS BLD VENIPUNCTURE: CPT | Mod: PO

## 2020-03-12 ENCOUNTER — PATIENT MESSAGE (OUTPATIENT)
Dept: NEUROLOGY | Facility: CLINIC | Age: 35
End: 2020-03-12

## 2020-03-13 ENCOUNTER — PATIENT MESSAGE (OUTPATIENT)
Dept: NEUROLOGY | Facility: CLINIC | Age: 35
End: 2020-03-13

## 2020-03-16 ENCOUNTER — PATIENT MESSAGE (OUTPATIENT)
Dept: NEUROLOGY | Facility: CLINIC | Age: 35
End: 2020-03-16

## 2020-03-17 ENCOUNTER — PATIENT MESSAGE (OUTPATIENT)
Dept: NEUROLOGY | Facility: CLINIC | Age: 35
End: 2020-03-17

## 2020-03-17 DIAGNOSIS — G35 MULTIPLE SCLEROSIS: ICD-10-CM

## 2020-04-02 ENCOUNTER — TELEPHONE (OUTPATIENT)
Dept: INTERNAL MEDICINE | Facility: CLINIC | Age: 35
End: 2020-04-02

## 2020-04-02 NOTE — TELEPHONE ENCOUNTER
----- Message from RADHA Mariscal, CNS sent at 4/2/2020 11:19 AM CDT -----  Hi, Dr. Vela, Just wanted to let you know that Ms. Perez's recent CBC shows a slightly worsened anemia compared to last year. Should she make a virtual visit appt with you to discuss?

## 2020-04-07 ENCOUNTER — OFFICE VISIT (OUTPATIENT)
Dept: INTERNAL MEDICINE | Facility: CLINIC | Age: 35
End: 2020-04-07
Payer: COMMERCIAL

## 2020-04-07 DIAGNOSIS — D53.9 MACROCYTIC ANEMIA: Primary | ICD-10-CM

## 2020-04-07 PROCEDURE — 99213 PR OFFICE/OUTPT VISIT, EST, LEVL III, 20-29 MIN: ICD-10-PCS | Mod: 95,,, | Performed by: INTERNAL MEDICINE

## 2020-04-07 PROCEDURE — 99213 OFFICE O/P EST LOW 20 MIN: CPT | Mod: 95,,, | Performed by: INTERNAL MEDICINE

## 2020-04-07 NOTE — PROGRESS NOTES
The patient location is: home  The chief complaint leading to consultation is: anemia  Visit type: Virtual visit with synchronous audio and video  Total time spent with patient: 20 min  Each patient to whom he or she provides medical services by telemedicine is:  (1) informed of the relationship between the physician and patient and the respective role of any other health care provider with respect to management of the patient; and (2) notified that he or she may decline to receive medical services by telemedicine and may withdraw from such care at any time.    Notes:   Pt recently had labs done w/ MS clinic and was notified that her anemia had worsened. Thus pt is scheduled to have virtual visit w/ me today. Hgb went from 11.7 to 10.7. At baseline, pt does have slightly macrocytic anemia that is chronic (Hgb baseline 11.3-12 dating back to 7/2017 - Hgb was 12 in 7/2017). Was on Copaxone previously for MS up until about 2015, when she was on Rebif.  b12 and folate 3/2019 were wnl. RDW however has been in the normal range.  Hgb electrophoresis WNL 3/21/19  Plans to go on Ocrevus this month but back on rebif until COVID 19 risks decreases.   No blood in stool or urine.     Works out daily and no issues. At night, sometimes may feel like she has some SOB. Would wake up in the middle of the night. Thinks may be associated w/ anxiety.   Occasional heart flutter (one beat) - occurs maybe about once a month.     Broke her fibula about 5 weeks ago. No high intensity exercise.     Comprehensive review of systems otherwise negative. See history/subjective section for more details.    Gen - A+OX4, NAD  CHEST - completing full sentences.     Diagnoses and all orders for this visit:    Macrocytic anemia - may be due to rebif?  -     CBC auto differential; Future; Expected date: 04/07/2020  -     Folate; Future; Expected date: 04/07/2020  -     Vitamin B12; Future; Expected date: 04/07/2020  -     RETICULOCYTES; Future; Expected  date: 04/07/2020  -     Comprehensive metabolic panel; Future; Expected date: 04/07/2020  -     TSH; Future; Expected date: 04/07/2020    Amanda Vela MD  Department of Internal Medicine - [facility]  11:14 AM

## 2020-04-27 ENCOUNTER — TELEPHONE (OUTPATIENT)
Dept: PHARMACY | Facility: CLINIC | Age: 35
End: 2020-04-27

## 2020-04-27 NOTE — TELEPHONE ENCOUNTER
Refill call regarding Rebif at [facility]. Will prepare for shipment with patient consent on  to arrive . Copay 0.00. Patient has not started any new medications including OTC drugs. Patient has not had any medication/ dose or instruction changes. No new allergies or side effects reported with this shipment. Medication is being taken as prescribed by physician and properly stored. Two patient identifiers:  and Address verified. Patient has no questions or concerns for RPH. Patient has 5 injections on hand and injects MWF. No sharps needed.

## 2020-04-28 ENCOUNTER — TELEPHONE (OUTPATIENT)
Dept: NEUROLOGY | Facility: CLINIC | Age: 35
End: 2020-04-28

## 2020-04-28 NOTE — TELEPHONE ENCOUNTER
----- Message from Trinity Vang sent at 4/28/2020  9:51 AM CDT -----  Contact: Pt. [phone]  The patient would like to speak to someone regarding her upcoming appointment. Please contact the patient to discuss further.

## 2020-04-29 ENCOUNTER — OFFICE VISIT (OUTPATIENT)
Dept: NEUROLOGY | Facility: CLINIC | Age: 35
End: 2020-04-29
Payer: COMMERCIAL

## 2020-04-29 ENCOUNTER — TELEPHONE (OUTPATIENT)
Dept: NEUROLOGY | Facility: CLINIC | Age: 35
End: 2020-04-29

## 2020-04-29 VITALS — WEIGHT: 138 LBS | BODY MASS INDEX: 22.99 KG/M2 | HEIGHT: 65 IN

## 2020-04-29 DIAGNOSIS — Z79.899 HIGH RISK MEDICATION USE: ICD-10-CM

## 2020-04-29 DIAGNOSIS — G35 MULTIPLE SCLEROSIS: Primary | ICD-10-CM

## 2020-04-29 DIAGNOSIS — Z29.89 PROPHYLACTIC IMMUNOTHERAPY: ICD-10-CM

## 2020-04-29 DIAGNOSIS — Z71.89 COUNSELING REGARDING GOALS OF CARE: ICD-10-CM

## 2020-04-29 PROCEDURE — 99213 OFFICE O/P EST LOW 20 MIN: CPT | Mod: 95,,, | Performed by: CLINICAL NURSE SPECIALIST

## 2020-04-29 PROCEDURE — 99213 PR OFFICE/OUTPT VISIT, EST, LEVL III, 20-29 MIN: ICD-10-PCS | Mod: 95,,, | Performed by: CLINICAL NURSE SPECIALIST

## 2020-04-29 PROCEDURE — 3008F BODY MASS INDEX DOCD: CPT | Mod: CPTII,,, | Performed by: CLINICAL NURSE SPECIALIST

## 2020-04-29 PROCEDURE — 3008F PR BODY MASS INDEX (BMI) DOCUMENTED: ICD-10-PCS | Mod: CPTII,,, | Performed by: CLINICAL NURSE SPECIALIST

## 2020-04-29 RX ORDER — ASCORBIC ACID 500 MG
500 TABLET ORAL DAILY
COMMUNITY
End: 2020-09-14

## 2020-04-29 NOTE — Clinical Note
We can reschedule Ocrevus infusions in May. Mondays are her preference, but she can be flexible. Just waiting to hear from BB as to whether or not she has to get 2nd pneumonia vaccine first. I will let you know. I think her copay assistance and auth should still be in place from when she was scheduled previously.

## 2020-04-29 NOTE — TELEPHONE ENCOUNTER
Will advise patient to reschedule Pneumovax injection. We can plan first Ocrevus 2 weeks after immunization. Msg sent.       RPR non-reactive; VZV IgG positive (past infection); Strongyloides Ab negative, QI7=589 (normal); hep A IgG negative, hep B Core Ab negative, hep C Ab negative, hep B Surface Ag negative, hep B Surface Ab positive (past vaccination); HIV negative

## 2020-04-29 NOTE — PROGRESS NOTES
Subjective:          Patient ID: King Perez is a 35 y.o. female who presents today for a routine visit for MS.  She was last seen in January 2020. The history has been provided by the patient. Today's visit is a virtual visit.    The patient location is: her home   The chief complaint leading to consultation is: Multiple Sclerosis   Visit type: audiovisual  Total time spent with patient: 20 minutes   Each patient to whom he or she provides medical services by telemedicine is:  (1) informed of the relationship between the physician and patient and the respective role of any other health care provider with respect to management of the patient; and (2) notified that he or she may decline to receive medical services by telemedicine and may withdraw from such care at any time.    MS HPI:  · DMT: interferon beta-1a SQ 44mcg SQ 3x/week   · Side effects from DMT? No  · Taking vitamin D3 as recommended? Yes - Dose: 5000 units daily  · She has recently seen PCP for macrocytic anemia evaluation.   · She thinks she had a urinary tract infection last week. She had some urgency, hesitancy, and lower abdominal discomfort. This has improved. She denies any fever. She also got overheated a few times last week.   · She broke her right ankle when walking in platform shoes in early March. She wore a boot for 6 weeks. She still has some pain in the ankle.   · She has been exercising regularly.     Medications:  Current Outpatient Medications   Medication Sig    B complex-C-E-Zn Tab Take 1 tablet by mouth once daily.    interferon beta-1a, albumin, (REBIF REBIDOSE) 44 mcg/0.5 mL PnIj Inject 44 mcg into the skin 3 (three) times a week.    LORYNA, 28, 3-0.02 mg per tablet Take 1 tablet by mouth once daily.    vitamin D 1000 units Tab Take 5,000 Units by mouth once daily.        SOCIAL HISTORY  Social History     Tobacco Use    Smoking status: Never Smoker    Smokeless tobacco: Never Used   Substance Use Topics    Alcohol  "use: Yes     Comment: socially    Drug use: No       Living arrangements - the patient lives with her      ROS:    Fatigue: No  Sleep: Sleeps "good enough."   Bladder: As above in HPI  Bowels: regular  Spasticity: None  Vision: None  Cognitive: No change  Mood: Has had situational anxiety re: COVID   Gait/Falls: None, aside from broken ankle  Hand dysfunction:None  Speech/swallowing: No issues  Heat sensitivity: heat makes her feel out of it; she is also sensitive to cold. She also has significant fatigue when overheated.   Copay Assist: $0 copay for Rebif  Support system: Good at home            Objective:          Neurologic Exam     Mental Status   Oriented to person, place, and time.   Attention: normal. Concentration: normal.   Speech: speech is normal   Level of consciousness: alert  Knowledge: good.   Normal comprehension.     Cranial Nerves     CN VII   Right facial weakness: none  Left facial weakness: none    CN VIII   Hearing: intact    CN XII   Tongue deviation: none    Motor Exam   She can raise arms overhead, lower them, wiggle her fingers, and make a fist with each hand.      Gait, Coordination, and Reflexes     Gait  Gait: normal    Coordination   Tandem walking coordination: normal  Normal rapid sequential movements in upper extremities.          Imaging:       Results for orders placed during the hospital encounter of 11/25/19   MRI Brain Demyelinating W W/O Contrast    Impression 1. In this patient with a reported history of multiple sclerosis, there is a new lesion within the subcortical white matter of the left temporal lobe with associated punctate focus of enhancement concerning for active demyelination.  This report was flagged in Epic as abnormal.    Electronically signed by resident: Barron Johnson  Date:    11/25/2019  Time:    11:26    Electronically signed by: Chris Shaffer MD  Date:    11/25/2019  Time:    15:51     Results for orders placed during the hospital encounter of " 11/26/18   MRI Cervical Spine Demyelinating W W/O Contrast    Impression Continued multiple short segment foci of T2 stir signal hyperintensities within the cervical and thoracic cord in light of history concerning for prior areas of demyelination.    No evidence for definite new cord lesion or abnormal intrathecal enhancement to suggest significant interval or active demyelination.    Please see above for additional details      Electronically signed by: Dennis Palmer DO  Date:    11/26/2018  Time:    16:23       Labs:     Lab Results   Component Value Date    KLLLMDSZ24OJ 58 11/20/2019    PVSBTTQH25UX 84 04/30/2019    XDPKIUYV12DV 50 11/12/2018     Lab Results   Component Value Date    JCVINDEX 1.52 (A) 02/06/2020    JCVANTIBODY Positive (A) 02/06/2020     Lab Results   Component Value Date    TL3DPDOV 73.8 02/06/2020    ABSOLUTECD3 1007.0 02/06/2020    IY0WXMPH 17.1 02/06/2020    ABSOLUTECD8 234.0 02/06/2020    RH0FHYZD 55.7 02/06/2020    ABSOLUTECD4 761.0 02/06/2020    LABCD48 3.25 02/06/2020     Lab Results   Component Value Date    WBC 7.27 03/09/2020    RBC 3.22 (L) 03/09/2020    HGB 10.7 (L) 03/09/2020    HCT 32.6 (L) 03/09/2020     (H) 03/09/2020    MCH 33.2 (H) 03/09/2020    MCHC 32.8 03/09/2020    RDW 11.9 03/09/2020     03/09/2020    MPV 10.5 03/09/2020    GRAN 3.6 03/09/2020    GRAN 48.8 03/09/2020    LYMPH 3.0 03/09/2020    LYMPH 41.8 03/09/2020    MONO 0.5 03/09/2020    MONO 6.6 03/09/2020    EOS 0.1 03/09/2020    BASO 0.02 03/09/2020    EOSINOPHIL 1.8 03/09/2020    BASOPHIL 0.3 03/09/2020     Sodium   Date Value Ref Range Status   02/06/2020 139 136 - 145 mmol/L Final     Potassium   Date Value Ref Range Status   02/06/2020 4.2 3.5 - 5.1 mmol/L Final     Chloride   Date Value Ref Range Status   02/06/2020 106 95 - 110 mmol/L Final     CO2   Date Value Ref Range Status   02/06/2020 24 23 - 29 mmol/L Final     Glucose   Date Value Ref Range Status   02/06/2020 89 70 - 110 mg/dL Final      BUN, Bld   Date Value Ref Range Status   02/06/2020 7 6 - 20 mg/dL Final     Creatinine   Date Value Ref Range Status   02/06/2020 0.7 0.5 - 1.4 mg/dL Final     Calcium   Date Value Ref Range Status   02/06/2020 8.9 8.7 - 10.5 mg/dL Final     Total Protein   Date Value Ref Range Status   02/06/2020 7.4 6.0 - 8.4 g/dL Final     Albumin   Date Value Ref Range Status   02/06/2020 3.7 3.5 - 5.2 g/dL Final     Total Bilirubin   Date Value Ref Range Status   02/06/2020 0.5 0.1 - 1.0 mg/dL Final     Comment:     For infants and newborns, interpretation of results should be based  on gestational age, weight and in agreement with clinical  observations.  Premature Infant recommended reference ranges:  Up to 24 hours.............<8.0 mg/dL  Up to 48 hours............<12.0 mg/dL  3-5 days..................<15.0 mg/dL  6-29 days.................<15.0 mg/dL       Alkaline Phosphatase   Date Value Ref Range Status   02/06/2020 48 (L) 55 - 135 U/L Final     AST   Date Value Ref Range Status   02/06/2020 23 10 - 40 U/L Final     ALT   Date Value Ref Range Status   02/06/2020 20 10 - 44 U/L Final     Anion Gap   Date Value Ref Range Status   02/06/2020 9 8 - 16 mmol/L Final     eGFR if    Date Value Ref Range Status   02/06/2020 >60.0 >60 mL/min/1.73 m^2 Final     eGFR if non    Date Value Ref Range Status   02/06/2020 >60.0 >60 mL/min/1.73 m^2 Final     Comment:     Calculation used to obtain the estimated glomerular filtration  rate (eGFR) is the CKD-EPI equation.        Lab Results   Component Value Date    HEPBSAG Negative 02/06/2020    HEPBSAB Positive (A) 02/06/2020    HEPBCAB Negative 02/06/2020           MS Impression and Plan:     NEURO MULTIPLE SCLEROSIS IMPRESSION:   MS Status:     Clinical Progression:  Clinically Stable  Plan:     DMT comment:  Will D/C Rebif and proceed with Ocrevus as planned prior to COVID. We have to reschedule Pneumovax vaccine. Hep A #2 is schedule in August.    Will schedule infusions in May, two weeks apart. Next set of labs will be due in October for CBC, Cd20, hep B.   Continue Vitamin D.     Symptom Management:  No change in symptom management (Since bladder symptoms are improved, we can defer UA and Cx. Recommend that she drink lots of water and take Vitamin C for now. )     Next Imaging Due: 11/2/2020       MRIs at 6 month interval after Ocrevus start    She will follow up with Dr. Hernandez in August 2020.     Our virtual visit today lasted 20 minutes, and 100% of this time was spent face to face with the patient. Over 50% of this visit included discussion of the treatment plan/medication changes/symptom management/exam findings/coordination of care. The patient agrees with the plan of care.    RADHA Monzon, CNS

## 2020-04-30 RX ORDER — DROSPIRENONE AND ETHINYL ESTRADIOL TABLETS 0.02-3(28)
1 KIT ORAL DAILY
Qty: 90 TABLET | Refills: 0 | Status: SHIPPED | OUTPATIENT
Start: 2020-04-30 | End: 2020-07-14 | Stop reason: SDUPTHER

## 2020-05-01 ENCOUNTER — TELEPHONE (OUTPATIENT)
Dept: OBSTETRICS AND GYNECOLOGY | Facility: CLINIC | Age: 35
End: 2020-05-01

## 2020-05-01 ENCOUNTER — CLINICAL SUPPORT (OUTPATIENT)
Dept: INFECTIOUS DISEASES | Facility: CLINIC | Age: 35
End: 2020-05-01
Payer: COMMERCIAL

## 2020-05-01 DIAGNOSIS — G35 MULTIPLE SCLEROSIS, RELAPSING-REMITTING: ICD-10-CM

## 2020-05-01 PROCEDURE — 90471 IMMUNIZATION ADMIN: CPT | Mod: S$GLB,,, | Performed by: INTERNAL MEDICINE

## 2020-05-01 PROCEDURE — 99999 PR PBB SHADOW E&M-EST. PATIENT-LVL I: CPT | Mod: PBBFAC,,,

## 2020-05-01 PROCEDURE — 99999 PR PBB SHADOW E&M-EST. PATIENT-LVL I: ICD-10-PCS | Mod: PBBFAC,,,

## 2020-05-01 PROCEDURE — 90732 PPSV23 VACC 2 YRS+ SUBQ/IM: CPT | Mod: S$GLB,,, | Performed by: INTERNAL MEDICINE

## 2020-05-01 PROCEDURE — 90471 PNEUMOCOCCAL POLYSACCHARIDE VACCINE 23-VALENT =>2YO SQ IM: ICD-10-PCS | Mod: S$GLB,,, | Performed by: INTERNAL MEDICINE

## 2020-05-01 PROCEDURE — 90732 PNEUMOCOCCAL POLYSACCHARIDE VACCINE 23-VALENT =>2YO SQ IM: ICD-10-PCS | Mod: S$GLB,,, | Performed by: INTERNAL MEDICINE

## 2020-05-01 NOTE — PROGRESS NOTES
Pt's oral temperature was 98.3. Pt denies any symptoms of illness at this time. Pt received the Pneumovax 23 vaccination. Pt tolerated the immunization well. Return appointment provided for the final dose of the Hepatitis A vaccination. Pt left the unit in NAD.

## 2020-05-01 NOTE — TELEPHONE ENCOUNTER
----- Message from Viviane Martinez NP sent at 5/1/2020  1:37 PM CDT -----  Pt on my schedule for an in-office visit Monday. Please contact pt to reschedule. I will continue to do only virtual visits until May 15 FYI.    -NF    Spoke with patient to reschedule patient appointment. Patient states she was fine with rescheduling she did receive a refill on her birth control.

## 2020-05-12 ENCOUNTER — PATIENT MESSAGE (OUTPATIENT)
Dept: NEUROLOGY | Facility: CLINIC | Age: 35
End: 2020-05-12

## 2020-05-12 ENCOUNTER — LAB VISIT (OUTPATIENT)
Dept: INTERNAL MEDICINE | Facility: CLINIC | Age: 35
End: 2020-05-12
Payer: COMMERCIAL

## 2020-05-12 DIAGNOSIS — G35 MULTIPLE SCLEROSIS: ICD-10-CM

## 2020-05-12 LAB — SARS-COV-2 RNA RESP QL NAA+PROBE: NOT DETECTED

## 2020-05-12 PROCEDURE — U0002 COVID-19 LAB TEST NON-CDC: HCPCS

## 2020-05-14 ENCOUNTER — TELEPHONE (OUTPATIENT)
Dept: PHARMACY | Facility: CLINIC | Age: 35
End: 2020-05-14

## 2020-05-14 ENCOUNTER — INFUSION (OUTPATIENT)
Dept: INFUSION THERAPY | Facility: OTHER | Age: 35
End: 2020-05-14
Attending: INTERNAL MEDICINE
Payer: COMMERCIAL

## 2020-05-14 VITALS
WEIGHT: 145.31 LBS | BODY MASS INDEX: 24.21 KG/M2 | TEMPERATURE: 98 F | SYSTOLIC BLOOD PRESSURE: 110 MMHG | HEART RATE: 76 BPM | HEIGHT: 65 IN | OXYGEN SATURATION: 98 % | DIASTOLIC BLOOD PRESSURE: 57 MMHG | RESPIRATION RATE: 16 BRPM

## 2020-05-14 DIAGNOSIS — G35 MULTIPLE SCLEROSIS: Primary | ICD-10-CM

## 2020-05-14 PROCEDURE — 96367 TX/PROPH/DG ADDL SEQ IV INF: CPT

## 2020-05-14 PROCEDURE — 63600175 PHARM REV CODE 636 W HCPCS: Performed by: PSYCHIATRY & NEUROLOGY

## 2020-05-14 PROCEDURE — 96365 THER/PROPH/DIAG IV INF INIT: CPT

## 2020-05-14 PROCEDURE — S0028 INJECTION, FAMOTIDINE, 20 MG: HCPCS | Performed by: PSYCHIATRY & NEUROLOGY

## 2020-05-14 PROCEDURE — 96375 TX/PRO/DX INJ NEW DRUG ADDON: CPT

## 2020-05-14 PROCEDURE — 25000003 PHARM REV CODE 250: Performed by: PSYCHIATRY & NEUROLOGY

## 2020-05-14 PROCEDURE — 96366 THER/PROPH/DIAG IV INF ADDON: CPT

## 2020-05-14 RX ORDER — EPINEPHRINE 0.3 MG/.3ML
0.3 INJECTION SUBCUTANEOUS
Status: DISCONTINUED | OUTPATIENT
Start: 2020-05-14 | End: 2020-05-14 | Stop reason: HOSPADM

## 2020-05-14 RX ORDER — DIPHENHYDRAMINE HYDROCHLORIDE 50 MG/ML
50 INJECTION INTRAMUSCULAR; INTRAVENOUS
Status: DISCONTINUED | OUTPATIENT
Start: 2020-05-14 | End: 2020-05-14 | Stop reason: HOSPADM

## 2020-05-14 RX ORDER — EPINEPHRINE 0.3 MG/.3ML
0.3 INJECTION SUBCUTANEOUS
Status: CANCELLED | OUTPATIENT
Start: 2020-05-28

## 2020-05-14 RX ORDER — FAMOTIDINE 10 MG/ML
20 INJECTION INTRAVENOUS
Status: COMPLETED | OUTPATIENT
Start: 2020-05-14 | End: 2020-05-14

## 2020-05-14 RX ORDER — ACETAMINOPHEN 500 MG
1000 TABLET ORAL
Status: COMPLETED | OUTPATIENT
Start: 2020-05-14 | End: 2020-05-14

## 2020-05-14 RX ORDER — DIPHENHYDRAMINE HYDROCHLORIDE 50 MG/ML
50 INJECTION INTRAMUSCULAR; INTRAVENOUS
Status: CANCELLED | OUTPATIENT
Start: 2020-05-28

## 2020-05-14 RX ORDER — ACETAMINOPHEN 500 MG
1000 TABLET ORAL
Status: CANCELLED | OUTPATIENT
Start: 2020-05-28

## 2020-05-14 RX ORDER — FAMOTIDINE 10 MG/ML
20 INJECTION INTRAVENOUS
Status: CANCELLED | OUTPATIENT
Start: 2020-05-28

## 2020-05-14 RX ORDER — SODIUM CHLORIDE 0.9 % (FLUSH) 0.9 %
10 SYRINGE (ML) INJECTION
Status: CANCELLED | OUTPATIENT
Start: 2020-05-28

## 2020-05-14 RX ORDER — SODIUM CHLORIDE 0.9 % (FLUSH) 0.9 %
10 SYRINGE (ML) INJECTION
Status: DISCONTINUED | OUTPATIENT
Start: 2020-05-14 | End: 2020-05-14 | Stop reason: HOSPADM

## 2020-05-14 RX ORDER — HEPARIN 100 UNIT/ML
500 SYRINGE INTRAVENOUS
Status: CANCELLED | OUTPATIENT
Start: 2020-05-28

## 2020-05-14 RX ORDER — HEPARIN 100 UNIT/ML
500 SYRINGE INTRAVENOUS
Status: DISCONTINUED | OUTPATIENT
Start: 2020-05-14 | End: 2020-05-14 | Stop reason: HOSPADM

## 2020-05-14 RX ADMIN — OCRELIZUMAB 300 MG: 300 INJECTION INTRAVENOUS at 10:05

## 2020-05-14 RX ADMIN — DEXTROSE: 50 INJECTION, SOLUTION INTRAVENOUS at 09:05

## 2020-05-14 RX ADMIN — DIPHENHYDRAMINE HYDROCHLORIDE 50 MG: 50 INJECTION INTRAMUSCULAR; INTRAVENOUS at 12:05

## 2020-05-14 RX ADMIN — SODIUM CHLORIDE: 0.9 INJECTION, SOLUTION INTRAVENOUS at 08:05

## 2020-05-14 RX ADMIN — DIPHENHYDRAMINE HYDROCHLORIDE 50 MG: 50 INJECTION, SOLUTION INTRAMUSCULAR; INTRAVENOUS at 08:05

## 2020-05-14 RX ADMIN — FAMOTIDINE 20 MG: 10 INJECTION, SOLUTION INTRAVENOUS at 08:05

## 2020-05-14 RX ADMIN — ACETAMINOPHEN 1000 MG: 500 TABLET, FILM COATED ORAL at 08:05

## 2020-05-14 NOTE — TELEPHONE ENCOUNTER
Call to patient to confirm that she had her first administration of Ocrevus. Verified name and . She stated that she had, and that infusion went well besides a small bout of nausea. She also had questions about returning a full box of Rebif that she had recently received that was unopened. Explained that [facility] could not take back medication once it had been dispensed, but she could reach out to her provider to see if they had any drug donation options or find a medication take-back program. She had no other questions or concerns.     Evelio Harmon, PharmD  Clinical Pharmacist  [facility]  P: [phone]

## 2020-05-17 ENCOUNTER — PATIENT MESSAGE (OUTPATIENT)
Dept: NEUROLOGY | Facility: CLINIC | Age: 35
End: 2020-05-17

## 2020-05-28 ENCOUNTER — INFUSION (OUTPATIENT)
Dept: INFUSION THERAPY | Facility: OTHER | Age: 35
End: 2020-05-28
Attending: INTERNAL MEDICINE
Payer: COMMERCIAL

## 2020-05-28 VITALS
SYSTOLIC BLOOD PRESSURE: 100 MMHG | HEART RATE: 76 BPM | WEIGHT: 144.81 LBS | RESPIRATION RATE: 18 BRPM | BODY MASS INDEX: 24.12 KG/M2 | OXYGEN SATURATION: 99 % | DIASTOLIC BLOOD PRESSURE: 55 MMHG | TEMPERATURE: 98 F | HEIGHT: 65 IN

## 2020-05-28 DIAGNOSIS — G35 MULTIPLE SCLEROSIS: Primary | ICD-10-CM

## 2020-05-28 PROCEDURE — 63600175 PHARM REV CODE 636 W HCPCS: Performed by: PSYCHIATRY & NEUROLOGY

## 2020-05-28 PROCEDURE — 96365 THER/PROPH/DIAG IV INF INIT: CPT

## 2020-05-28 PROCEDURE — 96367 TX/PROPH/DG ADDL SEQ IV INF: CPT

## 2020-05-28 PROCEDURE — S0028 INJECTION, FAMOTIDINE, 20 MG: HCPCS | Performed by: PSYCHIATRY & NEUROLOGY

## 2020-05-28 PROCEDURE — 96375 TX/PRO/DX INJ NEW DRUG ADDON: CPT

## 2020-05-28 PROCEDURE — 96366 THER/PROPH/DIAG IV INF ADDON: CPT

## 2020-05-28 PROCEDURE — 25000003 PHARM REV CODE 250: Performed by: PSYCHIATRY & NEUROLOGY

## 2020-05-28 RX ORDER — EPINEPHRINE 0.3 MG/.3ML
0.3 INJECTION SUBCUTANEOUS
Status: DISCONTINUED | OUTPATIENT
Start: 2020-05-28 | End: 2020-05-28 | Stop reason: HOSPADM

## 2020-05-28 RX ORDER — SODIUM CHLORIDE 0.9 % (FLUSH) 0.9 %
10 SYRINGE (ML) INJECTION
Status: DISCONTINUED | OUTPATIENT
Start: 2020-05-28 | End: 2020-05-28 | Stop reason: HOSPADM

## 2020-05-28 RX ORDER — EPINEPHRINE 0.3 MG/.3ML
0.3 INJECTION SUBCUTANEOUS
Status: CANCELLED | OUTPATIENT
Start: 2020-10-29

## 2020-05-28 RX ORDER — ACETAMINOPHEN 500 MG
1000 TABLET ORAL
Status: CANCELLED | OUTPATIENT
Start: 2020-10-29

## 2020-05-28 RX ORDER — HEPARIN 100 UNIT/ML
500 SYRINGE INTRAVENOUS
Status: DISCONTINUED | OUTPATIENT
Start: 2020-05-28 | End: 2020-05-28 | Stop reason: HOSPADM

## 2020-05-28 RX ORDER — FAMOTIDINE 10 MG/ML
20 INJECTION INTRAVENOUS
Status: CANCELLED | OUTPATIENT
Start: 2020-10-29

## 2020-05-28 RX ORDER — DIPHENHYDRAMINE HYDROCHLORIDE 50 MG/ML
50 INJECTION INTRAMUSCULAR; INTRAVENOUS
Status: DISCONTINUED | OUTPATIENT
Start: 2020-05-28 | End: 2020-05-28 | Stop reason: HOSPADM

## 2020-05-28 RX ORDER — DIPHENHYDRAMINE HYDROCHLORIDE 50 MG/ML
50 INJECTION INTRAMUSCULAR; INTRAVENOUS
Status: CANCELLED | OUTPATIENT
Start: 2020-10-29

## 2020-05-28 RX ORDER — ACETAMINOPHEN 500 MG
1000 TABLET ORAL
Status: COMPLETED | OUTPATIENT
Start: 2020-05-28 | End: 2020-05-28

## 2020-05-28 RX ORDER — FAMOTIDINE 10 MG/ML
20 INJECTION INTRAVENOUS
Status: COMPLETED | OUTPATIENT
Start: 2020-05-28 | End: 2020-05-28

## 2020-05-28 RX ORDER — SODIUM CHLORIDE 0.9 % (FLUSH) 0.9 %
10 SYRINGE (ML) INJECTION
Status: CANCELLED | OUTPATIENT
Start: 2020-10-29

## 2020-05-28 RX ORDER — HEPARIN 100 UNIT/ML
500 SYRINGE INTRAVENOUS
Status: CANCELLED | OUTPATIENT
Start: 2020-10-29

## 2020-05-28 RX ADMIN — FAMOTIDINE 20 MG: 10 INJECTION, SOLUTION INTRAVENOUS at 09:05

## 2020-05-28 RX ADMIN — ACETAMINOPHEN 1000 MG: 500 TABLET, FILM COATED ORAL at 09:05

## 2020-05-28 RX ADMIN — DIPHENHYDRAMINE HYDROCHLORIDE 50 MG: 50 INJECTION INTRAMUSCULAR; INTRAVENOUS at 09:05

## 2020-05-28 RX ADMIN — DEXTROSE: 50 INJECTION, SOLUTION INTRAVENOUS at 09:05

## 2020-05-28 RX ADMIN — OCRELIZUMAB 300 MG: 300 INJECTION INTRAVENOUS at 10:05

## 2020-05-28 RX ADMIN — SODIUM CHLORIDE: 0.9 INJECTION, SOLUTION INTRAVENOUS at 09:05

## 2020-05-28 NOTE — PLAN OF CARE
Ocrevus infusion complete. Pt tolerated well. VSS. NAD. IV to left wrist DC'd.  Pt verbalized understanding of discharge instructions before leaving with self.

## 2020-06-09 ENCOUNTER — TELEPHONE (OUTPATIENT)
Dept: NEUROLOGY | Facility: CLINIC | Age: 35
End: 2020-06-09

## 2020-07-15 RX ORDER — DROSPIRENONE AND ETHINYL ESTRADIOL TABLETS 0.02-3(28)
1 KIT ORAL DAILY
Qty: 90 TABLET | Refills: 0 | Status: SHIPPED | OUTPATIENT
Start: 2020-07-15 | End: 2020-09-14 | Stop reason: SDUPTHER

## 2020-07-24 ENCOUNTER — TELEPHONE (OUTPATIENT)
Dept: OBSTETRICS AND GYNECOLOGY | Facility: CLINIC | Age: 35
End: 2020-07-24

## 2020-07-24 NOTE — TELEPHONE ENCOUNTER
Contacted pt to reschedule WWE due to provider being out of office, pt did not answer. LVM with call back number.

## 2020-08-19 ENCOUNTER — OFFICE VISIT (OUTPATIENT)
Dept: NEUROLOGY | Facility: CLINIC | Age: 35
End: 2020-08-19
Payer: COMMERCIAL

## 2020-08-19 VITALS
WEIGHT: 145.31 LBS | DIASTOLIC BLOOD PRESSURE: 71 MMHG | SYSTOLIC BLOOD PRESSURE: 106 MMHG | HEIGHT: 65 IN | TEMPERATURE: 98 F | HEART RATE: 66 BPM | BODY MASS INDEX: 24.21 KG/M2

## 2020-08-19 DIAGNOSIS — D84.9 IMMUNOSUPPRESSION: ICD-10-CM

## 2020-08-19 DIAGNOSIS — G35 MULTIPLE SCLEROSIS: ICD-10-CM

## 2020-08-19 DIAGNOSIS — E55.9 VITAMIN D DEFICIENCY: Primary | ICD-10-CM

## 2020-08-19 DIAGNOSIS — Z71.89 COUNSELING REGARDING GOALS OF CARE: ICD-10-CM

## 2020-08-19 PROCEDURE — 99215 OFFICE O/P EST HI 40 MIN: CPT | Mod: S$GLB,,, | Performed by: PSYCHIATRY & NEUROLOGY

## 2020-08-19 PROCEDURE — 99215 PR OFFICE/OUTPT VISIT, EST, LEVL V, 40-54 MIN: ICD-10-PCS | Mod: S$GLB,,, | Performed by: PSYCHIATRY & NEUROLOGY

## 2020-08-19 PROCEDURE — 3008F PR BODY MASS INDEX (BMI) DOCUMENTED: ICD-10-PCS | Mod: CPTII,S$GLB,, | Performed by: PSYCHIATRY & NEUROLOGY

## 2020-08-19 PROCEDURE — 3008F BODY MASS INDEX DOCD: CPT | Mod: CPTII,S$GLB,, | Performed by: PSYCHIATRY & NEUROLOGY

## 2020-08-19 PROCEDURE — 99999 PR PBB SHADOW E&M-EST. PATIENT-LVL III: ICD-10-PCS | Mod: PBBFAC,,, | Performed by: PSYCHIATRY & NEUROLOGY

## 2020-08-19 PROCEDURE — 99999 PR PBB SHADOW E&M-EST. PATIENT-LVL III: CPT | Mod: PBBFAC,,, | Performed by: PSYCHIATRY & NEUROLOGY

## 2020-08-19 NOTE — Clinical Note
Hi D, pt would like to restart counseling with you; she understands you cannot take on new clients right now, so just per her on waiting list; thanks

## 2020-08-19 NOTE — PROGRESS NOTES
"Subjective:          Patient ID: King Perez is a 35 y.o. female who presents today for a routine clinic visit for MS.      MS HPI:  · DMT: ocrelizumab  First infusions started in May  · Side effects from DMT? Yes - mild reaction during infusion; next day nausea;  Second half of infusion was better;  · Taking vitamin D3 as recommended? Yes  · She had a "ton" of hair loss;  Better now;   · Fatigue is much improved; heat sensitivity is improved.   · She broke her fibula in March--not MS related.   · She is running for exercise;   · Having joint pain since Ocrevus--hands and feet; not stopping her from working;  Overall pt likes Ocrevus b/c much less brain fog.   · Plans for a baby are on hold right now;      Medications:  Current Outpatient Medications   Medication Sig    ascorbic acid, vitamin C, (VITAMIN C) 500 MG tablet Take 500 mg by mouth once daily.    B complex-C-E-Zn Tab Take 1 tablet by mouth once daily.    LORYNA, 28, 3-0.02 mg per tablet Take 1 tablet by mouth once daily.    vitamin D 1000 units Tab Take 5,000 Units by mouth once daily.     interferon beta-1a, albumin, (REBIF REBIDOSE) 44 mcg/0.5 mL PnIj Inject 44 mcg into the skin 3 (three) times a week.     No current facility-administered medications for this visit.        SOCIAL HISTORY  Social History     Tobacco Use    Smoking status: Never Smoker    Smokeless tobacco: Never Used   Substance Use Topics    Alcohol use: Yes     Comment: socially    Drug use: No       Living arrangements - the patient lives with their spouse.    REVIEW OF SYMPTOMS 8/17/2020   Do you feel abnormally tired on most days? No   Do you feel you generally sleep well? No--insomnia; wakes at 3am;     Do you have difficulty controlling your bladder?  No   Do you have difficulty controlling your bowels?  No   Do you have frequent muscle cramps, tightness or spasms in your limbs?  No   Do you have new visual symptoms?  Yes--has floaters in her vision; seeing " ophtho   Do you have worsening difficulty with your memory or thinking? No   Do you have worsening symptoms of anxiety or depression?  No   For patients who walk, Do you have more difficulty walking?  No   Have you fallen since your last visit?  No   For patients who use wheelchairs: Do you have any skin wounds or breakdown? No   Do you have difficulty using your hands?  No   Do you have shooting or burning pain? No   Do you have difficulty with sexual function?  No   If you are sexually active, are you using birth control? Y/N  N/A Yes   Do you often choke when swallowing liquids or solid food?  No   Do you experience worsening symptoms when overheated? Yes   Do you need any new equipment such as a wheelchair, walker or shower chair? No   Do you receive co-pay financial assistance for your principal MS medicine? Yes   Would you be interested in participating in an MS research trial in the future? Yes   For patients on Gilenya, Tecfidera, Aubagio, Rituxan, Ocrevus, Tysabri, Lemtrada or Methotrexate, are you aware that you should NOT receive live virus vaccines?  Yes   Do you feel you have adequate family/friend support?  No   Do you have health insurance?   Yes   Are you currently employed? Yes   Do you receive SSDI/SSI?  Not Applicable   Do you use marijuana or cannabis products? Yes   How often? Weekly--doing for joint pain which began after Ocrevus;    Have you been diagnosed with a urinary tract infection since your last visit here? No   Have you been diagnosed with a respiratory tract infection since your last visit here? No   Have you been to the emergency room since your last visit here? No   Have you been hospitalized since your last visit here?  No            Objective:        25 foot timed walk:  4.3s without assist;   No flowsheet data found.      Neurologic Exam  MENTAL STATUS: grossly intact  CRANIAL NERVE EXAM: There is no internuclear ophthalmoplegia. Extraocular   muscles are intact.  No facial    asymmetry.There is no dysarthria.   MOTOR EXAM: Normal bulk and tone throughout UE and LE bilaterally. Rapid sequential movements are normal. Strength is 5/5 in all groups   in the lower extremities and upper extremities.   REFLEXES: Symmetric and 1+ throughout in all 4 extremities.   SENSORY EXAM: Normal to vibration t/o  COORDINATION: Normal finger-to-nose exam.   GAIT: Narrow based and stable.      Imaging:     Results for orders placed during the hospital encounter of 11/25/19   MRI Brain Demyelinating W W/O Contrast    Impression 1. In this patient with a reported history of multiple sclerosis, there is a new lesion within the subcortical white matter of the left temporal lobe with associated punctate focus of enhancement concerning for active demyelination.  This report was flagged in Epic as abnormal.    Electronically signed by resident: Barron Johnson  Date:    11/25/2019  Time:    11:26    Electronically signed by: Chris Shaffer MD  Date:    11/25/2019  Time:    15:51     Results for orders placed during the hospital encounter of 11/26/18   MRI Cervical Spine Demyelinating W W/O Contrast    Impression Continued multiple short segment foci of T2 stir signal hyperintensities within the cervical and thoracic cord in light of history concerning for prior areas of demyelination.    No evidence for definite new cord lesion or abnormal intrathecal enhancement to suggest significant interval or active demyelination.    Please see above for additional details      Electronically signed by: Dennis Palmer DO  Date:    11/26/2018  Time:    16:23     Results for orders placed during the hospital encounter of 11/26/18   MRI Thoracic Spine Demyelinating W W/O Contrast    Impression Continued multiple short segment foci of T2 stir signal hyperintensities within the cervical and thoracic cord in light of history concerning for prior areas of demyelination.    No evidence for definite new cord lesion or abnormal  intrathecal enhancement to suggest significant interval or active demyelination.    Please see above for additional details      Electronically signed by: Dennis Palmer DO  Date:    11/26/2018  Time:    16:23         Labs:     Lab Results   Component Value Date    LPUTJUPW37ZX 58 11/20/2019    MIFBBDOK15CJ 84 04/30/2019    CAXROTPN72IV 50 11/12/2018     Lab Results   Component Value Date    JCVINDEX 1.52 (A) 02/06/2020    JCVANTIBODY Positive (A) 02/06/2020     Lab Results   Component Value Date    CQ5ICKIJ 73.8 02/06/2020    ABSOLUTECD3 1007.0 02/06/2020    MC2FSIRW 17.1 02/06/2020    ABSOLUTECD8 234.0 02/06/2020    JM5LUMQE 55.7 02/06/2020    ABSOLUTECD4 761.0 02/06/2020    LABCD48 3.25 02/06/2020     Lab Results   Component Value Date    WBC 7.27 03/09/2020    RBC 3.22 (L) 03/09/2020    HGB 10.7 (L) 03/09/2020    HCT 32.6 (L) 03/09/2020     (H) 03/09/2020    MCH 33.2 (H) 03/09/2020    MCHC 32.8 03/09/2020    RDW 11.9 03/09/2020     03/09/2020    MPV 10.5 03/09/2020    GRAN 3.6 03/09/2020    GRAN 48.8 03/09/2020    LYMPH 3.0 03/09/2020    LYMPH 41.8 03/09/2020    MONO 0.5 03/09/2020    MONO 6.6 03/09/2020    EOS 0.1 03/09/2020    BASO 0.02 03/09/2020    EOSINOPHIL 1.8 03/09/2020    BASOPHIL 0.3 03/09/2020     Sodium   Date Value Ref Range Status   02/06/2020 139 136 - 145 mmol/L Final     Potassium   Date Value Ref Range Status   02/06/2020 4.2 3.5 - 5.1 mmol/L Final     Chloride   Date Value Ref Range Status   02/06/2020 106 95 - 110 mmol/L Final     CO2   Date Value Ref Range Status   02/06/2020 24 23 - 29 mmol/L Final     Glucose   Date Value Ref Range Status   02/06/2020 89 70 - 110 mg/dL Final     BUN, Bld   Date Value Ref Range Status   02/06/2020 7 6 - 20 mg/dL Final     Creatinine   Date Value Ref Range Status   02/06/2020 0.7 0.5 - 1.4 mg/dL Final     Calcium   Date Value Ref Range Status   02/06/2020 8.9 8.7 - 10.5 mg/dL Final     Total Protein   Date Value Ref Range Status   02/06/2020 7.4  6.0 - 8.4 g/dL Final     Albumin   Date Value Ref Range Status   02/06/2020 3.7 3.5 - 5.2 g/dL Final     Total Bilirubin   Date Value Ref Range Status   02/06/2020 0.5 0.1 - 1.0 mg/dL Final     Comment:     For infants and newborns, interpretation of results should be based  on gestational age, weight and in agreement with clinical  observations.  Premature Infant recommended reference ranges:  Up to 24 hours.............<8.0 mg/dL  Up to 48 hours............<12.0 mg/dL  3-5 days..................<15.0 mg/dL  6-29 days.................<15.0 mg/dL       Alkaline Phosphatase   Date Value Ref Range Status   02/06/2020 48 (L) 55 - 135 U/L Final     AST   Date Value Ref Range Status   02/06/2020 23 10 - 40 U/L Final     ALT   Date Value Ref Range Status   02/06/2020 20 10 - 44 U/L Final     Anion Gap   Date Value Ref Range Status   02/06/2020 9 8 - 16 mmol/L Final     eGFR if    Date Value Ref Range Status   02/06/2020 >60.0 >60 mL/min/1.73 m^2 Final     eGFR if non    Date Value Ref Range Status   02/06/2020 >60.0 >60 mL/min/1.73 m^2 Final     Comment:     Calculation used to obtain the estimated glomerular filtration  rate (eGFR) is the CKD-EPI equation.        Lab Results   Component Value Date    HEPBSAG Negative 02/06/2020    HEPBSAB Positive (A) 02/06/2020    HEPBCAB Negative 02/06/2020           MS Impression and Plan:     NEURO MULTIPLE SCLEROSIS IMPRESSION:   MS Status:     Number of relapses in the past year?:  0    Clinical Progression:  Clinically Stable    MRI Progression:  N/A  Plan:     DMT:  No change in management    DMT comment:  Continue Ocrevus for now; she has mild joint pain since starting Ocrevus which I suspect may be a side effect. For now, she states this issue is minimal and she likes the way she feels overall on Ocrevus; will stay the course    Symptom Management:  No change in symptom management     Next Imaging Due: 10/24/2020     Next Labs Due: 10/24/2020      We discussed her immunosuppressed status and the risk of increased form of illness should she contract COVID-19 and pt expressed understanding.  The importance of social distancing, extra precautions as recommended by NMSS, and hygiene guidelines were emphasized.         F/u Margie Fierro [facility] in 6 months      Our visit today lasted 40 minutes, and 100% of this time was spent face to face with the patient. Over 50% of this visit included discussion of the treatment plan/medication changes/symptom management/exam findings/imaging results/coordination of care. The patient agrees with the plan of care.    Problem List Items Addressed This Visit        1 - High    Multiple sclerosis    Relevant Orders    MRI Brain Demyelinating W W/O Contrast    CBC auto differential    Rituxan Sensitivity    Hepatitis B Core Antibody, Total    Hepatitis B Surface Ab, Qualitative    Hepatitis B Surface Antigen       Unprioritized    Immunosuppression      Other Visit Diagnoses     Vitamin D deficiency    -  Primary    Relevant Orders    Vitamin D    Counseling regarding goals of care              Marci Hernandez MD

## 2020-08-19 NOTE — Clinical Note
Assuming labs and MRI look okay in October, we are on track for her next infusion in November;  Stephanie or Bryan;

## 2020-08-24 PROBLEM — D84.9 IMMUNOSUPPRESSION: Status: ACTIVE | Noted: 2020-08-24

## 2020-08-27 ENCOUNTER — TELEPHONE (OUTPATIENT)
Dept: NEUROLOGY | Facility: CLINIC | Age: 35
End: 2020-08-27

## 2020-08-27 DIAGNOSIS — G35 MS (MULTIPLE SCLEROSIS): Primary | ICD-10-CM

## 2020-08-27 DIAGNOSIS — F43.20 ADJUSTMENT DISORDER, UNSPECIFIED TYPE: ICD-10-CM

## 2020-09-13 ENCOUNTER — PATIENT OUTREACH (OUTPATIENT)
Dept: ADMINISTRATIVE | Facility: OTHER | Age: 35
End: 2020-09-13

## 2020-09-14 ENCOUNTER — OFFICE VISIT (OUTPATIENT)
Dept: PSYCHIATRY | Facility: CLINIC | Age: 35
End: 2020-09-14
Payer: COMMERCIAL

## 2020-09-14 ENCOUNTER — OFFICE VISIT (OUTPATIENT)
Dept: OBSTETRICS AND GYNECOLOGY | Facility: CLINIC | Age: 35
End: 2020-09-14
Payer: COMMERCIAL

## 2020-09-14 VITALS — BODY MASS INDEX: 24.61 KG/M2 | HEIGHT: 65 IN | WEIGHT: 147.69 LBS

## 2020-09-14 DIAGNOSIS — F32.0 CURRENT MILD EPISODE OF MAJOR DEPRESSIVE DISORDER WITHOUT PRIOR EPISODE: Primary | ICD-10-CM

## 2020-09-14 DIAGNOSIS — F41.9 ANXIETY: ICD-10-CM

## 2020-09-14 DIAGNOSIS — Z30.41 ENCOUNTER FOR SURVEILLANCE OF CONTRACEPTIVE PILLS: Primary | ICD-10-CM

## 2020-09-14 DIAGNOSIS — F32.A ANXIETY AND DEPRESSION: ICD-10-CM

## 2020-09-14 DIAGNOSIS — Z01.419 WELL WOMAN EXAM WITH ROUTINE GYNECOLOGICAL EXAM: ICD-10-CM

## 2020-09-14 DIAGNOSIS — F41.9 ANXIETY AND DEPRESSION: ICD-10-CM

## 2020-09-14 DIAGNOSIS — G35 MS (MULTIPLE SCLEROSIS): ICD-10-CM

## 2020-09-14 PROCEDURE — 99395 PREV VISIT EST AGE 18-39: CPT | Mod: S$GLB,,, | Performed by: NURSE PRACTITIONER

## 2020-09-14 PROCEDURE — 90837 PR PSYCHOTHERAPY W/PATIENT, 60 MIN: ICD-10-PCS | Mod: 95,,, | Performed by: SOCIAL WORKER

## 2020-09-14 PROCEDURE — 99395 PR PREVENTIVE VISIT,EST,18-39: ICD-10-PCS | Mod: S$GLB,,, | Performed by: NURSE PRACTITIONER

## 2020-09-14 PROCEDURE — 90837 PSYTX W PT 60 MINUTES: CPT | Mod: 95,,, | Performed by: SOCIAL WORKER

## 2020-09-14 PROCEDURE — 99999 PR PBB SHADOW E&M-EST. PATIENT-LVL III: ICD-10-PCS | Mod: PBBFAC,,, | Performed by: NURSE PRACTITIONER

## 2020-09-14 PROCEDURE — 99999 PR PBB SHADOW E&M-EST. PATIENT-LVL III: CPT | Mod: PBBFAC,,, | Performed by: NURSE PRACTITIONER

## 2020-09-14 RX ORDER — SERTRALINE HYDROCHLORIDE 25 MG/1
25 TABLET, FILM COATED ORAL DAILY
Qty: 30 TABLET | Refills: 6 | Status: SHIPPED | OUTPATIENT
Start: 2020-09-14 | End: 2021-03-24 | Stop reason: SDUPTHER

## 2020-09-14 RX ORDER — DROSPIRENONE AND ETHINYL ESTRADIOL TABLETS 0.02-3(28)
1 KIT ORAL DAILY
Qty: 90 TABLET | Refills: 3 | Status: SHIPPED | OUTPATIENT
Start: 2020-09-14 | End: 2021-02-16 | Stop reason: SDUPTHER

## 2020-09-14 NOTE — PROGRESS NOTES
CC: Annual  HPI: Pt is a 35 y.o.  female who presents for routine annual exam. She uses OCPs for contraception. She does not want STD screening. Changed medications for MS-now getting Ocrevus infusions every 6 months. Next injection is in November. Overall likes it but has noticed an increase of her depression/anxiety. History of anxiety as an adult-has a therapist and is seeing her today. She has never been on any antidepressants before but is open to the idea. Feels like she is very emotional- has noticed increased anxiety. Pregnancy is still on hold-needs refill of OCPs. C/o low sex drive. Last seen by me in 2019 for breast pain-never got imaging but pain resolved.     FH:   Breast cancer: paternal grandmother-dx in her 60s  Colon cancer: none  Ovarian cancer: none  Uterine cancer: none    Flu vaccine: na  HPV vaccine: no  Last pap smear: 2019, HPV negative  Colonoscopy: na  DEXA: na  Mammogram: na  STD history: na  Birth control: OCPs  OB history:   Tobacco use: no    ROS:  GENERAL: Feeling well overall. Denies fever or chills.   SKIN: Denies rash or lesions.   HEAD: Denies head injury or headache.   NODES: Denies enlarged lymph nodes.   CHEST: Denies chest pain or shortness of breath.   CARDIOVASCULAR: Denies palpitations or left sided chest pain.   ABDOMEN: No abdominal pain, constipation, diarrhea, nausea, vomiting or rectal bleeding.   URINARY: No dysuria, hematuria, or burning on urination.  REPRODUCTIVE: See HPI.   BREASTS: Denies pain, lumps, or nipple discharge.   HEMATOLOGIC: No easy bruisability or excessive bleeding.   MUSCULOSKELETAL: Denies swelling. Joint pain from ocrevus  NEUROLOGIC: Denies syncope or weakness.   PSYCHIATRIC: Positive depression, anxiety, and mood swings    PE:   APPEARANCE: Well nourished, well developed, White female in no acute distress.  NODES: no cervical, supraclavicular, or inguinal lymphadenopathy  BREASTS: Symmetrical, no skin changes or visible lesions. No  palpable masses, nipple discharge or adenopathy bilaterally.  ABDOMEN: Soft. No tenderness or masses. No distention. No hernias palpated. No CVA tenderness.  VULVA: No lesions. Normal external female genitalia.  URETHRAL MEATUS: Normal size and location, no lesions, no prolapse.  URETHRA: No masses, tenderness, or prolapse.  VAGINA: Moist. No lesions or lacerations noted. No abnormal discharge present. No odor present.   CERVIX: No lesions or discharge. No cervical motion tenderness.   UTERUS: Normal size, regular shape, mobile, non-tender.  ADNEXA: No tenderness. No fullness or masses palpated in the adnexal regions.   ANUS PERINEUM: Normal.      Diagnosis:  1. Encounter for surveillance of contraceptive pills    2. Well woman exam with routine gynecological exam    3. Anxiety and depression        Plan:     Orders Placed This Encounter    LORYNA, 28, 3-0.02 mg per tablet       1. Discussed possible SE of OCPs is depression/mood swings and possible decrease in sex drive, however since she has been on OCPs long term and this is a new symptom, it is unlikely from the birth control.   2. Reviewed antidepressants-may be interested in starting Zoloft. Will speak with therapist today and get back with me.   3. Pap current  Patient was counseled today on the new ACS guidelines for cervical cytology screening as well as the current recommendations for breast cancer screening. She was counseled to follow up with her PCP for other routine health maintenance. Counseling session lasted approximately 15 minutes, and all her questions were answered.    Follow-up with me in 1 year for routine exam; pap in 2 years.

## 2020-09-14 NOTE — PROGRESS NOTES
Psychiatry Initial Visit (PhD/LCSW)  Diagnostic Interview - CPT 63344    Date: 9/14/2020    Site: [facility] (LCSW present in her home office, working remotely for [facility] at [facility])  The patient location is: home in [place]  The chief complaint leading to consultation is: depression, anxiety    Visit type: audiovisual    Face to Face time with patient: 60 mins   80 minutes of total time spent on the encounter, which includes face to face time and non-face to face time preparing to see the patient (eg, review of tests), Obtaining and/or reviewing separately obtained history, Documenting clinical information in the electronic or other health record, Independently interpreting results (not separately reported) and communicating results to the patient/family/caregiver, or Care coordination (not separately reported).     Each patient to whom he or she provides medical services by telemedicine is:  (1) informed of the relationship between the physician and patient and the respective role of any other health care provider with respect to management of the patient; and (2) notified that he or she may decline to receive medical services by telemedicine and may withdraw from such care at any time.      Referral source: Marci Hernandez MD of [facility]    Clinical status of patient: Outpatient    King Perez, a 35 y.o. female, for initial evaluation visit.  Met with patient and obtained additional information from a review of available medical records.    Chief complaint/reason for encounter: depression and anxiety    History of present illness: King is know to this SW from a previous treatment episode from 1/29/18 - 4/8/18 when she was treated for an adjustment disorder with anxiety.  Today, she returns to counseling with symptoms of depressed mood beginning in August, including low/sad mood nearly everyday; frequent tearfulness; increased irritability;  "increased fatigue and decreased interest in activities; disrupted sleep (waking up at 3:30-4 am, daily); feeling ashamed about her sadness.  She wonders if her Ocrevus, initiated in May/June 2020, is affecting her mood.  She also admits to being overwhelmed by the emotions of others, who are not dealing well with the Covid-19 pandemic.  She feels like she is "absorbing" their emotions.  She describes ongoing adjustment to her diagnosis of multiple sclerosis and has unhelpful thoughts that she is "a burden to others" and that she "won't be enough" for her friends, , etc.  She also has fears that "nothing is going to work" when it comes to her MS and that she will progress to the point of disability.      Pain: King does experience some intermittent joint pain.      Symptoms:   · Mood: depressed mood, diminished interest, fatigue, worthlessness/guilt, altered libido, tearfulness and difficulty staying asleep  · Anxiety: excessive anxiety/worry and irritability  · Substance abuse: denied  · Cognitive functioning: denied  · Health behaviors: noncontributory    Psychiatric history: has participated in counseling/psychotherapy on an outpatient basis in the past and is considering an anti-depressant (Lexapro or Zoloft) as recommended by her gynecologist.    Medical history: King was diagnosed with multiple sclerosis in 2015.  She has taken Copaxone, Rebif, Avonex and is currently on Ocrevus.      Family history of psychiatric illness: Mother with alcohol abuse following the death of her own mother.  Sober for years.  No other known family history of psychiatric illness    Social history (marriage, employment, etc.):   Previous social history from 1/2018, taken by this LCSW: King was born and raised in the [place] area.  She is an only child and lived with her parents until they  when she was ~ 16 y.o.  Both parents have remarried.  King describes a good childhood and close relationships with both " parents.  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  She and her  have been  for 12 years.  They lived outside [place] for a few years but have been living locally for the past 10 years.  They do not have children and lost a child to miscarriage about 5 years ago.  This was difficult as King describes how, though unexpected, they were excited.  She described losing friends during that time as they did not know how to support her through the experience.  King and her  have two dogs and enjoying spending a lot of time together.  Additionally, King practices yoga and enjoys running.  She also enjoys reading and other hobbies but has put them aside since she opened her salon.  (She expects her schedule to lighten beginning in March).  King was raised Hinduism but considers herself spiritual and not adherent to any particular Sabianism practice.  She enjoys journaling    Today, King reports that she has maintained her own salon since our last treatment encounter.  She also purchased a home.  She remains  to her  and they do not have children.  She continues to practice yoga, meditation, and daily journaling.      Substance use:   Alcohol: social   Drugs: previously used marijuana occasionally for MS symptoms, current use not assessed   Tobacco: none   Caffeine: not assessed    Current medications and drug reactions (include OTC, herbal): see medication list     Strengths and liabilities: Strength: Patient accepts guidance/feedback, Strength: Patient is expressive/articulate., Strength: Patient is intelligent., Strength: Patient has positive support network., Strength: Patient has reasonable judgment., Strength: Patient is stable.    Current Evaluation:     Mental Status Exam:  General Appearance:  age appropriate, neatly groomed   Speech: normal tone, normal rate, normal  pitch, normal volume      Level of Cooperation: cooperative      Thought Processes: normal and logical   Mood: depressed      Thought Content: normal, no suicidality, no homicidality, delusions, or paranoia   Affect: congruent and appropriate   Orientation: Oriented x3   Memory: intact   Attention Span & Concentration: intact   Fund of General Knowledge: intact and appropriate to age and level of education   Abstract Reasoning: intact   Judgment & Insight: good     Language  intact     Diagnostic Impression - Plan:       ICD-10-CM ICD-9-CM   1. Current mild episode of major depressive disorder without prior episode  F32.0 296.21   2. MS (multiple sclerosis)  G35 340   3. Anxiety  F41.9 300.00       Plan:individual psychotherapy and medication management by physician    Return to Clinic: 1 week    Length of Service (minutes): 60

## 2020-09-21 ENCOUNTER — PATIENT MESSAGE (OUTPATIENT)
Dept: PSYCHIATRY | Facility: CLINIC | Age: 35
End: 2020-09-21

## 2020-09-21 ENCOUNTER — OFFICE VISIT (OUTPATIENT)
Dept: PSYCHIATRY | Facility: CLINIC | Age: 35
End: 2020-09-21
Payer: COMMERCIAL

## 2020-09-21 DIAGNOSIS — G35 MS (MULTIPLE SCLEROSIS): ICD-10-CM

## 2020-09-21 DIAGNOSIS — F41.9 ANXIETY: ICD-10-CM

## 2020-09-21 DIAGNOSIS — F32.0 CURRENT MILD EPISODE OF MAJOR DEPRESSIVE DISORDER WITHOUT PRIOR EPISODE: Primary | ICD-10-CM

## 2020-09-21 PROCEDURE — 90834 PR PSYCHOTHERAPY W/PATIENT, 45 MIN: ICD-10-PCS | Mod: 95,,, | Performed by: SOCIAL WORKER

## 2020-09-21 PROCEDURE — 90834 PSYTX W PT 45 MINUTES: CPT | Mod: 95,,, | Performed by: SOCIAL WORKER

## 2020-09-21 NOTE — PROGRESS NOTES
"Individual Psychotherapy (PhD/LCSW)    9/21/2020    Site:  [facility]       The patient location is: home in [place]  The chief complaint leading to consultation is: depression, anxiety    Visit type: audiovisual (20 mins), audio only (25 mins)     Face to Face time with patient: 20 mins spent in audiovisual, then [employer] internet service was disrupted, so we continued by phone for an additional 25 mins.  50 minutes of total time spent on the encounter, which includes face to face time and non-face to face time preparing to see the patient (eg, review of tests), Obtaining and/or reviewing separately obtained history, Documenting clinical information in the electronic or other health record, Independently interpreting results (not separately reported) and communicating results to the patient/family/caregiver, or Care coordination (not separately reported).       Each patient to whom he or she provides medical services by telemedicine is:  (1) informed of the relationship between the physician and patient and the respective role of any other health care provider with respect to management of the patient; and (2) notified that he or she may decline to receive medical services by telemedicine and may withdraw from such care at any time.    Therapeutic Intervention: Met with patient.  Outpatient - Behavior modifying psychotherapy 45 min - CPT code 41645    Chief complaint/reason for encounter: depression and anxiety     Interval history and content of current session: King arrived to session on time.  She was casually dressed with good hygiene.  She enjoyed a restful weekend at the beach with her  and co-workers.  She believes this helped her mood "a little".  She started Zoloft today and continues to have concerns about "these types of medications" because of her family history of addiction.  SW explained that Zoloft is not considered a habit-forming medication, but acknowledged her concerns about how it " "affects the brain.  King has already made positive changes to better manage work-related stress, including saying no to working late and waking up earlier to have time for herself in the mornings.  She shared that she "lost" her meditation routine during these past few months because she found it difficult to turn her focus from anxious thoughts to the present moment.  SW encouraged King to consider small, simple ways to bring this practice back to her routine.  We also explored some of her unhelpful thoughts (labeling, making assumptions, magnification) and SW reviewed previously taught CBT skills for examining unhelpful thoughts. Will send her the CBT thought log and list of unhelpful thoughts.      Treatment plan:  · Target symptoms: depression, anxiety   · Why chosen therapy is appropriate versus another modality: relevant to diagnosis, patient responds to this modality  · Outcome monitoring methods: self-report, observation  · Therapeutic intervention type: behavior modifying psychotherapy    Risk parameters:  Patient reports no suicidal ideation  Patient reports no homicidal ideation  Patient reports no self-injurious behavior  Patient reports no violent behavior    Verbal deficits: None    Patient's response to intervention:  The patient's response to intervention is accepting.    Progress toward goals and other mental status changes:  The patient's progress toward goals is fair .    Diagnosis:     ICD-10-CM ICD-9-CM   1. Current mild episode of major depressive disorder without prior episode  F32.0 296.21   2. Anxiety  F41.9 300.00   3. MS (multiple sclerosis)  G35 340       Plan:  individual psychotherapy and medication management by physician    Return to clinic: 2 weeks    Length of Service (minutes): 45      "

## 2020-09-22 ENCOUNTER — PATIENT MESSAGE (OUTPATIENT)
Dept: NEUROLOGY | Facility: CLINIC | Age: 35
End: 2020-09-22

## 2020-09-29 ENCOUNTER — PATIENT MESSAGE (OUTPATIENT)
Dept: NEUROLOGY | Facility: CLINIC | Age: 35
End: 2020-09-29

## 2020-10-07 ENCOUNTER — OFFICE VISIT (OUTPATIENT)
Dept: PSYCHIATRY | Facility: CLINIC | Age: 35
End: 2020-10-07
Payer: COMMERCIAL

## 2020-10-07 DIAGNOSIS — G35 MS (MULTIPLE SCLEROSIS): ICD-10-CM

## 2020-10-07 DIAGNOSIS — F41.9 ANXIETY: ICD-10-CM

## 2020-10-07 DIAGNOSIS — F32.0 CURRENT MILD EPISODE OF MAJOR DEPRESSIVE DISORDER WITHOUT PRIOR EPISODE: Primary | ICD-10-CM

## 2020-10-07 PROCEDURE — 90834 PSYTX W PT 45 MINUTES: CPT | Mod: 95,,, | Performed by: SOCIAL WORKER

## 2020-10-07 PROCEDURE — 90834 PR PSYCHOTHERAPY W/PATIENT, 45 MIN: ICD-10-PCS | Mod: 95,,, | Performed by: SOCIAL WORKER

## 2020-10-07 NOTE — PROGRESS NOTES
"Individual Psychotherapy (PhD/LCSW)    10/7/2020    Site:  [facility]       The patient location is: home in [place]  The chief complaint leading to consultation is: depression, anxiety    Visit type: audiovisual     Face to Face time with patient: 45 mins  50 minutes of total time spent on the encounter, which includes face to face time and non-face to face time preparing to see the patient (eg, review of tests), Obtaining and/or reviewing separately obtained history, Documenting clinical information in the electronic or other health record, Independently interpreting results (not separately reported) and communicating results to the patient/family/caregiver, or Care coordination (not separately reported).       Each patient to whom he or she provides medical services by telemedicine is:  (1) informed of the relationship between the physician and patient and the respective role of any other health care provider with respect to management of the patient; and (2) notified that he or she may decline to receive medical services by telemedicine and may withdraw from such care at any time.    Therapeutic Intervention: Met with patient.  Outpatient - Behavior modifying psychotherapy 45 min - CPT code 64782    Chief complaint/reason for encounter: depression and anxiety     Interval history and content of current session: King arrived on time for session.  She was casually dressed and with adequate hygiene.  She states she's been doing pretty well, with improved mood (less crying, decreased anxiety, decreased sense of not deserving good things).  She is adhering to her new schedule, which allows her more time for herself, but she does have some concern that she won't stick to this new schedule.  Historically, she has given in to client demands, etc.  We explored her belief of "not deserving good things" and how this may influence her ability to maintain her boundaries.  King also opened up more to her  " about her beliefs and thoughts and found that his response helped her realize that these thoughts are not helpful and not based in facts.  She is using the CBT thought log to guide some of her journal entries and is becoming more aware of unhelpful thought patterns.      Treatment plan:  · Target symptoms: depression, anxiety   · Why chosen therapy is appropriate versus another modality: relevant to diagnosis, patient responds to this modality  · Outcome monitoring methods: self-report, observation  · Therapeutic intervention type: behavior modifying psychotherapy    Risk parameters:  Patient reports no suicidal ideation  Patient reports no homicidal ideation  Patient reports no self-injurious behavior  Patient reports no violent behavior    Verbal deficits: None    Patient's response to intervention:  The patient's response to intervention is accepting.    Progress toward goals and other mental status changes:  The patient's progress toward goals is good.    Diagnosis:     ICD-10-CM ICD-9-CM   1. Current mild episode of major depressive disorder without prior episode  F32.0 296.21   2. Anxiety  F41.9 300.00   3. MS (multiple sclerosis)  G35 340       Plan:  individual psychotherapy and medication management by physician    Return to clinic: 2 weeks    Length of Service (minutes): 45

## 2020-10-13 ENCOUNTER — PATIENT MESSAGE (OUTPATIENT)
Dept: NEUROLOGY | Facility: CLINIC | Age: 35
End: 2020-10-13

## 2020-10-19 ENCOUNTER — OFFICE VISIT (OUTPATIENT)
Dept: PSYCHIATRY | Facility: CLINIC | Age: 35
End: 2020-10-19
Payer: COMMERCIAL

## 2020-10-19 DIAGNOSIS — F41.9 ANXIETY: ICD-10-CM

## 2020-10-19 DIAGNOSIS — F32.0 CURRENT MILD EPISODE OF MAJOR DEPRESSIVE DISORDER WITHOUT PRIOR EPISODE: Primary | ICD-10-CM

## 2020-10-19 PROCEDURE — 90834 PSYTX W PT 45 MINUTES: CPT | Mod: 95,,, | Performed by: SOCIAL WORKER

## 2020-10-19 PROCEDURE — 90834 PR PSYCHOTHERAPY W/PATIENT, 45 MIN: ICD-10-PCS | Mod: 95,,, | Performed by: SOCIAL WORKER

## 2020-10-19 NOTE — PROGRESS NOTES
Individual Psychotherapy (PhD/LCSW)    10/19/2020    Site:  [facility]       The patient location is: home in [place]  The chief complaint leading to consultation is: depression, anxiety    Visit type: audiovisual     Face to Face time with patient: 45 mins  50 minutes of total time spent on the encounter, which includes face to face time and non-face to face time preparing to see the patient (eg, review of tests), Obtaining and/or reviewing separately obtained history, Documenting clinical information in the electronic or other health record, Independently interpreting results (not separately reported) and communicating results to the patient/family/caregiver, or Care coordination (not separately reported).       Each patient to whom he or she provides medical services by telemedicine is:  (1) informed of the relationship between the physician and patient and the respective role of any other health care provider with respect to management of the patient; and (2) notified that he or she may decline to receive medical services by telemedicine and may withdraw from such care at any time.    Therapeutic Intervention: Met with patient.  Outpatient - Behavior modifying psychotherapy 45 min - CPT code 05876    Chief complaint/reason for encounter: depression and anxiety     Interval history and content of current session: King arrived on time.  She presented with a bright affect (smiling and cheerful) and states she's been doing well.  She was busy with social activities, which she enjoyed, but she took some extra time off this weekend to have time for herself.  She has noticed decreased feelings of guilt and that it's been easier to work through these feelings when they surface.  As an example, she bought herself a new car and didn't experience overwhelming guilt about doing something nice for herself.  She believes that a few weeks ago this would have been impossible.  She realized that she's been very  anxious and depressed at times for many years, and experiences some sadness when she considers how things could have been different if she'd chosen to focus on these issues instead of avoiding them by keeping too busy.  She is sleeping well, now and is better able to utilize coping skills.  She would like to return to yoga, but is also waiting until she feels safer 2/2 Covid.  Agreed to send pt body scan links as she would like to utilize this practice.  SW introduced the concept of having relapses and on preventing them through increased awareness of behaviors, thoughts, attitudes, and old habits that may lead to increased anxiety and depression.  She recognizes that too little rest/down time contributes and we also discussed social isolation and noticing any increase in guilty thinking.  She will spend some time considering warning signs.      Treatment plan:  · Target symptoms: depression, anxiety   · Why chosen therapy is appropriate versus another modality: relevant to diagnosis, patient responds to this modality  · Outcome monitoring methods: self-report, observation  · Therapeutic intervention type: behavior modifying psychotherapy    Risk parameters:  Patient reports no suicidal ideation  Patient reports no homicidal ideation  Patient reports no self-injurious behavior  Patient reports no violent behavior    Verbal deficits: None    Patient's response to intervention:  The patient's response to intervention is accepting.    Progress toward goals and other mental status changes:  The patient's progress toward goals is good.    Diagnosis:     ICD-10-CM ICD-9-CM   1. Current mild episode of major depressive disorder without prior episode  F32.0 296.21   2. Anxiety  F41.9 300.00       Plan:  individual psychotherapy and medication management by physician    Return to clinic: 1 month    Length of Service (minutes): 45

## 2020-10-21 ENCOUNTER — LAB VISIT (OUTPATIENT)
Dept: LAB | Facility: HOSPITAL | Age: 35
End: 2020-10-21
Attending: PSYCHIATRY & NEUROLOGY
Payer: COMMERCIAL

## 2020-10-21 ENCOUNTER — PATIENT MESSAGE (OUTPATIENT)
Dept: PSYCHIATRY | Facility: CLINIC | Age: 35
End: 2020-10-21

## 2020-10-21 DIAGNOSIS — G35 MULTIPLE SCLEROSIS: ICD-10-CM

## 2020-10-21 DIAGNOSIS — E55.9 VITAMIN D DEFICIENCY: ICD-10-CM

## 2020-10-21 LAB
25(OH)D3+25(OH)D2 SERPL-MCNC: 59 NG/ML (ref 30–96)
BASOPHILS # BLD AUTO: 0.02 K/UL (ref 0–0.2)
BASOPHILS NFR BLD: 0.3 % (ref 0–1.9)
DIFFERENTIAL METHOD: ABNORMAL
EOSINOPHIL # BLD AUTO: 0 K/UL (ref 0–0.5)
EOSINOPHIL NFR BLD: 0.4 % (ref 0–8)
ERYTHROCYTE [DISTWIDTH] IN BLOOD BY AUTOMATED COUNT: 12.1 % (ref 11.5–14.5)
HCT VFR BLD AUTO: 36.8 % (ref 37–48.5)
HGB BLD-MCNC: 12.1 G/DL (ref 12–16)
IMM GRANULOCYTES # BLD AUTO: 0.02 K/UL (ref 0–0.04)
IMM GRANULOCYTES NFR BLD AUTO: 0.3 % (ref 0–0.5)
LYMPHOCYTES # BLD AUTO: 1.8 K/UL (ref 1–4.8)
LYMPHOCYTES NFR BLD: 27.2 % (ref 18–48)
MCH RBC QN AUTO: 34 PG (ref 27–31)
MCHC RBC AUTO-ENTMCNC: 32.9 G/DL (ref 32–36)
MCV RBC AUTO: 103 FL (ref 82–98)
MONOCYTES # BLD AUTO: 0.2 K/UL (ref 0.3–1)
MONOCYTES NFR BLD: 3.4 % (ref 4–15)
NEUTROPHILS # BLD AUTO: 4.6 K/UL (ref 1.8–7.7)
NEUTROPHILS NFR BLD: 68.4 % (ref 38–73)
NRBC BLD-RTO: 0 /100 WBC
PLATELET # BLD AUTO: 155 K/UL (ref 150–350)
PMV BLD AUTO: 10.9 FL (ref 9.2–12.9)
RBC # BLD AUTO: 3.56 M/UL (ref 4–5.4)
WBC # BLD AUTO: 6.77 K/UL (ref 3.9–12.7)

## 2020-10-21 PROCEDURE — 82306 VITAMIN D 25 HYDROXY: CPT

## 2020-10-21 PROCEDURE — 85025 COMPLETE CBC W/AUTO DIFF WBC: CPT

## 2020-10-21 PROCEDURE — 86356 MONONUCLEAR CELL ANTIGEN: CPT

## 2020-10-21 PROCEDURE — 86704 HEP B CORE ANTIBODY TOTAL: CPT

## 2020-10-21 PROCEDURE — 86706 HEP B SURFACE ANTIBODY: CPT

## 2020-10-21 PROCEDURE — 87340 HEPATITIS B SURFACE AG IA: CPT

## 2020-10-22 LAB
HBV CORE AB SERPL QL IA: NEGATIVE
HBV SURFACE AB SER-ACNC: POSITIVE M[IU]/ML
HBV SURFACE AG SERPL QL IA: NEGATIVE

## 2020-10-23 ENCOUNTER — PATIENT MESSAGE (OUTPATIENT)
Dept: NEUROLOGY | Facility: CLINIC | Age: 35
End: 2020-10-23

## 2020-10-23 LAB
APPEARANCE SPEC: NORMAL
CD20 CELLS NFR SPEC: NORMAL %
SPECIMEN SOURCE: NORMAL
VIABLE CELLS NFR SPEC: 99 %

## 2020-11-02 ENCOUNTER — PATIENT MESSAGE (OUTPATIENT)
Dept: PSYCHIATRY | Facility: CLINIC | Age: 35
End: 2020-11-02

## 2020-11-02 ENCOUNTER — HOSPITAL ENCOUNTER (OUTPATIENT)
Dept: RADIOLOGY | Facility: HOSPITAL | Age: 35
Discharge: HOME | End: 2020-11-02
Attending: PSYCHIATRY & NEUROLOGY
Payer: COMMERCIAL

## 2020-11-02 DIAGNOSIS — G35 MULTIPLE SCLEROSIS: ICD-10-CM

## 2020-11-02 PROCEDURE — 70553 MRI BRAIN STEM W/O & W/DYE: CPT | Mod: TC

## 2020-11-02 PROCEDURE — 70553 MRI BRAIN DEMYELINATING W/ WO CONTRAST: ICD-10-PCS | Mod: 26,,, | Performed by: RADIOLOGY

## 2020-11-02 PROCEDURE — 25500020 PHARM REV CODE 255: Performed by: PSYCHIATRY & NEUROLOGY

## 2020-11-02 PROCEDURE — A9585 GADOBUTROL INJECTION: HCPCS | Performed by: PSYCHIATRY & NEUROLOGY

## 2020-11-02 PROCEDURE — 70553 MRI BRAIN STEM W/O & W/DYE: CPT | Mod: 26,,, | Performed by: RADIOLOGY

## 2020-11-02 RX ORDER — GADOBUTROL 604.72 MG/ML
10 INJECTION INTRAVENOUS
Status: COMPLETED | OUTPATIENT
Start: 2020-11-02 | End: 2020-11-02

## 2020-11-02 RX ADMIN — GADOBUTROL 10 ML: 604.72 INJECTION INTRAVENOUS at 11:11

## 2020-11-05 ENCOUNTER — PATIENT MESSAGE (OUTPATIENT)
Dept: NEUROLOGY | Facility: CLINIC | Age: 35
End: 2020-11-05

## 2020-11-05 NOTE — TELEPHONE ENCOUNTER
Reviewed labs from 10/21:  Hep B Surface Ag negative  Hep B Surface Ab positive (past vaccination)  Hep B Core Ab negative   CD19, 20=0  WBC=6.77  MOM=[id number]

## 2020-11-16 ENCOUNTER — OFFICE VISIT (OUTPATIENT)
Dept: PSYCHIATRY | Facility: CLINIC | Age: 35
End: 2020-11-16
Payer: COMMERCIAL

## 2020-11-16 DIAGNOSIS — F41.9 ANXIETY: ICD-10-CM

## 2020-11-16 DIAGNOSIS — F33.41 RECURRENT MAJOR DEPRESSIVE DISORDER, IN PARTIAL REMISSION: Primary | ICD-10-CM

## 2020-11-16 PROCEDURE — 90834 PR PSYCHOTHERAPY W/PATIENT, 45 MIN: ICD-10-PCS | Mod: 95,,, | Performed by: SOCIAL WORKER

## 2020-11-16 PROCEDURE — 90834 PSYTX W PT 45 MINUTES: CPT | Mod: 95,,, | Performed by: SOCIAL WORKER
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
## 2020-11-22 ENCOUNTER — PATIENT MESSAGE (OUTPATIENT)
Dept: INTERNAL MEDICINE | Facility: CLINIC | Age: 35
End: 2020-11-22

## 2020-11-23 ENCOUNTER — TELEPHONE (OUTPATIENT)
Dept: NEUROLOGY | Facility: CLINIC | Age: 35
End: 2020-11-23

## 2020-11-23 ENCOUNTER — INFUSION (OUTPATIENT)
Dept: INFUSION THERAPY | Facility: OTHER | Age: 35
End: 2020-11-23
Attending: STUDENT IN AN ORGANIZED HEALTH CARE EDUCATION/TRAINING PROGRAM
Payer: COMMERCIAL

## 2020-11-23 ENCOUNTER — PATIENT MESSAGE (OUTPATIENT)
Dept: INTERNAL MEDICINE | Facility: CLINIC | Age: 35
End: 2020-11-23

## 2020-11-23 VITALS
DIASTOLIC BLOOD PRESSURE: 62 MMHG | OXYGEN SATURATION: 99 % | HEART RATE: 69 BPM | RESPIRATION RATE: 16 BRPM | TEMPERATURE: 98 F | SYSTOLIC BLOOD PRESSURE: 105 MMHG

## 2020-11-23 DIAGNOSIS — G35 MULTIPLE SCLEROSIS: Primary | ICD-10-CM

## 2020-11-23 PROCEDURE — 96367 TX/PROPH/DG ADDL SEQ IV INF: CPT

## 2020-11-23 PROCEDURE — 96366 THER/PROPH/DIAG IV INF ADDON: CPT

## 2020-11-23 PROCEDURE — 96375 TX/PRO/DX INJ NEW DRUG ADDON: CPT

## 2020-11-23 PROCEDURE — 25000003 PHARM REV CODE 250: Performed by: PSYCHIATRY & NEUROLOGY

## 2020-11-23 PROCEDURE — 96365 THER/PROPH/DIAG IV INF INIT: CPT

## 2020-11-23 PROCEDURE — 63600175 PHARM REV CODE 636 W HCPCS: Mod: TB | Performed by: PSYCHIATRY & NEUROLOGY

## 2020-11-23 RX ORDER — EPINEPHRINE 0.3 MG/.3ML
0.3 INJECTION SUBCUTANEOUS
Status: CANCELLED | OUTPATIENT
Start: 2021-04-05

## 2020-11-23 RX ORDER — DIPHENHYDRAMINE HYDROCHLORIDE 50 MG/ML
50 INJECTION INTRAMUSCULAR; INTRAVENOUS
Status: CANCELLED | OUTPATIENT
Start: 2021-04-05

## 2020-11-23 RX ORDER — ACETAMINOPHEN 500 MG
1000 TABLET ORAL
Status: COMPLETED | OUTPATIENT
Start: 2020-11-23 | End: 2020-11-23

## 2020-11-23 RX ORDER — SODIUM CHLORIDE 0.9 % (FLUSH) 0.9 %
10 SYRINGE (ML) INJECTION
Status: CANCELLED | OUTPATIENT
Start: 2021-04-05

## 2020-11-23 RX ORDER — DIPHENHYDRAMINE HYDROCHLORIDE 50 MG/ML
50 INJECTION INTRAMUSCULAR; INTRAVENOUS
Status: DISCONTINUED | OUTPATIENT
Start: 2020-11-23 | End: 2020-11-23 | Stop reason: HOSPADM

## 2020-11-23 RX ORDER — ACETAMINOPHEN 500 MG
1000 TABLET ORAL
Status: CANCELLED | OUTPATIENT
Start: 2021-04-05

## 2020-11-23 RX ORDER — FAMOTIDINE 10 MG/ML
20 INJECTION INTRAVENOUS
Status: CANCELLED | OUTPATIENT
Start: 2021-04-05

## 2020-11-23 RX ORDER — HEPARIN 100 UNIT/ML
500 SYRINGE INTRAVENOUS
Status: CANCELLED | OUTPATIENT
Start: 2021-04-05

## 2020-11-23 RX ORDER — EPINEPHRINE 0.3 MG/.3ML
0.3 INJECTION SUBCUTANEOUS
Status: DISCONTINUED | OUTPATIENT
Start: 2020-11-23 | End: 2020-11-23 | Stop reason: HOSPADM

## 2020-11-23 RX ORDER — HEPARIN 100 UNIT/ML
500 SYRINGE INTRAVENOUS
Status: DISCONTINUED | OUTPATIENT
Start: 2020-11-23 | End: 2020-11-23 | Stop reason: HOSPADM

## 2020-11-23 RX ORDER — SODIUM CHLORIDE 0.9 % (FLUSH) 0.9 %
10 SYRINGE (ML) INJECTION
Status: DISCONTINUED | OUTPATIENT
Start: 2020-11-23 | End: 2020-11-23 | Stop reason: HOSPADM

## 2020-11-23 RX ORDER — FAMOTIDINE 10 MG/ML
20 INJECTION INTRAVENOUS
Status: COMPLETED | OUTPATIENT
Start: 2020-11-23 | End: 2020-11-23

## 2020-11-23 RX ADMIN — SODIUM CHLORIDE: 0.9 INJECTION, SOLUTION INTRAVENOUS at 08:11

## 2020-11-23 RX ADMIN — OCRELIZUMAB 600 MG: 300 INJECTION INTRAVENOUS at 09:11

## 2020-11-23 RX ADMIN — DIPHENHYDRAMINE HYDROCHLORIDE 50 MG: 50 INJECTION INTRAMUSCULAR; INTRAVENOUS at 11:11

## 2020-11-23 RX ADMIN — DIPHENHYDRAMINE HYDROCHLORIDE 50 MG: 50 INJECTION, SOLUTION INTRAMUSCULAR; INTRAVENOUS at 09:11

## 2020-11-23 RX ADMIN — ACETAMINOPHEN 1000 MG: 500 TABLET ORAL at 08:11

## 2020-11-23 RX ADMIN — DEXTROSE: 50 INJECTION, SOLUTION INTRAVENOUS at 09:11

## 2020-11-23 RX ADMIN — FAMOTIDINE 20 MG: 10 INJECTION INTRAVENOUS at 08:11

## 2020-11-23 NOTE — TELEPHONE ENCOUNTER
Can likely wait if this is chronic. But if worrisome, can schedule w/ Nadeen or Jessica to be evaluated?

## 2020-11-23 NOTE — PLAN OF CARE
Problem: Adult Inpatient Plan of Care  Goal: Optimal Comfort and Wellbeing  Outcome: Met     Problem: Adult Inpatient Plan of Care  Goal: Plan of Care Review  11/23/2020 7515 by Ese Ribera RN  Outcome: Ongoing, Progressing  Flowsheets (Taken 11/23/2020 3479)  Plan of Care Reviewed With: patient   Individualized care given. Patient completed Ocrevus. Tolerated well.  Left forearm IV removed with cath intact. Discharge instructions given via MyChart. No questions askes.

## 2020-11-24 ENCOUNTER — PATIENT MESSAGE (OUTPATIENT)
Dept: OBSTETRICS AND GYNECOLOGY | Facility: CLINIC | Age: 35
End: 2020-11-24

## 2020-12-06 DIAGNOSIS — Z30.41 ENCOUNTER FOR SURVEILLANCE OF CONTRACEPTIVE PILLS: ICD-10-CM

## 2020-12-07 ENCOUNTER — TELEPHONE (OUTPATIENT)
Dept: OBSTETRICS AND GYNECOLOGY | Facility: CLINIC | Age: 35
End: 2020-12-07

## 2020-12-07 RX ORDER — DROSPIRENONE AND ETHINYL ESTRADIOL TABLETS 0.02-3(28)
1 KIT ORAL DAILY
Qty: 90 TABLET | Refills: 3 | OUTPATIENT
Start: 2020-12-07

## 2020-12-07 NOTE — TELEPHONE ENCOUNTER
Reached out to to the pt to inform her that she has refills left at her pharmacy for her birth control. Pt did not answer, LVM encouraging to reach out to pharmacy to have them fill it. I also left a call back number for the pt.

## 2020-12-07 NOTE — TELEPHONE ENCOUNTER
Contacted pt's pharmacy to see if she had any refills left on her birth control. The tech stated that the pt did in fact have refills left and that they would fill it for her.

## 2020-12-14 ENCOUNTER — PATIENT MESSAGE (OUTPATIENT)
Dept: INTERNAL MEDICINE | Facility: CLINIC | Age: 35
End: 2020-12-14

## 2020-12-23 ENCOUNTER — PATIENT MESSAGE (OUTPATIENT)
Dept: PSYCHIATRY | Facility: CLINIC | Age: 35
End: 2020-12-23

## 2021-01-08 ENCOUNTER — OFFICE VISIT (OUTPATIENT)
Dept: INTERNAL MEDICINE | Facility: CLINIC | Age: 36
End: 2021-01-08
Payer: COMMERCIAL

## 2021-01-08 VITALS
DIASTOLIC BLOOD PRESSURE: 72 MMHG | OXYGEN SATURATION: 99 % | SYSTOLIC BLOOD PRESSURE: 110 MMHG | HEART RATE: 68 BPM | WEIGHT: 147.25 LBS | BODY MASS INDEX: 23.66 KG/M2 | HEIGHT: 66 IN

## 2021-01-08 DIAGNOSIS — F32.A ANXIETY AND DEPRESSION: ICD-10-CM

## 2021-01-08 DIAGNOSIS — D75.89 MACROCYTOSIS: ICD-10-CM

## 2021-01-08 DIAGNOSIS — Z00.00 ANNUAL PHYSICAL EXAM: Primary | ICD-10-CM

## 2021-01-08 DIAGNOSIS — G35 MULTIPLE SCLEROSIS: ICD-10-CM

## 2021-01-08 DIAGNOSIS — F41.9 ANXIETY AND DEPRESSION: ICD-10-CM

## 2021-01-08 PROCEDURE — 99999 PR PBB SHADOW E&M-EST. PATIENT-LVL IV: ICD-10-PCS | Mod: PBBFAC,,, | Performed by: INTERNAL MEDICINE

## 2021-01-08 PROCEDURE — 99395 PREV VISIT EST AGE 18-39: CPT | Mod: S$GLB,,, | Performed by: INTERNAL MEDICINE

## 2021-01-08 PROCEDURE — 3008F PR BODY MASS INDEX (BMI) DOCUMENTED: ICD-10-PCS | Mod: CPTII,S$GLB,, | Performed by: INTERNAL MEDICINE

## 2021-01-08 PROCEDURE — 99395 PR PREVENTIVE VISIT,EST,18-39: ICD-10-PCS | Mod: S$GLB,,, | Performed by: INTERNAL MEDICINE

## 2021-01-08 PROCEDURE — 3008F BODY MASS INDEX DOCD: CPT | Mod: CPTII,S$GLB,, | Performed by: INTERNAL MEDICINE

## 2021-01-08 PROCEDURE — 1126F PR PAIN SEVERITY QUANTIFIED, NO PAIN PRESENT: ICD-10-PCS | Mod: S$GLB,,, | Performed by: INTERNAL MEDICINE

## 2021-01-08 PROCEDURE — 1126F AMNT PAIN NOTED NONE PRSNT: CPT | Mod: S$GLB,,, | Performed by: INTERNAL MEDICINE

## 2021-01-08 PROCEDURE — 99999 PR PBB SHADOW E&M-EST. PATIENT-LVL IV: CPT | Mod: PBBFAC,,, | Performed by: INTERNAL MEDICINE

## 2021-01-08 RX ORDER — FAMOTIDINE 40 MG/1
40 TABLET, FILM COATED ORAL DAILY
Qty: 30 TABLET | Refills: 11 | Status: SHIPPED | OUTPATIENT
Start: 2021-01-08 | End: 2021-02-15

## 2021-01-11 ENCOUNTER — LAB VISIT (OUTPATIENT)
Dept: LAB | Facility: HOSPITAL | Age: 36
End: 2021-01-11
Attending: INTERNAL MEDICINE
Payer: COMMERCIAL

## 2021-01-11 DIAGNOSIS — D53.9 MACROCYTIC ANEMIA: ICD-10-CM

## 2021-01-11 DIAGNOSIS — Z00.00 ANNUAL PHYSICAL EXAM: ICD-10-CM

## 2021-01-11 LAB
ALBUMIN SERPL BCP-MCNC: 3.5 G/DL (ref 3.5–5.2)
ALP SERPL-CCNC: 35 U/L (ref 55–135)
ALT SERPL W/O P-5'-P-CCNC: 18 U/L (ref 10–44)
ANION GAP SERPL CALC-SCNC: 8 MMOL/L (ref 8–16)
AST SERPL-CCNC: 18 U/L (ref 10–40)
BASOPHILS # BLD AUTO: 0.03 K/UL (ref 0–0.2)
BASOPHILS NFR BLD: 0.8 % (ref 0–1.9)
BILIRUB SERPL-MCNC: 0.2 MG/DL (ref 0.1–1)
BUN SERPL-MCNC: 7 MG/DL (ref 6–20)
CALCIUM SERPL-MCNC: 8.8 MG/DL (ref 8.7–10.5)
CHLORIDE SERPL-SCNC: 106 MMOL/L (ref 95–110)
CHOLEST SERPL-MCNC: 171 MG/DL (ref 120–199)
CHOLEST/HDLC SERPL: 1.9 {RATIO} (ref 2–5)
CO2 SERPL-SCNC: 26 MMOL/L (ref 23–29)
CREAT SERPL-MCNC: 0.7 MG/DL (ref 0.5–1.4)
DIFFERENTIAL METHOD: ABNORMAL
EOSINOPHIL # BLD AUTO: 0.1 K/UL (ref 0–0.5)
EOSINOPHIL NFR BLD: 1.5 % (ref 0–8)
ERYTHROCYTE [DISTWIDTH] IN BLOOD BY AUTOMATED COUNT: 12.2 % (ref 11.5–14.5)
EST. GFR  (AFRICAN AMERICAN): >60 ML/MIN/1.73 M^2
EST. GFR  (NON AFRICAN AMERICAN): >60 ML/MIN/1.73 M^2
FOLATE SERPL-MCNC: 14 NG/ML (ref 4–24)
GLUCOSE SERPL-MCNC: 96 MG/DL (ref 70–110)
HCT VFR BLD AUTO: 35.5 % (ref 37–48.5)
HDLC SERPL-MCNC: 92 MG/DL (ref 40–75)
HDLC SERPL: 53.8 % (ref 20–50)
HGB BLD-MCNC: 11.9 G/DL (ref 12–16)
IMM GRANULOCYTES # BLD AUTO: 0.02 K/UL (ref 0–0.04)
IMM GRANULOCYTES NFR BLD AUTO: 0.5 % (ref 0–0.5)
LDLC SERPL CALC-MCNC: 63 MG/DL (ref 63–159)
LYMPHOCYTES # BLD AUTO: 1.7 K/UL (ref 1–4.8)
LYMPHOCYTES NFR BLD: 41.6 % (ref 18–48)
MCH RBC QN AUTO: 33.6 PG (ref 27–31)
MCHC RBC AUTO-ENTMCNC: 33.5 G/DL (ref 32–36)
MCV RBC AUTO: 100 FL (ref 82–98)
MONOCYTES # BLD AUTO: 0.2 K/UL (ref 0.3–1)
MONOCYTES NFR BLD: 5.5 % (ref 4–15)
NEUTROPHILS # BLD AUTO: 2 K/UL (ref 1.8–7.7)
NEUTROPHILS NFR BLD: 50.1 % (ref 38–73)
NONHDLC SERPL-MCNC: 79 MG/DL
NRBC BLD-RTO: 0 /100 WBC
PLATELET # BLD AUTO: 138 K/UL (ref 150–350)
PMV BLD AUTO: 10.9 FL (ref 9.2–12.9)
POTASSIUM SERPL-SCNC: 4.3 MMOL/L (ref 3.5–5.1)
PROT SERPL-MCNC: 6.6 G/DL (ref 6–8.4)
RBC # BLD AUTO: 3.54 M/UL (ref 4–5.4)
RETICS/RBC NFR AUTO: 2.4 % (ref 0.5–2.5)
SARS-COV-2 IGG SERPLBLD QL IA.RAPID: NEGATIVE
SODIUM SERPL-SCNC: 140 MMOL/L (ref 136–145)
TRIGL SERPL-MCNC: 80 MG/DL (ref 30–150)
TSH SERPL DL<=0.005 MIU/L-ACNC: 0.89 UIU/ML (ref 0.4–4)
VIT B12 SERPL-MCNC: 573 PG/ML (ref 210–950)
WBC # BLD AUTO: 3.97 K/UL (ref 3.9–12.7)

## 2021-01-11 PROCEDURE — 82607 VITAMIN B-12: CPT

## 2021-01-11 PROCEDURE — 82746 ASSAY OF FOLIC ACID SERUM: CPT

## 2021-01-11 PROCEDURE — 36415 COLL VENOUS BLD VENIPUNCTURE: CPT | Mod: PO

## 2021-01-11 PROCEDURE — 86769 SARS-COV-2 COVID-19 ANTIBODY: CPT

## 2021-01-11 PROCEDURE — 80053 COMPREHEN METABOLIC PANEL: CPT

## 2021-01-11 PROCEDURE — 80061 LIPID PANEL: CPT

## 2021-01-11 PROCEDURE — 84443 ASSAY THYROID STIM HORMONE: CPT

## 2021-01-11 PROCEDURE — 85045 AUTOMATED RETICULOCYTE COUNT: CPT

## 2021-01-11 PROCEDURE — 85025 COMPLETE CBC W/AUTO DIFF WBC: CPT

## 2021-01-20 ENCOUNTER — PATIENT MESSAGE (OUTPATIENT)
Dept: NEUROLOGY | Facility: CLINIC | Age: 36
End: 2021-01-20

## 2021-01-25 ENCOUNTER — OFFICE VISIT (OUTPATIENT)
Dept: PSYCHIATRY | Facility: CLINIC | Age: 36
End: 2021-01-25
Payer: COMMERCIAL

## 2021-01-25 DIAGNOSIS — F41.9 ANXIETY: ICD-10-CM

## 2021-01-25 DIAGNOSIS — F33.41 RECURRENT MAJOR DEPRESSIVE DISORDER, IN PARTIAL REMISSION: Primary | ICD-10-CM

## 2021-01-25 PROCEDURE — 90834 PSYTX W PT 45 MINUTES: CPT | Mod: 95,,, | Performed by: SOCIAL WORKER

## 2021-01-25 PROCEDURE — 90834 PR PSYCHOTHERAPY W/PATIENT, 45 MIN: ICD-10-PCS | Mod: 95,,, | Performed by: SOCIAL WORKER

## 2021-01-29 ENCOUNTER — PATIENT MESSAGE (OUTPATIENT)
Dept: INTERNAL MEDICINE | Facility: CLINIC | Age: 36
End: 2021-01-29

## 2021-02-05 ENCOUNTER — PATIENT MESSAGE (OUTPATIENT)
Dept: NEUROLOGY | Facility: CLINIC | Age: 36
End: 2021-02-05

## 2021-02-15 ENCOUNTER — OFFICE VISIT (OUTPATIENT)
Dept: NEUROLOGY | Facility: CLINIC | Age: 36
End: 2021-02-15
Payer: COMMERCIAL

## 2021-02-15 ENCOUNTER — PATIENT MESSAGE (OUTPATIENT)
Dept: NEUROLOGY | Facility: CLINIC | Age: 36
End: 2021-02-15

## 2021-02-15 DIAGNOSIS — Z29.89 PROPHYLACTIC IMMUNOTHERAPY: ICD-10-CM

## 2021-02-15 DIAGNOSIS — Z79.899 HIGH RISK MEDICATION USE: ICD-10-CM

## 2021-02-15 DIAGNOSIS — G35 MULTIPLE SCLEROSIS: Primary | ICD-10-CM

## 2021-02-15 DIAGNOSIS — Z71.89 COUNSELING REGARDING GOALS OF CARE: ICD-10-CM

## 2021-02-15 PROCEDURE — 99214 PR OFFICE/OUTPT VISIT, EST, LEVL IV, 30-39 MIN: ICD-10-PCS | Mod: 95,,, | Performed by: CLINICAL NURSE SPECIALIST

## 2021-02-15 PROCEDURE — 99214 OFFICE O/P EST MOD 30 MIN: CPT | Mod: 95,,, | Performed by: CLINICAL NURSE SPECIALIST

## 2021-02-16 ENCOUNTER — PATIENT MESSAGE (OUTPATIENT)
Dept: NEUROLOGY | Facility: CLINIC | Age: 36
End: 2021-02-16

## 2021-02-17 ENCOUNTER — PATIENT MESSAGE (OUTPATIENT)
Dept: OBSTETRICS AND GYNECOLOGY | Facility: CLINIC | Age: 36
End: 2021-02-17

## 2021-02-18 ENCOUNTER — TELEPHONE (OUTPATIENT)
Dept: NEUROLOGY | Facility: CLINIC | Age: 36
End: 2021-02-18

## 2021-03-21 ENCOUNTER — PATIENT MESSAGE (OUTPATIENT)
Dept: PSYCHIATRY | Facility: CLINIC | Age: 36
End: 2021-03-21

## 2021-03-24 DIAGNOSIS — F41.9 ANXIETY AND DEPRESSION: ICD-10-CM

## 2021-03-24 DIAGNOSIS — F32.A ANXIETY AND DEPRESSION: ICD-10-CM

## 2021-03-24 RX ORDER — SERTRALINE HYDROCHLORIDE 25 MG/1
25 TABLET, FILM COATED ORAL DAILY
Qty: 30 TABLET | Refills: 6 | Status: SHIPPED | OUTPATIENT
Start: 2021-03-24 | End: 2021-10-25

## 2021-03-29 ENCOUNTER — PATIENT MESSAGE (OUTPATIENT)
Dept: NEUROLOGY | Facility: CLINIC | Age: 36
End: 2021-03-29

## 2021-04-13 ENCOUNTER — PATIENT MESSAGE (OUTPATIENT)
Dept: NEUROLOGY | Facility: CLINIC | Age: 36
End: 2021-04-13

## 2021-04-21 ENCOUNTER — LAB VISIT (OUTPATIENT)
Dept: LAB | Facility: HOSPITAL | Age: 36
End: 2021-04-21
Attending: CLINICAL NURSE SPECIALIST
Payer: COMMERCIAL

## 2021-04-21 DIAGNOSIS — G35 MULTIPLE SCLEROSIS: ICD-10-CM

## 2021-04-21 LAB
BASOPHILS # BLD AUTO: 0.04 K/UL (ref 0–0.2)
BASOPHILS NFR BLD: 0.8 % (ref 0–1.9)
DIFFERENTIAL METHOD: ABNORMAL
EOSINOPHIL # BLD AUTO: 0.1 K/UL (ref 0–0.5)
EOSINOPHIL NFR BLD: 1.7 % (ref 0–8)
ERYTHROCYTE [DISTWIDTH] IN BLOOD BY AUTOMATED COUNT: 12.5 % (ref 11.5–14.5)
HCT VFR BLD AUTO: 35.8 % (ref 37–48.5)
HGB BLD-MCNC: 11.9 G/DL (ref 12–16)
IGA SERPL-MCNC: 135 MG/DL (ref 40–350)
IGG SERPL-MCNC: 1314 MG/DL (ref 650–1600)
IGM SERPL-MCNC: 43 MG/DL (ref 50–300)
IMM GRANULOCYTES # BLD AUTO: 0.01 K/UL (ref 0–0.04)
IMM GRANULOCYTES NFR BLD AUTO: 0.2 % (ref 0–0.5)
LYMPHOCYTES # BLD AUTO: 2.2 K/UL (ref 1–4.8)
LYMPHOCYTES NFR BLD: 45.5 % (ref 18–48)
MCH RBC QN AUTO: 33.7 PG (ref 27–31)
MCHC RBC AUTO-ENTMCNC: 33.2 G/DL (ref 32–36)
MCV RBC AUTO: 101 FL (ref 82–98)
MONOCYTES # BLD AUTO: 0.3 K/UL (ref 0.3–1)
MONOCYTES NFR BLD: 5.5 % (ref 4–15)
NEUTROPHILS # BLD AUTO: 2.2 K/UL (ref 1.8–7.7)
NEUTROPHILS NFR BLD: 46.3 % (ref 38–73)
NRBC BLD-RTO: 0 /100 WBC
PLATELET # BLD AUTO: 134 K/UL (ref 150–450)
PMV BLD AUTO: 10.9 FL (ref 9.2–12.9)
RBC # BLD AUTO: 3.53 M/UL (ref 4–5.4)
WBC # BLD AUTO: 4.77 K/UL (ref 3.9–12.7)

## 2021-04-21 PROCEDURE — 86704 HEP B CORE ANTIBODY TOTAL: CPT | Performed by: CLINICAL NURSE SPECIALIST

## 2021-04-21 PROCEDURE — 86706 HEP B SURFACE ANTIBODY: CPT | Performed by: CLINICAL NURSE SPECIALIST

## 2021-04-21 PROCEDURE — 36415 COLL VENOUS BLD VENIPUNCTURE: CPT | Mod: PO | Performed by: CLINICAL NURSE SPECIALIST

## 2021-04-21 PROCEDURE — 87340 HEPATITIS B SURFACE AG IA: CPT | Performed by: CLINICAL NURSE SPECIALIST

## 2021-04-21 PROCEDURE — 88184 FLOWCYTOMETRY/ TC 1 MARKER: CPT | Performed by: CLINICAL NURSE SPECIALIST

## 2021-04-21 PROCEDURE — 82784 ASSAY IGA/IGD/IGG/IGM EACH: CPT | Mod: 59 | Performed by: CLINICAL NURSE SPECIALIST

## 2021-04-21 PROCEDURE — 85025 COMPLETE CBC W/AUTO DIFF WBC: CPT | Performed by: CLINICAL NURSE SPECIALIST

## 2021-04-23 ENCOUNTER — PATIENT MESSAGE (OUTPATIENT)
Dept: NEUROLOGY | Facility: CLINIC | Age: 36
End: 2021-04-23

## 2021-04-23 LAB
PATH REPORT.FINAL DX SPEC: NORMAL
RITUXAN SENSITIVITY (CD20): NORMAL

## 2021-04-28 ENCOUNTER — PATIENT MESSAGE (OUTPATIENT)
Dept: NEUROLOGY | Facility: CLINIC | Age: 36
End: 2021-04-28

## 2021-04-28 DIAGNOSIS — K06.9 DISEASE OF GINGIVA DUE TO RECURRENT ORAL HERPES SIMPLEX VIRUS (HSV) INFECTION: ICD-10-CM

## 2021-04-28 DIAGNOSIS — B00.9 DISEASE OF GINGIVA DUE TO RECURRENT ORAL HERPES SIMPLEX VIRUS (HSV) INFECTION: ICD-10-CM

## 2021-04-28 RX ORDER — VALACYCLOVIR HYDROCHLORIDE 1 G/1
1000 TABLET, FILM COATED ORAL 2 TIMES DAILY PRN
Qty: 60 TABLET | Refills: 5 | Status: SHIPPED | OUTPATIENT
Start: 2021-04-28 | End: 2021-05-25 | Stop reason: SDUPTHER

## 2021-05-17 ENCOUNTER — OFFICE VISIT (OUTPATIENT)
Dept: NEUROLOGY | Facility: CLINIC | Age: 36
End: 2021-05-17
Payer: COMMERCIAL

## 2021-05-17 VITALS
SYSTOLIC BLOOD PRESSURE: 107 MMHG | WEIGHT: 152.75 LBS | HEIGHT: 66 IN | DIASTOLIC BLOOD PRESSURE: 74 MMHG | HEART RATE: 63 BPM | BODY MASS INDEX: 24.55 KG/M2

## 2021-05-17 DIAGNOSIS — Z29.89 PROPHYLACTIC IMMUNOTHERAPY: ICD-10-CM

## 2021-05-17 DIAGNOSIS — Z79.899 HIGH RISK MEDICATION USE: ICD-10-CM

## 2021-05-17 DIAGNOSIS — Z71.89 COUNSELING REGARDING GOALS OF CARE: ICD-10-CM

## 2021-05-17 DIAGNOSIS — G35 MULTIPLE SCLEROSIS: Primary | ICD-10-CM

## 2021-05-17 PROCEDURE — 1126F AMNT PAIN NOTED NONE PRSNT: CPT | Mod: S$GLB,,, | Performed by: CLINICAL NURSE SPECIALIST

## 2021-05-17 PROCEDURE — 99214 PR OFFICE/OUTPT VISIT, EST, LEVL IV, 30-39 MIN: ICD-10-PCS | Mod: S$GLB,,, | Performed by: CLINICAL NURSE SPECIALIST

## 2021-05-17 PROCEDURE — 3008F PR BODY MASS INDEX (BMI) DOCUMENTED: ICD-10-PCS | Mod: CPTII,S$GLB,, | Performed by: CLINICAL NURSE SPECIALIST

## 2021-05-17 PROCEDURE — 99214 OFFICE O/P EST MOD 30 MIN: CPT | Mod: S$GLB,,, | Performed by: CLINICAL NURSE SPECIALIST

## 2021-05-17 PROCEDURE — 99999 PR PBB SHADOW E&M-EST. PATIENT-LVL III: CPT | Mod: PBBFAC,,, | Performed by: CLINICAL NURSE SPECIALIST

## 2021-05-17 PROCEDURE — 1126F PR PAIN SEVERITY QUANTIFIED, NO PAIN PRESENT: ICD-10-PCS | Mod: S$GLB,,, | Performed by: CLINICAL NURSE SPECIALIST

## 2021-05-17 PROCEDURE — 99999 PR PBB SHADOW E&M-EST. PATIENT-LVL III: ICD-10-PCS | Mod: PBBFAC,,, | Performed by: CLINICAL NURSE SPECIALIST

## 2021-05-17 PROCEDURE — 3008F BODY MASS INDEX DOCD: CPT | Mod: CPTII,S$GLB,, | Performed by: CLINICAL NURSE SPECIALIST

## 2021-05-24 ENCOUNTER — INFUSION (OUTPATIENT)
Dept: INFUSION THERAPY | Facility: OTHER | Age: 36
End: 2021-05-24
Attending: OBSTETRICS & GYNECOLOGY
Payer: COMMERCIAL

## 2021-05-24 VITALS
DIASTOLIC BLOOD PRESSURE: 63 MMHG | OXYGEN SATURATION: 98 % | SYSTOLIC BLOOD PRESSURE: 101 MMHG | TEMPERATURE: 98 F | RESPIRATION RATE: 18 BRPM | HEART RATE: 79 BPM

## 2021-05-24 DIAGNOSIS — G35 MULTIPLE SCLEROSIS: Primary | ICD-10-CM

## 2021-05-24 PROCEDURE — 96367 TX/PROPH/DG ADDL SEQ IV INF: CPT

## 2021-05-24 PROCEDURE — 96413 CHEMO IV INFUSION 1 HR: CPT

## 2021-05-24 PROCEDURE — 63600175 PHARM REV CODE 636 W HCPCS: Mod: JG | Performed by: PSYCHIATRY & NEUROLOGY

## 2021-05-24 PROCEDURE — 25000003 PHARM REV CODE 250: Performed by: PSYCHIATRY & NEUROLOGY

## 2021-05-24 PROCEDURE — 96415 CHEMO IV INFUSION ADDL HR: CPT

## 2021-05-24 PROCEDURE — 96375 TX/PRO/DX INJ NEW DRUG ADDON: CPT

## 2021-05-24 RX ORDER — FAMOTIDINE 10 MG/ML
20 INJECTION INTRAVENOUS
Status: COMPLETED | OUTPATIENT
Start: 2021-05-24 | End: 2021-05-24

## 2021-05-24 RX ORDER — DIPHENHYDRAMINE HYDROCHLORIDE 50 MG/ML
50 INJECTION INTRAMUSCULAR; INTRAVENOUS
Status: DISCONTINUED | OUTPATIENT
Start: 2021-05-24 | End: 2021-05-24 | Stop reason: HOSPADM

## 2021-05-24 RX ORDER — EPINEPHRINE 0.3 MG/.3ML
0.3 INJECTION SUBCUTANEOUS
Status: DISCONTINUED | OUTPATIENT
Start: 2021-05-24 | End: 2021-05-24 | Stop reason: HOSPADM

## 2021-05-24 RX ORDER — EPINEPHRINE 0.3 MG/.3ML
0.3 INJECTION SUBCUTANEOUS
Status: CANCELLED | OUTPATIENT
Start: 2021-07-05

## 2021-05-24 RX ORDER — HEPARIN 100 UNIT/ML
500 SYRINGE INTRAVENOUS
Status: DISCONTINUED | OUTPATIENT
Start: 2021-05-24 | End: 2021-05-24 | Stop reason: HOSPADM

## 2021-05-24 RX ORDER — ACETAMINOPHEN 500 MG
1000 TABLET ORAL
Status: COMPLETED | OUTPATIENT
Start: 2021-05-24 | End: 2021-05-24

## 2021-05-24 RX ORDER — FAMOTIDINE 10 MG/ML
20 INJECTION INTRAVENOUS
Status: CANCELLED | OUTPATIENT
Start: 2021-07-05

## 2021-05-24 RX ORDER — HEPARIN 100 UNIT/ML
500 SYRINGE INTRAVENOUS
Status: CANCELLED | OUTPATIENT
Start: 2021-07-05

## 2021-05-24 RX ORDER — DIPHENHYDRAMINE HYDROCHLORIDE 50 MG/ML
50 INJECTION INTRAMUSCULAR; INTRAVENOUS
Status: CANCELLED | OUTPATIENT
Start: 2021-07-05

## 2021-05-24 RX ORDER — ACETAMINOPHEN 500 MG
1000 TABLET ORAL
Status: CANCELLED | OUTPATIENT
Start: 2021-07-05

## 2021-05-24 RX ORDER — SODIUM CHLORIDE 0.9 % (FLUSH) 0.9 %
10 SYRINGE (ML) INJECTION
Status: CANCELLED | OUTPATIENT
Start: 2021-07-05

## 2021-05-24 RX ORDER — SODIUM CHLORIDE 0.9 % (FLUSH) 0.9 %
10 SYRINGE (ML) INJECTION
Status: DISCONTINUED | OUTPATIENT
Start: 2021-05-24 | End: 2021-05-24 | Stop reason: HOSPADM

## 2021-05-24 RX ADMIN — DIPHENHYDRAMINE HYDROCHLORIDE 50 MG: 50 INJECTION, SOLUTION INTRAMUSCULAR; INTRAVENOUS at 09:05

## 2021-05-24 RX ADMIN — OCRELIZUMAB 600 MG: 300 INJECTION INTRAVENOUS at 09:05

## 2021-05-24 RX ADMIN — ACETAMINOPHEN 1000 MG: 500 TABLET ORAL at 09:05

## 2021-05-24 RX ADMIN — FAMOTIDINE 20 MG: 10 INJECTION INTRAVENOUS at 09:05

## 2021-05-24 RX ADMIN — DIPHENHYDRAMINE HYDROCHLORIDE 50 MG: 50 INJECTION INTRAMUSCULAR; INTRAVENOUS at 11:05

## 2021-05-24 RX ADMIN — SODIUM CHLORIDE: 0.9 INJECTION, SOLUTION INTRAVENOUS at 09:05

## 2021-05-24 RX ADMIN — DEXTROSE: 50 INJECTION, SOLUTION INTRAVENOUS at 09:05

## 2021-05-31 ENCOUNTER — PATIENT MESSAGE (OUTPATIENT)
Dept: PSYCHIATRY | Facility: CLINIC | Age: 36
End: 2021-05-31

## 2021-06-03 ENCOUNTER — PATIENT MESSAGE (OUTPATIENT)
Dept: INTERNAL MEDICINE | Facility: CLINIC | Age: 36
End: 2021-06-03

## 2021-08-13 ENCOUNTER — PATIENT MESSAGE (OUTPATIENT)
Dept: NEUROLOGY | Facility: CLINIC | Age: 36
End: 2021-08-13

## 2021-09-01 ENCOUNTER — PATIENT MESSAGE (OUTPATIENT)
Dept: PSYCHIATRY | Facility: CLINIC | Age: 36
End: 2021-09-01

## 2021-09-02 ENCOUNTER — PATIENT MESSAGE (OUTPATIENT)
Dept: PSYCHIATRY | Facility: CLINIC | Age: 36
End: 2021-09-02

## 2021-09-04 ENCOUNTER — PATIENT MESSAGE (OUTPATIENT)
Dept: NEUROLOGY | Facility: CLINIC | Age: 36
End: 2021-09-04

## 2021-09-08 ENCOUNTER — PATIENT MESSAGE (OUTPATIENT)
Dept: NEUROLOGY | Facility: CLINIC | Age: 36
End: 2021-09-08

## 2021-09-08 DIAGNOSIS — U07.1 COVID-19: Primary | ICD-10-CM

## 2021-09-09 ENCOUNTER — INFUSION (OUTPATIENT)
Dept: INFECTIOUS DISEASES | Facility: HOSPITAL | Age: 36
End: 2021-09-09
Attending: PSYCHIATRY & NEUROLOGY
Payer: COMMERCIAL

## 2021-09-09 VITALS
HEART RATE: 60 BPM | RESPIRATION RATE: 18 BRPM | WEIGHT: 140 LBS | DIASTOLIC BLOOD PRESSURE: 70 MMHG | SYSTOLIC BLOOD PRESSURE: 109 MMHG | TEMPERATURE: 98 F | HEIGHT: 65 IN | BODY MASS INDEX: 23.32 KG/M2 | OXYGEN SATURATION: 100 %

## 2021-09-09 DIAGNOSIS — U07.1 COVID-19: Primary | ICD-10-CM

## 2021-09-09 PROCEDURE — 25000003 PHARM REV CODE 250: Performed by: INTERNAL MEDICINE

## 2021-09-09 PROCEDURE — 63600175 PHARM REV CODE 636 W HCPCS: Performed by: INTERNAL MEDICINE

## 2021-09-09 PROCEDURE — M0243 CASIRIVI AND IMDEVI INFUSION: HCPCS | Performed by: INTERNAL MEDICINE

## 2021-09-09 RX ORDER — ONDANSETRON 4 MG/1
4 TABLET, ORALLY DISINTEGRATING ORAL ONCE AS NEEDED
Status: DISCONTINUED | OUTPATIENT
Start: 2021-09-09 | End: 2021-11-03

## 2021-09-09 RX ORDER — ALBUTEROL SULFATE 90 UG/1
2 AEROSOL, METERED RESPIRATORY (INHALATION)
Status: DISCONTINUED | OUTPATIENT
Start: 2021-09-09 | End: 2021-11-03

## 2021-09-09 RX ORDER — EPINEPHRINE 0.3 MG/.3ML
0.3 INJECTION SUBCUTANEOUS
Status: DISCONTINUED | OUTPATIENT
Start: 2021-09-09 | End: 2021-11-03

## 2021-09-09 RX ORDER — DIPHENHYDRAMINE HYDROCHLORIDE 50 MG/ML
25 INJECTION INTRAMUSCULAR; INTRAVENOUS ONCE AS NEEDED
Status: DISCONTINUED | OUTPATIENT
Start: 2021-09-09 | End: 2021-11-03

## 2021-09-09 RX ORDER — SODIUM CHLORIDE 0.9 % (FLUSH) 0.9 %
10 SYRINGE (ML) INJECTION
Status: DISCONTINUED | OUTPATIENT
Start: 2021-09-09 | End: 2021-11-03

## 2021-09-09 RX ORDER — ACETAMINOPHEN 325 MG/1
650 TABLET ORAL ONCE AS NEEDED
Status: DISCONTINUED | OUTPATIENT
Start: 2021-09-09 | End: 2021-11-03

## 2021-09-09 RX ADMIN — CASIRIVIMAB AND IMDEVIMAB 600 MG: 600; 600 INJECTION, SOLUTION, CONCENTRATE INTRAVENOUS at 01:09

## 2021-09-10 ENCOUNTER — PATIENT MESSAGE (OUTPATIENT)
Dept: INTERNAL MEDICINE | Facility: CLINIC | Age: 36
End: 2021-09-10

## 2021-10-07 ENCOUNTER — TELEPHONE (OUTPATIENT)
Dept: NEUROLOGY | Facility: CLINIC | Age: 36
End: 2021-10-07

## 2021-10-25 ENCOUNTER — LAB VISIT (OUTPATIENT)
Dept: LAB | Facility: HOSPITAL | Age: 36
End: 2021-10-25
Payer: COMMERCIAL

## 2021-10-25 DIAGNOSIS — G35 MULTIPLE SCLEROSIS: ICD-10-CM

## 2021-10-25 LAB
25(OH)D3+25(OH)D2 SERPL-MCNC: 81 NG/ML (ref 30–96)
ALBUMIN SERPL BCP-MCNC: 3.9 G/DL (ref 3.5–5.2)
ALP SERPL-CCNC: 42 U/L (ref 55–135)
ALT SERPL W/O P-5'-P-CCNC: 15 U/L (ref 10–44)
ANION GAP SERPL CALC-SCNC: 10 MMOL/L (ref 8–16)
AST SERPL-CCNC: 22 U/L (ref 10–40)
BASOPHILS # BLD AUTO: 0.03 K/UL (ref 0–0.2)
BASOPHILS NFR BLD: 0.4 % (ref 0–1.9)
BILIRUB SERPL-MCNC: 0.6 MG/DL (ref 0.1–1)
BUN SERPL-MCNC: 8 MG/DL (ref 6–20)
CALCIUM SERPL-MCNC: 9.7 MG/DL (ref 8.7–10.5)
CHLORIDE SERPL-SCNC: 99 MMOL/L (ref 95–110)
CO2 SERPL-SCNC: 22 MMOL/L (ref 23–29)
CREAT SERPL-MCNC: 0.7 MG/DL (ref 0.5–1.4)
DIFFERENTIAL METHOD: ABNORMAL
EOSINOPHIL # BLD AUTO: 0 K/UL (ref 0–0.5)
EOSINOPHIL NFR BLD: 0.5 % (ref 0–8)
ERYTHROCYTE [DISTWIDTH] IN BLOOD BY AUTOMATED COUNT: 12.6 % (ref 11.5–14.5)
EST. GFR  (AFRICAN AMERICAN): >60 ML/MIN/1.73 M^2
EST. GFR  (NON AFRICAN AMERICAN): >60 ML/MIN/1.73 M^2
GLUCOSE SERPL-MCNC: 105 MG/DL (ref 70–110)
HCT VFR BLD AUTO: 36.3 % (ref 37–48.5)
HGB BLD-MCNC: 11.8 G/DL (ref 12–16)
IGA SERPL-MCNC: 130 MG/DL (ref 40–350)
IGG SERPL-MCNC: 1255 MG/DL (ref 650–1600)
IGM SERPL-MCNC: 37 MG/DL (ref 50–300)
IMM GRANULOCYTES # BLD AUTO: 0.03 K/UL (ref 0–0.04)
IMM GRANULOCYTES NFR BLD AUTO: 0.4 % (ref 0–0.5)
LYMPHOCYTES # BLD AUTO: 1.8 K/UL (ref 1–4.8)
LYMPHOCYTES NFR BLD: 22.5 % (ref 18–48)
MCH RBC QN AUTO: 33.5 PG (ref 27–31)
MCHC RBC AUTO-ENTMCNC: 32.5 G/DL (ref 32–36)
MCV RBC AUTO: 103 FL (ref 82–98)
MONOCYTES # BLD AUTO: 0.3 K/UL (ref 0.3–1)
MONOCYTES NFR BLD: 4 % (ref 4–15)
NEUTROPHILS # BLD AUTO: 5.9 K/UL (ref 1.8–7.7)
NEUTROPHILS NFR BLD: 72.2 % (ref 38–73)
NRBC BLD-RTO: 0 /100 WBC
PLATELET # BLD AUTO: 162 K/UL (ref 150–450)
PMV BLD AUTO: 10.9 FL (ref 9.2–12.9)
POTASSIUM SERPL-SCNC: 3.8 MMOL/L (ref 3.5–5.1)
PROT SERPL-MCNC: 7.3 G/DL (ref 6–8.4)
RBC # BLD AUTO: 3.52 M/UL (ref 4–5.4)
SODIUM SERPL-SCNC: 131 MMOL/L (ref 136–145)
WBC # BLD AUTO: 8.17 K/UL (ref 3.9–12.7)

## 2021-10-25 PROCEDURE — 88184 FLOWCYTOMETRY/ TC 1 MARKER: CPT | Performed by: CLINICAL NURSE SPECIALIST

## 2021-10-25 PROCEDURE — 85025 COMPLETE CBC W/AUTO DIFF WBC: CPT | Performed by: CLINICAL NURSE SPECIALIST

## 2021-10-25 PROCEDURE — 82306 VITAMIN D 25 HYDROXY: CPT | Performed by: CLINICAL NURSE SPECIALIST

## 2021-10-25 PROCEDURE — 82784 ASSAY IGA/IGD/IGG/IGM EACH: CPT | Performed by: CLINICAL NURSE SPECIALIST

## 2021-10-25 PROCEDURE — 80053 COMPREHEN METABOLIC PANEL: CPT | Performed by: CLINICAL NURSE SPECIALIST

## 2021-10-25 PROCEDURE — 87340 HEPATITIS B SURFACE AG IA: CPT | Performed by: CLINICAL NURSE SPECIALIST

## 2021-10-25 PROCEDURE — 86706 HEP B SURFACE ANTIBODY: CPT | Performed by: CLINICAL NURSE SPECIALIST

## 2021-10-25 PROCEDURE — 36415 COLL VENOUS BLD VENIPUNCTURE: CPT | Mod: PO | Performed by: CLINICAL NURSE SPECIALIST

## 2021-10-25 PROCEDURE — 86704 HEP B CORE ANTIBODY TOTAL: CPT | Performed by: CLINICAL NURSE SPECIALIST

## 2021-10-26 ENCOUNTER — PATIENT MESSAGE (OUTPATIENT)
Dept: NEUROLOGY | Facility: CLINIC | Age: 36
End: 2021-10-26
Payer: COMMERCIAL

## 2021-10-27 ENCOUNTER — PATIENT MESSAGE (OUTPATIENT)
Dept: NEUROLOGY | Facility: CLINIC | Age: 36
End: 2021-10-27
Payer: COMMERCIAL

## 2021-10-27 DIAGNOSIS — E87.1 HYPONATREMIA: Primary | ICD-10-CM

## 2021-10-27 LAB
PATH REPORT.FINAL DX SPEC: NORMAL
RITUXAN SENSITIVITY (CD20): NORMAL

## 2021-11-01 ENCOUNTER — HOSPITAL ENCOUNTER (OUTPATIENT)
Dept: RADIOLOGY | Facility: HOSPITAL | Age: 36
Discharge: HOME | End: 2021-11-01
Attending: CLINICAL NURSE SPECIALIST
Payer: COMMERCIAL

## 2021-11-01 DIAGNOSIS — G35 MULTIPLE SCLEROSIS: ICD-10-CM

## 2021-11-01 PROCEDURE — 70551 MRI BRAIN DEMYELINATING WITHOUT CONTRAST: ICD-10-PCS | Mod: 26,,, | Performed by: RADIOLOGY

## 2021-11-01 PROCEDURE — 70551 MRI BRAIN STEM W/O DYE: CPT | Mod: 26,,, | Performed by: RADIOLOGY

## 2021-11-01 PROCEDURE — 70551 MRI BRAIN STEM W/O DYE: CPT | Mod: TC

## 2021-11-02 ENCOUNTER — PATIENT OUTREACH (OUTPATIENT)
Dept: ADMINISTRATIVE | Facility: OTHER | Age: 36
End: 2021-11-02
Payer: COMMERCIAL

## 2021-11-03 ENCOUNTER — LAB VISIT (OUTPATIENT)
Dept: LAB | Facility: HOSPITAL | Age: 36
End: 2021-11-03
Payer: COMMERCIAL

## 2021-11-03 ENCOUNTER — OFFICE VISIT (OUTPATIENT)
Dept: NEUROLOGY | Facility: CLINIC | Age: 36
End: 2021-11-03
Payer: COMMERCIAL

## 2021-11-03 VITALS
HEART RATE: 68 BPM | HEIGHT: 65 IN | WEIGHT: 149.56 LBS | SYSTOLIC BLOOD PRESSURE: 107 MMHG | BODY MASS INDEX: 24.92 KG/M2 | DIASTOLIC BLOOD PRESSURE: 64 MMHG

## 2021-11-03 DIAGNOSIS — E87.1 HYPONATREMIA: ICD-10-CM

## 2021-11-03 DIAGNOSIS — G35 MULTIPLE SCLEROSIS: Primary | ICD-10-CM

## 2021-11-03 DIAGNOSIS — D84.9 IMMUNOSUPPRESSION: ICD-10-CM

## 2021-11-03 DIAGNOSIS — Z71.89 COUNSELING REGARDING GOALS OF CARE: ICD-10-CM

## 2021-11-03 DIAGNOSIS — R90.89 ABNORMAL FINDING ON MRI OF BRAIN: ICD-10-CM

## 2021-11-03 DIAGNOSIS — M62.838 MUSCLE SPASM: ICD-10-CM

## 2021-11-03 LAB — SODIUM SERPL-SCNC: 137 MMOL/L (ref 136–145)

## 2021-11-03 PROCEDURE — 99215 PR OFFICE/OUTPT VISIT, EST, LEVL V, 40-54 MIN: ICD-10-PCS | Mod: S$GLB,,, | Performed by: PSYCHIATRY & NEUROLOGY

## 2021-11-03 PROCEDURE — 3078F PR MOST RECENT DIASTOLIC BLOOD PRESSURE < 80 MM HG: ICD-10-PCS | Mod: CPTII,S$GLB,, | Performed by: PSYCHIATRY & NEUROLOGY

## 2021-11-03 PROCEDURE — 99999 PR PBB SHADOW E&M-EST. PATIENT-LVL III: CPT | Mod: PBBFAC,,, | Performed by: PSYCHIATRY & NEUROLOGY

## 2021-11-03 PROCEDURE — 1160F RVW MEDS BY RX/DR IN RCRD: CPT | Mod: CPTII,S$GLB,, | Performed by: PSYCHIATRY & NEUROLOGY

## 2021-11-03 PROCEDURE — 3008F BODY MASS INDEX DOCD: CPT | Mod: CPTII,S$GLB,, | Performed by: PSYCHIATRY & NEUROLOGY

## 2021-11-03 PROCEDURE — 99999 PR PBB SHADOW E&M-EST. PATIENT-LVL III: ICD-10-PCS | Mod: PBBFAC,,, | Performed by: PSYCHIATRY & NEUROLOGY

## 2021-11-03 PROCEDURE — 99215 OFFICE O/P EST HI 40 MIN: CPT | Mod: S$GLB,,, | Performed by: PSYCHIATRY & NEUROLOGY

## 2021-11-03 PROCEDURE — 3078F DIAST BP <80 MM HG: CPT | Mod: CPTII,S$GLB,, | Performed by: PSYCHIATRY & NEUROLOGY

## 2021-11-03 PROCEDURE — 36415 COLL VENOUS BLD VENIPUNCTURE: CPT | Performed by: CLINICAL NURSE SPECIALIST

## 2021-11-03 PROCEDURE — 1159F MED LIST DOCD IN RCRD: CPT | Mod: CPTII,S$GLB,, | Performed by: PSYCHIATRY & NEUROLOGY

## 2021-11-03 PROCEDURE — 3074F SYST BP LT 130 MM HG: CPT | Mod: CPTII,S$GLB,, | Performed by: PSYCHIATRY & NEUROLOGY

## 2021-11-03 PROCEDURE — 1160F PR REVIEW ALL MEDS BY PRESCRIBER/CLIN PHARMACIST DOCUMENTED: ICD-10-PCS | Mod: CPTII,S$GLB,, | Performed by: PSYCHIATRY & NEUROLOGY

## 2021-11-03 PROCEDURE — 1159F PR MEDICATION LIST DOCUMENTED IN MEDICAL RECORD: ICD-10-PCS | Mod: CPTII,S$GLB,, | Performed by: PSYCHIATRY & NEUROLOGY

## 2021-11-03 PROCEDURE — 3074F PR MOST RECENT SYSTOLIC BLOOD PRESSURE < 130 MM HG: ICD-10-PCS | Mod: CPTII,S$GLB,, | Performed by: PSYCHIATRY & NEUROLOGY

## 2021-11-03 PROCEDURE — 3008F PR BODY MASS INDEX (BMI) DOCUMENTED: ICD-10-PCS | Mod: CPTII,S$GLB,, | Performed by: PSYCHIATRY & NEUROLOGY

## 2021-11-03 PROCEDURE — 84295 ASSAY OF SERUM SODIUM: CPT | Performed by: CLINICAL NURSE SPECIALIST

## 2021-11-03 RX ORDER — BACLOFEN 10 MG/1
10 TABLET ORAL NIGHTLY PRN
Qty: 90 TABLET | Refills: 11 | Status: SHIPPED | OUTPATIENT
Start: 2021-11-03 | End: 2022-11-23

## 2021-11-08 ENCOUNTER — PATIENT MESSAGE (OUTPATIENT)
Dept: NEUROLOGY | Facility: CLINIC | Age: 36
End: 2021-11-08
Payer: COMMERCIAL

## 2021-11-15 ENCOUNTER — INFUSION (OUTPATIENT)
Dept: INFUSION THERAPY | Facility: HOSPITAL | Age: 36
End: 2021-11-15
Payer: COMMERCIAL

## 2021-11-15 VITALS
BODY MASS INDEX: 25.49 KG/M2 | HEIGHT: 65 IN | SYSTOLIC BLOOD PRESSURE: 110 MMHG | HEART RATE: 81 BPM | TEMPERATURE: 98 F | WEIGHT: 153 LBS | DIASTOLIC BLOOD PRESSURE: 58 MMHG | RESPIRATION RATE: 18 BRPM

## 2021-11-15 DIAGNOSIS — G35 MULTIPLE SCLEROSIS: Primary | ICD-10-CM

## 2021-11-15 PROCEDURE — 96375 TX/PRO/DX INJ NEW DRUG ADDON: CPT

## 2021-11-15 PROCEDURE — 63600175 PHARM REV CODE 636 W HCPCS: Performed by: PSYCHIATRY & NEUROLOGY

## 2021-11-15 PROCEDURE — 96365 THER/PROPH/DIAG IV INF INIT: CPT

## 2021-11-15 PROCEDURE — 96367 TX/PROPH/DG ADDL SEQ IV INF: CPT

## 2021-11-15 PROCEDURE — 25000003 PHARM REV CODE 250: Performed by: PSYCHIATRY & NEUROLOGY

## 2021-11-15 PROCEDURE — 96366 THER/PROPH/DIAG IV INF ADDON: CPT

## 2021-11-15 RX ORDER — EPINEPHRINE 0.3 MG/.3ML
0.3 INJECTION SUBCUTANEOUS
Status: DISCONTINUED | OUTPATIENT
Start: 2021-11-15 | End: 2021-11-15 | Stop reason: HOSPADM

## 2021-11-15 RX ORDER — SODIUM CHLORIDE 0.9 % (FLUSH) 0.9 %
10 SYRINGE (ML) INJECTION
Status: CANCELLED | OUTPATIENT
Start: 2022-04-25

## 2021-11-15 RX ORDER — FAMOTIDINE 10 MG/ML
20 INJECTION INTRAVENOUS
Status: CANCELLED | OUTPATIENT
Start: 2022-04-25

## 2021-11-15 RX ORDER — DIPHENHYDRAMINE HYDROCHLORIDE 50 MG/ML
50 INJECTION INTRAMUSCULAR; INTRAVENOUS
Status: DISCONTINUED | OUTPATIENT
Start: 2021-11-15 | End: 2021-11-15 | Stop reason: HOSPADM

## 2021-11-15 RX ORDER — HEPARIN 100 UNIT/ML
500 SYRINGE INTRAVENOUS
Status: CANCELLED | OUTPATIENT
Start: 2022-04-25

## 2021-11-15 RX ORDER — HEPARIN 100 UNIT/ML
500 SYRINGE INTRAVENOUS
Status: DISCONTINUED | OUTPATIENT
Start: 2021-11-15 | End: 2021-11-15 | Stop reason: HOSPADM

## 2021-11-15 RX ORDER — ACETAMINOPHEN 500 MG
1000 TABLET ORAL
Status: COMPLETED | OUTPATIENT
Start: 2021-11-15 | End: 2021-11-15

## 2021-11-15 RX ORDER — SODIUM CHLORIDE 0.9 % (FLUSH) 0.9 %
10 SYRINGE (ML) INJECTION
Status: DISCONTINUED | OUTPATIENT
Start: 2021-11-15 | End: 2021-11-15 | Stop reason: HOSPADM

## 2021-11-15 RX ORDER — FAMOTIDINE 10 MG/ML
20 INJECTION INTRAVENOUS
Status: COMPLETED | OUTPATIENT
Start: 2021-11-15 | End: 2021-11-15

## 2021-11-15 RX ORDER — ACETAMINOPHEN 500 MG
1000 TABLET ORAL
Status: CANCELLED | OUTPATIENT
Start: 2022-04-25

## 2021-11-15 RX ORDER — EPINEPHRINE 0.3 MG/.3ML
0.3 INJECTION SUBCUTANEOUS
Status: CANCELLED | OUTPATIENT
Start: 2022-04-25

## 2021-11-15 RX ORDER — DIPHENHYDRAMINE HYDROCHLORIDE 50 MG/ML
50 INJECTION INTRAMUSCULAR; INTRAVENOUS
Status: CANCELLED | OUTPATIENT
Start: 2022-04-25

## 2021-11-15 RX ADMIN — OCRELIZUMAB 600 MG: 300 INJECTION INTRAVENOUS at 09:11

## 2021-11-15 RX ADMIN — FAMOTIDINE 20 MG: 10 INJECTION INTRAVENOUS at 08:11

## 2021-11-15 RX ADMIN — SODIUM CHLORIDE: 0.9 INJECTION, SOLUTION INTRAVENOUS at 07:11

## 2021-11-15 RX ADMIN — DEXTROSE: 50 INJECTION, SOLUTION INTRAVENOUS at 08:11

## 2021-11-15 RX ADMIN — ACETAMINOPHEN 1000 MG: 500 TABLET ORAL at 07:11

## 2021-11-15 RX ADMIN — DIPHENHYDRAMINE HYDROCHLORIDE 50 MG: 50 INJECTION INTRAMUSCULAR; INTRAVENOUS at 10:11

## 2021-11-15 RX ADMIN — DIPHENHYDRAMINE HYDROCHLORIDE 50 MG: 50 INJECTION, SOLUTION INTRAMUSCULAR; INTRAVENOUS at 07:11

## 2021-11-21 ENCOUNTER — PATIENT MESSAGE (OUTPATIENT)
Dept: INTERNAL MEDICINE | Facility: CLINIC | Age: 36
End: 2021-11-21
Payer: COMMERCIAL

## 2021-11-21 DIAGNOSIS — F41.9 ANXIETY AND DEPRESSION: ICD-10-CM

## 2021-11-21 DIAGNOSIS — F32.A ANXIETY AND DEPRESSION: ICD-10-CM

## 2021-11-22 RX ORDER — ONDANSETRON 4 MG/1
4 TABLET, ORALLY DISINTEGRATING ORAL EVERY 8 HOURS PRN
Qty: 30 TABLET | Refills: 1 | Status: SHIPPED | OUTPATIENT
Start: 2021-11-22 | End: 2023-04-03 | Stop reason: CLARIF

## 2021-12-21 ENCOUNTER — PATIENT MESSAGE (OUTPATIENT)
Dept: NEUROLOGY | Facility: CLINIC | Age: 36
End: 2021-12-21
Payer: COMMERCIAL

## 2022-01-03 ENCOUNTER — IMMUNIZATION (OUTPATIENT)
Dept: PHARMACY | Facility: CLINIC | Age: 37
End: 2022-01-03
Payer: COMMERCIAL

## 2022-01-03 DIAGNOSIS — Z23 NEED FOR VACCINATION: Primary | ICD-10-CM

## 2022-01-29 DIAGNOSIS — D84.9 IMMUNOSUPPRESSED STATUS: ICD-10-CM

## 2022-02-04 DIAGNOSIS — D84.9 IMMUNOSUPPRESSED STATUS: ICD-10-CM

## 2022-02-06 DIAGNOSIS — D84.9 IMMUNOSUPPRESSED STATUS: ICD-10-CM

## 2022-02-28 ENCOUNTER — PATIENT MESSAGE (OUTPATIENT)
Dept: PSYCHIATRY | Facility: CLINIC | Age: 37
End: 2022-02-28
Payer: COMMERCIAL

## 2022-02-28 ENCOUNTER — PATIENT MESSAGE (OUTPATIENT)
Dept: ADMINISTRATIVE | Facility: OTHER | Age: 37
End: 2022-02-28
Payer: COMMERCIAL

## 2022-02-28 ENCOUNTER — PATIENT OUTREACH (OUTPATIENT)
Dept: ADMINISTRATIVE | Facility: OTHER | Age: 37
End: 2022-02-28
Payer: COMMERCIAL

## 2022-02-28 NOTE — PROGRESS NOTES
Care Everywhere: updated  Immunization: updated  Health Maintenance: updated  Media Review:   Legacy Review:   DIS:  Order placed:   Upcoming appts:  EFAX:  Task Tickets:Scheduling ticket to schedule annual pcp visit sent to patient's portal   Referrals:

## 2022-03-02 ENCOUNTER — OFFICE VISIT (OUTPATIENT)
Dept: OBSTETRICS AND GYNECOLOGY | Facility: CLINIC | Age: 37
End: 2022-03-02
Payer: COMMERCIAL

## 2022-03-02 VITALS
HEIGHT: 65 IN | BODY MASS INDEX: 24.06 KG/M2 | DIASTOLIC BLOOD PRESSURE: 68 MMHG | WEIGHT: 144.38 LBS | SYSTOLIC BLOOD PRESSURE: 122 MMHG

## 2022-03-02 DIAGNOSIS — Z01.419 WELL WOMAN EXAM WITH ROUTINE GYNECOLOGICAL EXAM: Primary | ICD-10-CM

## 2022-03-02 DIAGNOSIS — Z12.4 PAP SMEAR FOR CERVICAL CANCER SCREENING: ICD-10-CM

## 2022-03-02 DIAGNOSIS — Z30.41 ENCOUNTER FOR SURVEILLANCE OF CONTRACEPTIVE PILLS: ICD-10-CM

## 2022-03-02 PROCEDURE — 87624 HPV HI-RISK TYP POOLED RSLT: CPT | Performed by: NURSE PRACTITIONER

## 2022-03-02 PROCEDURE — 1159F MED LIST DOCD IN RCRD: CPT | Mod: CPTII,S$GLB,, | Performed by: NURSE PRACTITIONER

## 2022-03-02 PROCEDURE — 3008F BODY MASS INDEX DOCD: CPT | Mod: CPTII,S$GLB,, | Performed by: NURSE PRACTITIONER

## 2022-03-02 PROCEDURE — 3074F PR MOST RECENT SYSTOLIC BLOOD PRESSURE < 130 MM HG: ICD-10-PCS | Mod: CPTII,S$GLB,, | Performed by: NURSE PRACTITIONER

## 2022-03-02 PROCEDURE — 1159F PR MEDICATION LIST DOCUMENTED IN MEDICAL RECORD: ICD-10-PCS | Mod: CPTII,S$GLB,, | Performed by: NURSE PRACTITIONER

## 2022-03-02 PROCEDURE — 3074F SYST BP LT 130 MM HG: CPT | Mod: CPTII,S$GLB,, | Performed by: NURSE PRACTITIONER

## 2022-03-02 PROCEDURE — 99395 PR PREVENTIVE VISIT,EST,18-39: ICD-10-PCS | Mod: S$GLB,,, | Performed by: NURSE PRACTITIONER

## 2022-03-02 PROCEDURE — 99999 PR PBB SHADOW E&M-EST. PATIENT-LVL III: CPT | Mod: PBBFAC,,, | Performed by: NURSE PRACTITIONER

## 2022-03-02 PROCEDURE — 3078F PR MOST RECENT DIASTOLIC BLOOD PRESSURE < 80 MM HG: ICD-10-PCS | Mod: CPTII,S$GLB,, | Performed by: NURSE PRACTITIONER

## 2022-03-02 PROCEDURE — 99999 PR PBB SHADOW E&M-EST. PATIENT-LVL III: ICD-10-PCS | Mod: PBBFAC,,, | Performed by: NURSE PRACTITIONER

## 2022-03-02 PROCEDURE — 3008F PR BODY MASS INDEX (BMI) DOCUMENTED: ICD-10-PCS | Mod: CPTII,S$GLB,, | Performed by: NURSE PRACTITIONER

## 2022-03-02 PROCEDURE — 3078F DIAST BP <80 MM HG: CPT | Mod: CPTII,S$GLB,, | Performed by: NURSE PRACTITIONER

## 2022-03-02 PROCEDURE — 88175 CYTOPATH C/V AUTO FLUID REDO: CPT | Performed by: NURSE PRACTITIONER

## 2022-03-02 PROCEDURE — 99395 PREV VISIT EST AGE 18-39: CPT | Mod: S$GLB,,, | Performed by: NURSE PRACTITIONER

## 2022-03-02 RX ORDER — DROSPIRENONE AND ETHINYL ESTRADIOL 0.02-3(28)
1 KIT ORAL DAILY
Qty: 84 TABLET | Refills: 0 | Status: SHIPPED | OUTPATIENT
Start: 2022-03-02 | End: 2022-10-31 | Stop reason: SDUPTHER

## 2022-03-02 NOTE — PROGRESS NOTES
CC: Annual  HPI: Pt is a 37 y.o.  female who presents for routine annual exam. She uses ocps for contraception. She does not want STD screening. Planning to TTC in early May after her next infusion. Plans to continue ocps until then and has a 3 month window she will try for. Curious what her period will do- currently does not get one with pills since she skips placebo pills. On ocps since the age of 16. Already taking a prenatal vitamin.     NOTES FROM LAST VISIT WITH ME IN -  CC: Annual  HPI: Pt is a 35 y.o.  female who presents for routine annual exam. She uses OCPs for contraception. She does not want STD screening. Changed medications for MS-now getting Ocrevus infusions every 6 months. Next injection is in November. Overall likes it but has noticed an increase of her depression/anxiety. History of anxiety as an adult-has a therapist and is seeing her today. She has never been on any antidepressants before but is open to the idea. Feels like she is very emotional- has noticed increased anxiety. Pregnancy is still on hold-needs refill of OCPs. C/o low sex drive. Last seen by me in 2019 for breast pain-never got imaging but pain resolved.      FH:   Breast cancer: paternal grandmother-dx in her 60s  Colon cancer: none  Ovarian cancer: none  Uterine cancer: none  HPV vaccine: no  COVID vaccine: yes    Last pap smear:  nilm, hpv negative  History of abnormal pap smears: no  Colonoscopy: na  DEXA: na  Mammogram: na  STD history: no  Birth control: ocp  OB history:   Tobacco use: no     ROS:  GENERAL: Feeling well overall. Denies fever or chills.   SKIN: Denies rash or lesions.   HEAD: Denies head injury or headache.   NODES: Denies enlarged lymph nodes.   CHEST: Denies chest pain or shortness of breath.   CARDIOVASCULAR: Denies palpitations or left sided chest pain.   ABDOMEN: No abdominal pain, constipation, diarrhea, nausea, vomiting or rectal bleeding.   URINARY: No dysuria, hematuria, or  burning on urination.  REPRODUCTIVE: See HPI.   BREASTS: Denies pain, lumps, or nipple discharge.   HEMATOLOGIC: No easy bruisability or excessive bleeding.   MUSCULOSKELETAL: Denies joint pain or swelling.   NEUROLOGIC: Denies syncope or weakness.   PSYCHIATRIC: Denies depression, anxiety or mood swings.    PE:   APPEARANCE: Well nourished, well developed, White female in no acute distress.  NODES: no cervical, supraclavicular, or inguinal lymphadenopathy  BREASTS: Symmetrical, no skin changes or visible lesions. No palpable masses, nipple discharge or adenopathy bilaterally.  ABDOMEN: Soft. No tenderness or masses. No distention. No hernias palpated. No CVA tenderness.  VULVA: No lesions. Normal external female genitalia.  URETHRAL MEATUS: Normal size and location, no lesions, no prolapse.  URETHRA: No masses, tenderness, or prolapse.  VAGINA: Moist. No lesions or lacerations noted. No abnormal discharge present. No odor present.   CERVIX: No lesions or discharge. No cervical motion tenderness.   UTERUS: Normal size, regular shape, mobile, non-tender.  ADNEXA: No tenderness. No fullness or masses palpated in the adnexal regions.   ANUS PERINEUM: Normal.      Diagnosis:  1. Well woman exam with routine gynecological exam    2. Encounter for surveillance of contraceptive pills    3. Pap smear for cervical cancer screening        Plan:     Orders Placed This Encounter    HPV High Risk Genotypes, PCR    Liquid-Based Pap Smear, Screening    drospirenone-ethinyl estradioL (MARCE) 3-0.02 mg per tablet     1. Pap updated  2. Mammogram next year  3. C/w ocps and prenatal     Patient was counseled today on the new ACS guidelines for cervical cytology screening as well as the current recommendations for breast cancer screening. She was counseled to follow up with her PCP for other routine health maintenance. Counseling session lasted approximately 10 minutes, and all her questions were answered.  For women over the age of  65, you can stop having cervical cancer screenings if you have never had abnormal cervical cells or cervical cancer, and youve had three negative Pap tests in a row. (You also can stop screening if youve had two negative Pap and HPV tests in a row in the past 10 years, with at least one test in the past 5 years.),    Follow-up with me in 1 year for routine exam; pap in 3 years.

## 2022-04-10 ENCOUNTER — TELEPHONE (OUTPATIENT)
Dept: NEUROLOGY | Facility: CLINIC | Age: 37
End: 2022-04-10

## 2022-04-10 DIAGNOSIS — G35 MULTIPLE SCLEROSIS: Primary | ICD-10-CM

## 2022-04-18 ENCOUNTER — LAB VISIT (OUTPATIENT)
Dept: LAB | Facility: HOSPITAL | Age: 37
End: 2022-04-18
Attending: CLINICAL NURSE SPECIALIST
Payer: COMMERCIAL

## 2022-04-18 DIAGNOSIS — G35 MULTIPLE SCLEROSIS: ICD-10-CM

## 2022-04-18 LAB
BASOPHILS # BLD AUTO: 0.03 K/UL (ref 0–0.2)
BASOPHILS NFR BLD: 0.4 % (ref 0–1.9)
DIFFERENTIAL METHOD: ABNORMAL
EOSINOPHIL # BLD AUTO: 0.1 K/UL (ref 0–0.5)
EOSINOPHIL NFR BLD: 0.7 % (ref 0–8)
ERYTHROCYTE [DISTWIDTH] IN BLOOD BY AUTOMATED COUNT: 11.9 % (ref 11.5–14.5)
HCT VFR BLD AUTO: 34.9 % (ref 37–48.5)
HGB BLD-MCNC: 11.9 G/DL (ref 12–16)
IMM GRANULOCYTES # BLD AUTO: 0.03 K/UL (ref 0–0.04)
IMM GRANULOCYTES NFR BLD AUTO: 0.4 % (ref 0–0.5)
LYMPHOCYTES # BLD AUTO: 2.7 K/UL (ref 1–4.8)
LYMPHOCYTES NFR BLD: 35.9 % (ref 18–48)
MCH RBC QN AUTO: 34.1 PG (ref 27–31)
MCHC RBC AUTO-ENTMCNC: 34.1 G/DL (ref 32–36)
MCV RBC AUTO: 100 FL (ref 82–98)
MONOCYTES # BLD AUTO: 0.3 K/UL (ref 0.3–1)
MONOCYTES NFR BLD: 3.9 % (ref 4–15)
NEUTROPHILS # BLD AUTO: 4.4 K/UL (ref 1.8–7.7)
NEUTROPHILS NFR BLD: 58.7 % (ref 38–73)
NRBC BLD-RTO: 0 /100 WBC
PLATELET # BLD AUTO: 151 K/UL (ref 150–450)
PMV BLD AUTO: 11.1 FL (ref 9.2–12.9)
RBC # BLD AUTO: 3.49 M/UL (ref 4–5.4)
WBC # BLD AUTO: 7.43 K/UL (ref 3.9–12.7)

## 2022-04-18 PROCEDURE — 86704 HEP B CORE ANTIBODY TOTAL: CPT | Performed by: CLINICAL NURSE SPECIALIST

## 2022-04-18 PROCEDURE — 36415 COLL VENOUS BLD VENIPUNCTURE: CPT | Mod: PO | Performed by: CLINICAL NURSE SPECIALIST

## 2022-04-18 PROCEDURE — 85025 COMPLETE CBC W/AUTO DIFF WBC: CPT | Performed by: CLINICAL NURSE SPECIALIST

## 2022-04-18 PROCEDURE — 87340 HEPATITIS B SURFACE AG IA: CPT | Performed by: CLINICAL NURSE SPECIALIST

## 2022-04-18 PROCEDURE — 86706 HEP B SURFACE ANTIBODY: CPT | Performed by: CLINICAL NURSE SPECIALIST

## 2022-04-25 NOTE — TELEPHONE ENCOUNTER
WBC=7.43; TFW=6990  Hep B Core Ab negative, Surface Ab positive, Surface Ag negative (past vaccination)    Ok to proceed with Ocrevus infusion.

## 2022-04-29 RX ORDER — EPINEPHRINE 0.3 MG/.3ML
0.3 INJECTION SUBCUTANEOUS
Status: CANCELLED | OUTPATIENT
Start: 2022-05-01

## 2022-04-29 RX ORDER — ACETAMINOPHEN 500 MG
1000 TABLET ORAL
Status: CANCELLED | OUTPATIENT
Start: 2022-05-01

## 2022-04-29 RX ORDER — HEPARIN 100 UNIT/ML
500 SYRINGE INTRAVENOUS
Status: CANCELLED | OUTPATIENT
Start: 2022-05-01

## 2022-04-29 RX ORDER — SODIUM CHLORIDE 0.9 % (FLUSH) 0.9 %
10 SYRINGE (ML) INJECTION
Status: CANCELLED | OUTPATIENT
Start: 2022-05-01

## 2022-04-29 RX ORDER — FAMOTIDINE 10 MG/ML
20 INJECTION INTRAVENOUS
Status: CANCELLED | OUTPATIENT
Start: 2022-05-01

## 2022-04-29 RX ORDER — DIPHENHYDRAMINE HYDROCHLORIDE 50 MG/ML
50 INJECTION INTRAMUSCULAR; INTRAVENOUS
Status: CANCELLED | OUTPATIENT
Start: 2022-05-01

## 2022-05-09 ENCOUNTER — OFFICE VISIT (OUTPATIENT)
Dept: NEUROLOGY | Facility: CLINIC | Age: 37
End: 2022-05-09
Payer: COMMERCIAL

## 2022-05-09 ENCOUNTER — LAB VISIT (OUTPATIENT)
Dept: LAB | Facility: HOSPITAL | Age: 37
End: 2022-05-09
Payer: COMMERCIAL

## 2022-05-09 VITALS
SYSTOLIC BLOOD PRESSURE: 104 MMHG | DIASTOLIC BLOOD PRESSURE: 68 MMHG | BODY MASS INDEX: 24.6 KG/M2 | HEART RATE: 67 BPM | WEIGHT: 147.63 LBS | HEIGHT: 65 IN

## 2022-05-09 DIAGNOSIS — G35 MULTIPLE SCLEROSIS: ICD-10-CM

## 2022-05-09 DIAGNOSIS — Z29.89 PROPHYLACTIC IMMUNOTHERAPY: ICD-10-CM

## 2022-05-09 DIAGNOSIS — Z79.899 HIGH RISK MEDICATION USE: ICD-10-CM

## 2022-05-09 DIAGNOSIS — Z71.89 COUNSELING REGARDING GOALS OF CARE: ICD-10-CM

## 2022-05-09 DIAGNOSIS — G35 MULTIPLE SCLEROSIS: Primary | ICD-10-CM

## 2022-05-09 LAB
IGA SERPL-MCNC: 113 MG/DL (ref 40–350)
IGG SERPL-MCNC: 1160 MG/DL (ref 650–1600)
IGM SERPL-MCNC: 34 MG/DL (ref 50–300)

## 2022-05-09 PROCEDURE — 99999 PR PBB SHADOW E&M-EST. PATIENT-LVL IV: CPT | Mod: PBBFAC,,, | Performed by: CLINICAL NURSE SPECIALIST

## 2022-05-09 PROCEDURE — 3078F DIAST BP <80 MM HG: CPT | Mod: CPTII,S$GLB,, | Performed by: CLINICAL NURSE SPECIALIST

## 2022-05-09 PROCEDURE — 1159F MED LIST DOCD IN RCRD: CPT | Mod: CPTII,S$GLB,, | Performed by: CLINICAL NURSE SPECIALIST

## 2022-05-09 PROCEDURE — 3008F PR BODY MASS INDEX (BMI) DOCUMENTED: ICD-10-PCS | Mod: CPTII,S$GLB,, | Performed by: CLINICAL NURSE SPECIALIST

## 2022-05-09 PROCEDURE — 82784 ASSAY IGA/IGD/IGG/IGM EACH: CPT | Performed by: CLINICAL NURSE SPECIALIST

## 2022-05-09 PROCEDURE — 1159F PR MEDICATION LIST DOCUMENTED IN MEDICAL RECORD: ICD-10-PCS | Mod: CPTII,S$GLB,, | Performed by: CLINICAL NURSE SPECIALIST

## 2022-05-09 PROCEDURE — 99214 PR OFFICE/OUTPT VISIT, EST, LEVL IV, 30-39 MIN: ICD-10-PCS | Mod: S$GLB,,, | Performed by: CLINICAL NURSE SPECIALIST

## 2022-05-09 PROCEDURE — 3074F SYST BP LT 130 MM HG: CPT | Mod: CPTII,S$GLB,, | Performed by: CLINICAL NURSE SPECIALIST

## 2022-05-09 PROCEDURE — 3008F BODY MASS INDEX DOCD: CPT | Mod: CPTII,S$GLB,, | Performed by: CLINICAL NURSE SPECIALIST

## 2022-05-09 PROCEDURE — 99999 PR PBB SHADOW E&M-EST. PATIENT-LVL IV: ICD-10-PCS | Mod: PBBFAC,,, | Performed by: CLINICAL NURSE SPECIALIST

## 2022-05-09 PROCEDURE — 3074F PR MOST RECENT SYSTOLIC BLOOD PRESSURE < 130 MM HG: ICD-10-PCS | Mod: CPTII,S$GLB,, | Performed by: CLINICAL NURSE SPECIALIST

## 2022-05-09 PROCEDURE — 3078F PR MOST RECENT DIASTOLIC BLOOD PRESSURE < 80 MM HG: ICD-10-PCS | Mod: CPTII,S$GLB,, | Performed by: CLINICAL NURSE SPECIALIST

## 2022-05-09 PROCEDURE — 99214 OFFICE O/P EST MOD 30 MIN: CPT | Mod: S$GLB,,, | Performed by: CLINICAL NURSE SPECIALIST

## 2022-05-09 PROCEDURE — 36415 COLL VENOUS BLD VENIPUNCTURE: CPT | Performed by: CLINICAL NURSE SPECIALIST

## 2022-05-09 NOTE — PROGRESS NOTES
Subjective:          Patient ID: King Perez is a 37 y.o. female who presents today for a routine clinic visit for MS.  She was last seen by Dr. Hernandez in November. The history has been provided by the patient.     MS HPI:  · DMT: ocrelizumab last received on 11/15/21; due this month   · Side effects from DMT? Some nausea the day after the infusion  Taking vitamin D3 as recommended? 10,000 units daily   She feels like she has gotten sick a lot more recently--colds, sinus infection, cough; not sure if these are allergies. She lost her voice for 3 weeks this spring. She had COVID in September, but recovered well.   She has felt more tired in the past few weeks. She has felt like this in the past leading up to her Ocrevus infusion. She has also had more brain fog than usual. She notices a difference in her handwriting leading up to the infusion.   She wakes up a lot at night, but can get back to sleep easily.   She is exercising regularly--walking and doing yoga; runs, except for the past few weeks.   She plans to try to conceive in August.     Medications:  Current Outpatient Medications   Medication Sig    B complex-C-E-Zn Tab Take 1 tablet by mouth once daily.    baclofen (LIORESAL) 10 MG tablet Take 1 tablet (10 mg total) by mouth nightly as needed (muscle spasm).    drospirenone-ethinyl estradioL (MARCE) 3-0.02 mg per tablet Take 1 tablet by mouth once daily.    ocrelizumab (OCREVUS IV) Inject into the vein.    ondansetron (ZOFRAN-ODT) 4 MG TbDL Take 1 tablet (4 mg total) by mouth every 8 (eight) hours as needed (nausea).    sertraline (ZOLOFT) 25 MG tablet TAKE 1 TABLET(25 MG) BY MOUTH EVERY DAY    valACYclovir (VALTREX) 1000 MG tablet Take 1 tablet (1,000 mg total) by mouth 2 (two) times a day.    vitamin D 1000 units Tab Take 5,000 Units by mouth once daily.          SOCIAL HISTORY  Social History     Tobacco Use    Smoking status: Never Smoker    Smokeless tobacco: Never Used   Substance Use  Topics    Alcohol use: Yes     Comment: socially    Drug use: No       Living arrangements - the patient lives with their spouse.    ROS:  REVIEW OF SYMPTOMS 5/9/22   Do you feel abnormally tired on most days? No--more fatigued lately, but can push through it   Do you feel you generally sleep well? Yes   Do you have difficulty controlling your bladder?  No   Do you have difficulty controlling your bowels?  No   Do you have frequent muscle cramps, tightness or spasms in your limbs?  Yes--takes magnesium, which does seem to help. She notices when she doesn't take it.    Do you have new visual symptoms?  Yes   Do you have worsening difficulty with your memory or thinking? Yes--she feels like she has more short-term memory issues, but takes measures to improve this.    Do you have worsening symptoms of anxiety or depression?  No--mood has been good    For patients who walk, Do you have more difficulty walking?  No   Have you fallen since your last visit?  No   For patients who use wheelchairs: Do you have any skin wounds or breakdown? Not Applicable   Do you have difficulty using your hands?  No   Do you have shooting or burning pain? No   Do you have difficulty with sexual function?  No   If you are sexually active, are you using birth control? Y/N  N/A Yes   Do you often choke when swallowing liquids or solid food?  No   Do you experience worsening symptoms when overheated? No--has been worse lately; she feels more sensitive to cold than heat; she does hot yoga, which she feels is helpful for joint pain    Do you need any new equipment such as a wheelchair, walker or shower chair? No   Do you receive co-pay financial assistance for your principal MS medicine? Yes   Would you be interested in participating in an MS research trial in the future? No   For patients on Gilenya, Tecfidera, Aubagio, Rituxan, Ocrevus, Tysabri, Lemtrada or Methotrexate, are you aware that you should NOT receive live virus vaccines?  Yes   Do  you feel you have adequate family/friend support?  Yes   Do you have health insurance?   Yes   Are you currently employed? Yes   Do you receive SSDI/SSI?  Not Applicable   Do you use marijuana or cannabis products? Yes   How often? Monthly   Have you been diagnosed with a urinary tract infection since your last visit here? No   Have you been diagnosed with a respiratory tract infection since your last visit here? No   Have you been to the emergency room since your last visit here? No   Have you been hospitalized since your last visit here?  No            Objective:        1. 25 foot timed walk:  Timed 25 Foot Walk: 11/3/2021 5/9/2022   Did patient wear an AFO? No No   Was assistive device used? No No   Time for 25 Foot Walk (seconds) 4.09 3.5   Time for 25 Foot Walk (seconds) - 3.89       Neurologic Exam     Mental Status   Oriented to person, place, and time.   Attention: normal. Concentration: normal.   Speech: speech is normal   Level of consciousness: alert  Knowledge: good.   Normal comprehension.     Cranial Nerves     CN II   Visual acuity: (20/20 OD, 20/20 OS with contacts )    CN III, IV, VI   Pupils are equal, round, and reactive to light.  Extraocular motions are normal.     CN V   Right facial sensation deficit: none  Left facial sensation deficit: none    CN VII   Right facial weakness: none  Left facial weakness: none    CN VIII   Hearing: intact    CN IX, X   Palate: symmetric    CN XI   Right sternocleidomastoid strength: normal  Left sternocleidomastoid strength: normal  Right trapezius strength: normal  Left trapezius strength: normal    CN XII   Tongue deviation: none    Motor Exam     Strength   Right neck flexion: 5/5  Left neck flexion: 5/5  Right neck extension: 5/5  Left neck extension: 5/5  Right deltoid: 5/5  Left deltoid: 5/5  Right biceps: 5/5  Left biceps: 5/5  Right triceps: 5/5  Left triceps: 5/5  Right wrist flexion: 5/5  Left wrist flexion: 5/5  Right wrist extension: 5/5  Left wrist  extension: 5/5  Right interossei: 5/5  Left interossei: 5/5  Right iliopsoas: 5/5  Left iliopsoas: 5/5  Right quadriceps: 5/5  Left quadriceps: 5/5  Right hamstrin/5  Left hamstrin/5  Right anterior tibial: 5/5  Left anterior tibial: 5/5  Right gastroc: 5/5  Left gastroc: 5/5    Sensory Exam   Right arm vibration: normal  Left arm vibration: normal  Right leg vibration: normal  Left leg vibration: normal    Gait, Coordination, and Reflexes     Gait  Gait: normal    Coordination   Romberg: negative  Finger to nose coordination: normal  Heel to shin coordination: normal  Tandem walking coordination: normal    Reflexes   Right brachioradialis: 1+  Left brachioradialis: 1+  Right biceps: 1+  Left biceps: 1+  Right triceps: 1+  Left triceps: 1+  Right patellar: 1+  Left patellar: 1+  Right achilles: 1+  Left achilles: 1+  Right plantar: normal  Left plantar: normal  Normal rapid sequential movements in upper extremities.   She can walk on toes and heels and hop on each foot ten times.        Imaging:     Results for orders placed during the hospital encounter of 21    MRI Brain Demyelinating Without Contrast    Impression  No significant change from prior with continued few scattered punctate foci of T2 FLAIR signal hyperintensity throughout the brain parenchyma while nonspecific remain concerning for mild degree of prior demyelinating plaque burden.    No definite new lesion to suggest significant interval or active demyelination.    Clinical correlation and follow-up advised.      Electronically signed by: Dennis Palmer DO  Date:    2021  Time:    11:19        Labs:     Lab Results   Component Value Date    PNKGNJVK35EZ 81 10/25/2021    HZWLIPUM40SF 59 10/21/2020    BTAOKFVN69ZV 58 2019     Lab Results   Component Value Date    JCVINDEX 1.52 (A) 2020    JCVANTIBODY Positive (A) 2020     Lab Results   Component Value Date    GT8EEAAK 73.8 2020    ABSOLUTECD3 1007.0 2020     IV6TPIVJ 17.1 02/06/2020    ABSOLUTECD8 234.0 02/06/2020    GL0COPMY 55.7 02/06/2020    ABSOLUTECD4 761.0 02/06/2020    LABCD48 3.25 02/06/2020     Lab Results   Component Value Date    WBC 7.43 04/18/2022    RBC 3.49 (L) 04/18/2022    HGB 11.9 (L) 04/18/2022    HCT 34.9 (L) 04/18/2022     (H) 04/18/2022    MCH 34.1 (H) 04/18/2022    MCHC 34.1 04/18/2022    RDW 11.9 04/18/2022     04/18/2022    MPV 11.1 04/18/2022    GRAN 4.4 04/18/2022    GRAN 58.7 04/18/2022    LYMPH 2.7 04/18/2022    LYMPH 35.9 04/18/2022    MONO 0.3 04/18/2022    MONO 3.9 (L) 04/18/2022    EOS 0.1 04/18/2022    BASO 0.03 04/18/2022    EOSINOPHIL 0.7 04/18/2022    BASOPHIL 0.4 04/18/2022     Sodium   Date Value Ref Range Status   11/03/2021 137 136 - 145 mmol/L Final     Potassium   Date Value Ref Range Status   10/25/2021 3.8 3.5 - 5.1 mmol/L Final     Chloride   Date Value Ref Range Status   10/25/2021 99 95 - 110 mmol/L Final     CO2   Date Value Ref Range Status   10/25/2021 22 (L) 23 - 29 mmol/L Final     Glucose   Date Value Ref Range Status   10/25/2021 105 70 - 110 mg/dL Final     BUN   Date Value Ref Range Status   10/25/2021 8 6 - 20 mg/dL Final     Creatinine   Date Value Ref Range Status   10/25/2021 0.7 0.5 - 1.4 mg/dL Final     Calcium   Date Value Ref Range Status   10/25/2021 9.7 8.7 - 10.5 mg/dL Final     Total Protein   Date Value Ref Range Status   10/25/2021 7.3 6.0 - 8.4 g/dL Final     Albumin   Date Value Ref Range Status   10/25/2021 3.9 3.5 - 5.2 g/dL Final     Total Bilirubin   Date Value Ref Range Status   10/25/2021 0.6 0.1 - 1.0 mg/dL Final     Comment:     For infants and newborns, interpretation of results should be based  on gestational age, weight and in agreement with clinical  observations.    Premature Infant recommended reference ranges:  Up to 24 hours.............<8.0 mg/dL  Up to 48 hours............<12.0 mg/dL  3-5 days..................<15.0 mg/dL  6-29 days.................<15.0 mg/dL        Alkaline Phosphatase   Date Value Ref Range Status   10/25/2021 42 (L) 55 - 135 U/L Final     AST   Date Value Ref Range Status   10/25/2021 22 10 - 40 U/L Final     ALT   Date Value Ref Range Status   10/25/2021 15 10 - 44 U/L Final     Anion Gap   Date Value Ref Range Status   10/25/2021 10 8 - 16 mmol/L Final     eGFR if    Date Value Ref Range Status   10/25/2021 >60.0 >60 mL/min/1.73 m^2 Final     eGFR if non    Date Value Ref Range Status   10/25/2021 >60.0 >60 mL/min/1.73 m^2 Final     Comment:     Calculation used to obtain the estimated glomerular filtration  rate (eGFR) is the CKD-EPI equation.        Lab Results   Component Value Date    HEPBSAG Negative 04/18/2022    HEPBSAB Positive 04/18/2022    HEPBCAB Negative 04/18/2022           MS Impression and Plan:     NEURO MULTIPLE SCLEROSIS IMPRESSION:   MS Status:     Number of relapses in the past year?:  0    Clinical Progression:  Clinically Stable    MRI Progression:  Stable  Plan:     DMT:  No change in management    DMT comment:  Continue Ocrevus. Her infusion is scheduled for next Monday. We will check immunoglobulins today. She is aware of the risks associated with immunosuppressant therapy, including increased risk of infection.   Discussed Hiren; referral entered.         Symptom Management:  No change in symptom management       MRIs of brain and spine planned for November.   She will follow up with Dr. Hernandez after MRIs.   If she does conceive between the late summer and the next infusion, we will cancel MRIs and hold next infusion.     Total time spent with patient: 26 minutes   Total time spent on encounter: 36 minutes       Problem List Items Addressed This Visit        Neurologic Problems    Multiple sclerosis - Primary    Relevant Orders    Immunoglobulins (IgG, IgA, IgM) Quantitative    MRI Brain Demyelinating Without Contrast    MRI Cervical Spine Demyelinating Without Contrast    MRI Thoracic Spine  Demyelinating Without Contrast    Ambulatory referral/consult Order for Hiren Fierro, APRN, CNS

## 2022-05-10 ENCOUNTER — PATIENT MESSAGE (OUTPATIENT)
Dept: NEUROLOGY | Facility: CLINIC | Age: 37
End: 2022-05-10
Payer: COMMERCIAL

## 2022-05-16 ENCOUNTER — INFUSION (OUTPATIENT)
Dept: INFUSION THERAPY | Facility: HOSPITAL | Age: 37
End: 2022-05-16
Attending: PSYCHIATRY & NEUROLOGY
Payer: COMMERCIAL

## 2022-05-16 VITALS
RESPIRATION RATE: 17 BRPM | DIASTOLIC BLOOD PRESSURE: 64 MMHG | HEART RATE: 69 BPM | SYSTOLIC BLOOD PRESSURE: 110 MMHG | TEMPERATURE: 98 F | OXYGEN SATURATION: 98 %

## 2022-05-16 DIAGNOSIS — G35 MULTIPLE SCLEROSIS: Primary | ICD-10-CM

## 2022-05-16 PROCEDURE — 96413 CHEMO IV INFUSION 1 HR: CPT

## 2022-05-16 PROCEDURE — 96367 TX/PROPH/DG ADDL SEQ IV INF: CPT

## 2022-05-16 PROCEDURE — 96365 THER/PROPH/DIAG IV INF INIT: CPT

## 2022-05-16 PROCEDURE — 63600175 PHARM REV CODE 636 W HCPCS: Mod: JG | Performed by: PSYCHIATRY & NEUROLOGY

## 2022-05-16 PROCEDURE — 96375 TX/PRO/DX INJ NEW DRUG ADDON: CPT

## 2022-05-16 PROCEDURE — 96415 CHEMO IV INFUSION ADDL HR: CPT

## 2022-05-16 PROCEDURE — 25000003 PHARM REV CODE 250: Performed by: PSYCHIATRY & NEUROLOGY

## 2022-05-16 RX ORDER — SODIUM CHLORIDE 0.9 % (FLUSH) 0.9 %
10 SYRINGE (ML) INJECTION
Status: DISCONTINUED | OUTPATIENT
Start: 2022-05-16 | End: 2022-05-16 | Stop reason: HOSPADM

## 2022-05-16 RX ORDER — DIPHENHYDRAMINE HYDROCHLORIDE 50 MG/ML
50 INJECTION INTRAMUSCULAR; INTRAVENOUS
Status: DISCONTINUED | OUTPATIENT
Start: 2022-05-16 | End: 2022-05-16 | Stop reason: HOSPADM

## 2022-05-16 RX ORDER — FAMOTIDINE 10 MG/ML
20 INJECTION INTRAVENOUS
OUTPATIENT
Start: 2022-10-17

## 2022-05-16 RX ORDER — HEPARIN 100 UNIT/ML
500 SYRINGE INTRAVENOUS
Status: DISCONTINUED | OUTPATIENT
Start: 2022-05-16 | End: 2022-05-16 | Stop reason: HOSPADM

## 2022-05-16 RX ORDER — ACETAMINOPHEN 500 MG
1000 TABLET ORAL
Status: COMPLETED | OUTPATIENT
Start: 2022-05-16 | End: 2022-05-16

## 2022-05-16 RX ORDER — EPINEPHRINE 0.3 MG/.3ML
0.3 INJECTION SUBCUTANEOUS
OUTPATIENT
Start: 2022-10-17

## 2022-05-16 RX ORDER — EPINEPHRINE 0.3 MG/.3ML
0.3 INJECTION SUBCUTANEOUS
Status: DISCONTINUED | OUTPATIENT
Start: 2022-05-16 | End: 2022-05-16 | Stop reason: HOSPADM

## 2022-05-16 RX ORDER — FAMOTIDINE 10 MG/ML
20 INJECTION INTRAVENOUS
Status: COMPLETED | OUTPATIENT
Start: 2022-05-16 | End: 2022-05-16

## 2022-05-16 RX ORDER — HEPARIN 100 UNIT/ML
500 SYRINGE INTRAVENOUS
OUTPATIENT
Start: 2022-10-17

## 2022-05-16 RX ORDER — SODIUM CHLORIDE 0.9 % (FLUSH) 0.9 %
10 SYRINGE (ML) INJECTION
OUTPATIENT
Start: 2022-10-17

## 2022-05-16 RX ORDER — ACETAMINOPHEN 500 MG
1000 TABLET ORAL
OUTPATIENT
Start: 2022-10-17

## 2022-05-16 RX ORDER — DIPHENHYDRAMINE HYDROCHLORIDE 50 MG/ML
50 INJECTION INTRAMUSCULAR; INTRAVENOUS
OUTPATIENT
Start: 2022-10-17

## 2022-05-16 RX ADMIN — SODIUM CHLORIDE: 0.9 INJECTION, SOLUTION INTRAVENOUS at 08:05

## 2022-05-16 RX ADMIN — DIPHENHYDRAMINE HYDROCHLORIDE 50 MG: 50 INJECTION, SOLUTION INTRAMUSCULAR; INTRAVENOUS at 08:05

## 2022-05-16 RX ADMIN — FAMOTIDINE 20 MG: 10 INJECTION INTRAVENOUS at 09:05

## 2022-05-16 RX ADMIN — ACETAMINOPHEN 1000 MG: 500 TABLET ORAL at 08:05

## 2022-05-16 RX ADMIN — DIPHENHYDRAMINE HYDROCHLORIDE 50 MG: 50 INJECTION, SOLUTION INTRAMUSCULAR; INTRAVENOUS at 11:05

## 2022-05-16 RX ADMIN — OCRELIZUMAB 600 MG: 300 INJECTION INTRAVENOUS at 09:05

## 2022-05-16 RX ADMIN — DEXTROSE: 50 INJECTION, SOLUTION INTRAVENOUS at 08:05

## 2022-05-16 NOTE — PLAN OF CARE
0800  Patient seated in chair, VSS, Assessment done.  PIV inserted without issue, flushed, blood return noted.  Started on NS @ 25 cc/hr KVO while waiting for pre-medications.  WCTM for safety

## 2022-05-16 NOTE — PLAN OF CARE
"[id number]  Patient completed Ocrevus infusion, flush, and 30 minute observation period. Patient tolerated well except for the "scratchy throat symptoms at approx 160cc/hr titration of Ocrevus.  Patient stated that it always happens and goes away with the additional PRN of Benadryl.  Benadryl PRN admin and symptoms subsided. VSS, PIV discontinued.  Patient will monitor My Chart for future appts. States that she has them every 6 mos.  Patient ambulated off floor independently, NAD, to waiting friend in parking lot to take her home.  "

## 2022-06-22 ENCOUNTER — PATIENT MESSAGE (OUTPATIENT)
Dept: NEUROLOGY | Facility: CLINIC | Age: 37
End: 2022-06-22
Payer: COMMERCIAL

## 2022-06-22 DIAGNOSIS — G35 MULTIPLE SCLEROSIS: Primary | ICD-10-CM

## 2022-06-23 ENCOUNTER — PATIENT MESSAGE (OUTPATIENT)
Dept: NEUROLOGY | Facility: CLINIC | Age: 37
End: 2022-06-23
Payer: COMMERCIAL

## 2022-06-23 ENCOUNTER — LAB VISIT (OUTPATIENT)
Dept: LAB | Facility: HOSPITAL | Age: 37
End: 2022-06-23
Attending: CLINICAL NURSE SPECIALIST
Payer: COMMERCIAL

## 2022-06-23 DIAGNOSIS — G35 MULTIPLE SCLEROSIS: ICD-10-CM

## 2022-06-23 LAB
ALBUMIN SERPL BCP-MCNC: 3.4 G/DL (ref 3.5–5.2)
ALP SERPL-CCNC: 40 U/L (ref 55–135)
ALT SERPL W/O P-5'-P-CCNC: 20 U/L (ref 10–44)
ANION GAP SERPL CALC-SCNC: 9 MMOL/L (ref 8–16)
AST SERPL-CCNC: 22 U/L (ref 10–40)
BASOPHILS # BLD AUTO: 0.01 K/UL (ref 0–0.2)
BASOPHILS NFR BLD: 0.3 % (ref 0–1.9)
BILIRUB SERPL-MCNC: 0.4 MG/DL (ref 0.1–1)
BUN SERPL-MCNC: 5 MG/DL (ref 6–20)
CALCIUM SERPL-MCNC: 8.7 MG/DL (ref 8.7–10.5)
CHLORIDE SERPL-SCNC: 105 MMOL/L (ref 95–110)
CO2 SERPL-SCNC: 24 MMOL/L (ref 23–29)
CREAT SERPL-MCNC: 0.7 MG/DL (ref 0.5–1.4)
DIFFERENTIAL METHOD: ABNORMAL
EOSINOPHIL # BLD AUTO: 0.1 K/UL (ref 0–0.5)
EOSINOPHIL NFR BLD: 2.3 % (ref 0–8)
ERYTHROCYTE [DISTWIDTH] IN BLOOD BY AUTOMATED COUNT: 11.8 % (ref 11.5–14.5)
EST. GFR  (AFRICAN AMERICAN): >60 ML/MIN/1.73 M^2
EST. GFR  (NON AFRICAN AMERICAN): >60 ML/MIN/1.73 M^2
GLUCOSE SERPL-MCNC: 107 MG/DL (ref 70–110)
HCT VFR BLD AUTO: 35.3 % (ref 37–48.5)
HGB BLD-MCNC: 11.7 G/DL (ref 12–16)
IMM GRANULOCYTES # BLD AUTO: 0.02 K/UL (ref 0–0.04)
IMM GRANULOCYTES NFR BLD AUTO: 0.5 % (ref 0–0.5)
LYMPHOCYTES # BLD AUTO: 1.3 K/UL (ref 1–4.8)
LYMPHOCYTES NFR BLD: 33.5 % (ref 18–48)
MCH RBC QN AUTO: 34.1 PG (ref 27–31)
MCHC RBC AUTO-ENTMCNC: 33.1 G/DL (ref 32–36)
MCV RBC AUTO: 103 FL (ref 82–98)
MONOCYTES # BLD AUTO: 0.4 K/UL (ref 0.3–1)
MONOCYTES NFR BLD: 9 % (ref 4–15)
NEUTROPHILS # BLD AUTO: 2.2 K/UL (ref 1.8–7.7)
NEUTROPHILS NFR BLD: 54.4 % (ref 38–73)
NRBC BLD-RTO: 0 /100 WBC
PLATELET # BLD AUTO: 120 K/UL (ref 150–450)
PMV BLD AUTO: 11.1 FL (ref 9.2–12.9)
POTASSIUM SERPL-SCNC: 3.9 MMOL/L (ref 3.5–5.1)
PROT SERPL-MCNC: 6.3 G/DL (ref 6–8.4)
RBC # BLD AUTO: 3.43 M/UL (ref 4–5.4)
SODIUM SERPL-SCNC: 138 MMOL/L (ref 136–145)
WBC # BLD AUTO: 4 K/UL (ref 3.9–12.7)

## 2022-06-23 PROCEDURE — 80053 COMPREHEN METABOLIC PANEL: CPT | Performed by: CLINICAL NURSE SPECIALIST

## 2022-06-23 PROCEDURE — 85025 COMPLETE CBC W/AUTO DIFF WBC: CPT | Performed by: CLINICAL NURSE SPECIALIST

## 2022-06-23 PROCEDURE — 36415 COLL VENOUS BLD VENIPUNCTURE: CPT | Mod: PO | Performed by: CLINICAL NURSE SPECIALIST

## 2022-06-24 ENCOUNTER — PATIENT MESSAGE (OUTPATIENT)
Dept: PSYCHIATRY | Facility: CLINIC | Age: 37
End: 2022-06-24
Payer: COMMERCIAL

## 2022-08-25 ENCOUNTER — PATIENT MESSAGE (OUTPATIENT)
Dept: INFECTIOUS DISEASES | Facility: CLINIC | Age: 37
End: 2022-08-25
Payer: COMMERCIAL

## 2022-08-25 DIAGNOSIS — K06.9 DISEASE OF GINGIVA DUE TO RECURRENT ORAL HERPES SIMPLEX VIRUS (HSV) INFECTION: ICD-10-CM

## 2022-08-25 DIAGNOSIS — B00.9 DISEASE OF GINGIVA DUE TO RECURRENT ORAL HERPES SIMPLEX VIRUS (HSV) INFECTION: ICD-10-CM

## 2022-08-29 RX ORDER — VALACYCLOVIR HYDROCHLORIDE 1 G/1
1000 TABLET, FILM COATED ORAL 2 TIMES DAILY
Qty: 60 TABLET | Refills: 5 | Status: SHIPPED | OUTPATIENT
Start: 2022-08-29 | End: 2023-01-09

## 2022-10-02 DIAGNOSIS — G35 MULTIPLE SCLEROSIS: Primary | ICD-10-CM

## 2022-10-05 ENCOUNTER — TELEPHONE (OUTPATIENT)
Dept: NEUROLOGY | Facility: CLINIC | Age: 37
End: 2022-10-05
Payer: COMMERCIAL

## 2022-10-05 NOTE — TELEPHONE ENCOUNTER
Called and left a voicemail for patient to call back and schedule appointment with [facility] labs, contact info was provided.

## 2022-10-06 NOTE — TELEPHONE ENCOUNTER
----- Message from Grace Osman sent at 10/6/2022  4:09 PM CDT -----  Contact: [phone]  Pt is calling to speak to Nadia in regards to a sooner appointment please call and adv @ [phone]

## 2022-10-07 ENCOUNTER — PATIENT MESSAGE (OUTPATIENT)
Dept: NEUROLOGY | Facility: CLINIC | Age: 37
End: 2022-10-07
Payer: COMMERCIAL

## 2022-10-10 ENCOUNTER — OFFICE VISIT (OUTPATIENT)
Dept: NEUROLOGY | Facility: CLINIC | Age: 37
End: 2022-10-10
Payer: COMMERCIAL

## 2022-10-10 DIAGNOSIS — Z29.89 PROPHYLACTIC IMMUNOTHERAPY: ICD-10-CM

## 2022-10-10 DIAGNOSIS — Z71.89 COUNSELING REGARDING GOALS OF CARE: ICD-10-CM

## 2022-10-10 DIAGNOSIS — Z79.899 HIGH RISK MEDICATION USE: ICD-10-CM

## 2022-10-10 DIAGNOSIS — G35 MULTIPLE SCLEROSIS: Primary | ICD-10-CM

## 2022-10-10 PROCEDURE — 99215 OFFICE O/P EST HI 40 MIN: CPT | Mod: 95,,, | Performed by: CLINICAL NURSE SPECIALIST

## 2022-10-10 PROCEDURE — 1159F MED LIST DOCD IN RCRD: CPT | Mod: CPTII,95,, | Performed by: CLINICAL NURSE SPECIALIST

## 2022-10-10 PROCEDURE — 1159F PR MEDICATION LIST DOCUMENTED IN MEDICAL RECORD: ICD-10-PCS | Mod: CPTII,95,, | Performed by: CLINICAL NURSE SPECIALIST

## 2022-10-10 PROCEDURE — 99215 PR OFFICE/OUTPT VISIT, EST, LEVL V, 40-54 MIN: ICD-10-PCS | Mod: 95,,, | Performed by: CLINICAL NURSE SPECIALIST

## 2022-10-10 NOTE — PROGRESS NOTES
"Subjective:          Patient ID: King Perez is a 37 y.o. female who presents today for a fit-in virtual visit for MS.  She was last seen in May 2022. The history has been provided by the patient.     The patient location is: her home   The chief complaint leading to consultation is: MS    Visit type: audiovisual    Face to Face time with patient: 25 minutes  40 minutes of total time spent on the encounter, which includes face to face time and non-face to face time preparing to see the patient (eg, review of tests), Obtaining and/or reviewing separately obtained history, Documenting clinical information in the electronic or other health record, Independently interpreting results (not separately reported) and communicating results to the patient/family/caregiver, or Care coordination (not separately reported).     Each patient to whom he or she provides medical services by telemedicine is:  (1) informed of the relationship between the physician and patient and the respective role of any other health care provider with respect to management of the patient; and (2) notified that he or she may decline to receive medical services by telemedicine and may withdraw from such care at any time.      MS HPI:  DMT: ocrelizumab; infusion due in November  Side effects from DMT? No  Taking vitamin D3 as recommended? Yes   King is hesitant about continuing with her Ocrevus infusion next month. She has not felt as well since starting Ocrevus. She has felt more symptomatic in general and has had more frequent infections. She is interested in going back to Rebif. To be remembered is that she did develop a new lesion on Rebif in the past.   Early on, she had hair thinning and joint pain on Ocrevus. She feels "crap gap" in the weeks leading up to the infusion. She also notices her handwriting change in the weeks leading up to the infusion.   She has missed work 3 times since her last infusion. This is very unusual for her. "   She had a very bad experience with her last infusion. The IV catheter came out of her arm, and her arm became swollen and red. She was tearful today describing her poor experienve.   She feels like her vision has recently changed.   She still plans to try and conceive. She has been off of birth control since the second or third week of August. She has not yet ovulated, but she did have one very short menstrual cycle. She will see her gynecologist on 11/7.     Medications:  Current Outpatient Medications   Medication Sig    B complex-C-E-Zn Tab Take 1 tablet by mouth once daily.    baclofen (LIORESAL) 10 MG tablet Take 1 tablet (10 mg total) by mouth nightly as needed (muscle spasm). (Patient not taking: Reported on 5/9/2022)    drospirenone-ethinyl estradioL (MARCE) 3-0.02 mg per tablet Take 1 tablet by mouth once daily.    ocrelizumab (OCREVUS IV) Inject into the vein.    ondansetron (ZOFRAN-ODT) 4 MG TbDL Take 1 tablet (4 mg total) by mouth every 8 (eight) hours as needed (nausea).    sertraline (ZOLOFT) 25 MG tablet TAKE 1 TABLET(25 MG) BY MOUTH EVERY DAY    valACYclovir (VALTREX) 1000 MG tablet Take 1 tablet (1,000 mg total) by mouth 2 (two) times a day.    vitamin D 1000 units Tab Take 5,000 Units by mouth once daily.        SOCIAL HISTORY  Social History     Tobacco Use    Smoking status: Never    Smokeless tobacco: Never   Substance Use Topics    Alcohol use: Yes     Comment: socially    Drug use: No       Living arrangements - the patient lives with their spouse.    ROS:    REVIEW OF SYMPTOMS 5/9/2022   Do you feel abnormally tired on most days? Yes   Do you feel you generally sleep well? Yes   Do you have difficulty controlling your bladder?  No   Do you have difficulty controlling your bowels?  No   Do you have frequent muscle cramps, tightness or spasms in your limbs?  No   Do you have new visual symptoms?  No   Do you have worsening difficulty with your memory or thinking? No   Do you have worsening  symptoms of anxiety or depression?  No   For patients who walk, Do you have more difficulty walking?  No   Have you fallen since your last visit?  No   For patients who use wheelchairs: Do you have any skin wounds or breakdown? Not Applicable   Do you have difficulty using your hands?  No   Do you have shooting or burning pain? No   Do you have difficulty with sexual function?  No   If you are sexually active, are you using birth control? Y/N  N/A Yes   Do you often choke when swallowing liquids or solid food?  No   Do you experience worsening symptoms when overheated? Yes   Do you need any new equipment such as a wheelchair, walker or shower chair? No   Do you receive co-pay financial assistance for your principal MS medicine? Yes   Would you be interested in participating in an MS research trial in the future? No   For patients on Gilenya, Tecfidera, Aubagio, Rituxan, Ocrevus, Tysabri, Lemtrada or Methotrexate, are you aware that you should NOT receive live virus vaccines?  Yes   Do you feel you have adequate family/friend support?  Yes   Do you have health insurance?   Yes   Are you currently employed? Yes   Do you receive SSDI/SSI?  Not Applicable   Do you use marijuana or cannabis products? Yes   How often? Monthly   Have you been diagnosed with a urinary tract infection since your last visit here? No   Have you been diagnosed with a respiratory tract infection since your last visit here? Yes   Have you been to the emergency room since your last visit here? No   Have you been hospitalized since your last visit here?  No                Objective:        1. 25 foot timed walk:  Timed 25 Foot Walk: 11/3/2021 5/9/2022   Did patient wear an AFO? No No   Was assistive device used? No No   Time for 25 Foot Walk (seconds) 4.09 3.5   Time for 25 Foot Walk (seconds) - 3.89       Neurologic Exam    Deferred   Imaging:     Results for orders placed during the hospital encounter of 11/01/21    MRI Brain Demyelinating  Without Contrast    Impression  No significant change from prior with continued few scattered punctate foci of T2 FLAIR signal hyperintensity throughout the brain parenchyma while nonspecific remain concerning for mild degree of prior demyelinating plaque burden.    No definite new lesion to suggest significant interval or active demyelination.    Clinical correlation and follow-up advised.      Electronically signed by: Dennis Palmer DO  Date:    11/01/2021  Time:    11:19    No results found for this or any previous visit.    No results found for this or any previous visit.    Results for orders placed during the hospital encounter of 11/02/20    MRI Brain Demyelinating W W/O Contrast    Impression  Interval resolution of previous enhancement associated with left temporal lobe lesion.  Relatively stable scattered T2 FLAIR signal hyperintensities throughout the brain parenchyma suggestive for mild degree of prior demyelinating plaque.  No evidence for new lesion or current enhancing lesion to suggest interval or active demyelination.    Incidental small left parietal developmental venous anomaly unchanged    Clinical correlation and follow-up advised    Electronically signed by resident: Krishna Morris  Date:    11/02/2020  Time:    11:40    Electronically signed by: Dennis Palmer DO  Date:    11/02/2020  Time:    12:04    Labs:     Lab Results   Component Value Date    BKOZFMPB97IQ 81 10/25/2021    CDWXEVUZ94YX 59 10/21/2020    EKIODJJN10GX 58 11/20/2019     Lab Results   Component Value Date    JCVINDEX 1.52 (A) 02/06/2020    JCVANTIBODY Positive (A) 02/06/2020     Lab Results   Component Value Date    YT8XVHSN 73.8 02/06/2020    ABSOLUTECD3 1007.0 02/06/2020    EK8EYAJT 17.1 02/06/2020    ABSOLUTECD8 234.0 02/06/2020    DK1KKCCP 55.7 02/06/2020    ABSOLUTECD4 761.0 02/06/2020    LABCD48 3.25 02/06/2020     Lab Results   Component Value Date    WBC 4.00 06/23/2022    RBC 3.43 (L) 06/23/2022    HGB 11.7 (L)  06/23/2022    HCT 35.3 (L) 06/23/2022     (H) 06/23/2022    MCH 34.1 (H) 06/23/2022    MCHC 33.1 06/23/2022    RDW 11.8 06/23/2022     (L) 06/23/2022    MPV 11.1 06/23/2022    GRAN 2.2 06/23/2022    GRAN 54.4 06/23/2022    LYMPH 1.3 06/23/2022    LYMPH 33.5 06/23/2022    MONO 0.4 06/23/2022    MONO 9.0 06/23/2022    EOS 0.1 06/23/2022    BASO 0.01 06/23/2022    EOSINOPHIL 2.3 06/23/2022    BASOPHIL 0.3 06/23/2022     Sodium   Date Value Ref Range Status   06/23/2022 138 136 - 145 mmol/L Final     Potassium   Date Value Ref Range Status   06/23/2022 3.9 3.5 - 5.1 mmol/L Final     Chloride   Date Value Ref Range Status   06/23/2022 105 95 - 110 mmol/L Final     CO2   Date Value Ref Range Status   06/23/2022 24 23 - 29 mmol/L Final     Glucose   Date Value Ref Range Status   06/23/2022 107 70 - 110 mg/dL Final     BUN   Date Value Ref Range Status   06/23/2022 5 (L) 6 - 20 mg/dL Final     Creatinine   Date Value Ref Range Status   06/23/2022 0.7 0.5 - 1.4 mg/dL Final     Calcium   Date Value Ref Range Status   06/23/2022 8.7 8.7 - 10.5 mg/dL Final     Total Protein   Date Value Ref Range Status   06/23/2022 6.3 6.0 - 8.4 g/dL Final     Albumin   Date Value Ref Range Status   06/23/2022 3.4 (L) 3.5 - 5.2 g/dL Final     Total Bilirubin   Date Value Ref Range Status   06/23/2022 0.4 0.1 - 1.0 mg/dL Final     Comment:     For infants and newborns, interpretation of results should be based  on gestational age, weight and in agreement with clinical  observations.    Premature Infant recommended reference ranges:  Up to 24 hours.............<8.0 mg/dL  Up to 48 hours............<12.0 mg/dL  3-5 days..................<15.0 mg/dL  6-29 days.................<15.0 mg/dL       Alkaline Phosphatase   Date Value Ref Range Status   06/23/2022 40 (L) 55 - 135 U/L Final     AST   Date Value Ref Range Status   06/23/2022 22 10 - 40 U/L Final     ALT   Date Value Ref Range Status   06/23/2022 20 10 - 44 U/L Final     Anion  Gap   Date Value Ref Range Status   06/23/2022 9 8 - 16 mmol/L Final     eGFR if    Date Value Ref Range Status   06/23/2022 >60.0 >60 mL/min/1.73 m^2 Final     eGFR if non    Date Value Ref Range Status   06/23/2022 >60.0 >60 mL/min/1.73 m^2 Final     Comment:     Calculation used to obtain the estimated glomerular filtration  rate (eGFR) is the CKD-EPI equation.        Lab Results   Component Value Date    HEPBSAG Negative 04/18/2022    HEPBSAB Positive 04/18/2022    HEPBCAB Negative 04/18/2022           MS Impression and Plan:     NEURO MULTIPLE SCLEROSIS IMPRESSION:   MS Status:     Number of relapses in the past year?:  0  Plan:     DMT comment:  King does not want to continue with Ocrevus at this time. She has not felt as well, in general, since starting Ocrevus. She and her  are still trying to conceive. She would be due for her 6 month infusion next month, and she has MRIs scheduled in November. We will wait until MRIs are done to confirm next steps. However, as long as she remains stable, we can consider going back to Copaxone or Rebif for 6 months while she tries to conceive. She understands that she developed new lesions on both of these medications in the past, so there is a risk for new lesions/relapse. We can re-evaluate at 6 months. We also briefly discussed Kesimpta as an alterative, but if she plans to get pregnant, I think we should hold on this medication for now.      Implement Disease Modifying Therapy:  Ocrelizumab    Symptom Management:  No change in symptom management       MRIs in November as scheduled, and she will follow up with Dr. Hernandez in January as scheduled.     Problem List Items Addressed This Visit          Neurologic Problems    Multiple sclerosis - Primary     Other Visit Diagnoses       Counseling regarding goals of care        Prophylactic immunotherapy        High risk medication use                  RADHA Monzon, CNS

## 2022-10-31 ENCOUNTER — PATIENT MESSAGE (OUTPATIENT)
Dept: NEUROLOGY | Facility: CLINIC | Age: 37
End: 2022-10-31
Payer: COMMERCIAL

## 2022-10-31 DIAGNOSIS — G35 MULTIPLE SCLEROSIS: Primary | ICD-10-CM

## 2022-10-31 RX ORDER — DIAZEPAM 5 MG/1
TABLET ORAL
Qty: 2 TABLET | Refills: 0 | Status: SHIPPED | OUTPATIENT
Start: 2022-10-31 | End: 2023-04-03 | Stop reason: CLARIF

## 2022-11-07 ENCOUNTER — HOSPITAL ENCOUNTER (OUTPATIENT)
Dept: RADIOLOGY | Facility: HOSPITAL | Age: 37
Discharge: HOME | End: 2022-11-07
Attending: CLINICAL NURSE SPECIALIST
Payer: COMMERCIAL

## 2022-11-07 DIAGNOSIS — G35 MULTIPLE SCLEROSIS: ICD-10-CM

## 2022-11-07 PROCEDURE — 70551 MRI BRAIN STEM W/O DYE: CPT | Mod: TC

## 2022-11-07 PROCEDURE — 70551 MRI BRAIN DEMYELINATING WITHOUT CONTRAST: ICD-10-PCS | Mod: 26,,, | Performed by: RADIOLOGY

## 2022-11-07 PROCEDURE — 72141 MRI CERVICAL SPINE DEMYELINATING WITHOUT CONTRAST: ICD-10-PCS | Mod: 26,,, | Performed by: RADIOLOGY

## 2022-11-07 PROCEDURE — 72146 MRI CHEST SPINE W/O DYE: CPT | Mod: TC

## 2022-11-07 PROCEDURE — 72141 MRI NECK SPINE W/O DYE: CPT | Mod: TC

## 2022-11-07 PROCEDURE — 72146 MRI CHEST SPINE W/O DYE: CPT | Mod: 26,,, | Performed by: RADIOLOGY

## 2022-11-07 PROCEDURE — 70551 MRI BRAIN STEM W/O DYE: CPT | Mod: 26,,, | Performed by: RADIOLOGY

## 2022-11-07 PROCEDURE — 72146 MRI THORACIC SPINE DEMYELINATING WITHOUT CONTRAST: ICD-10-PCS | Mod: 26,,, | Performed by: RADIOLOGY

## 2022-11-07 PROCEDURE — 72141 MRI NECK SPINE W/O DYE: CPT | Mod: 26,,, | Performed by: RADIOLOGY

## 2022-11-09 ENCOUNTER — PATIENT MESSAGE (OUTPATIENT)
Dept: NEUROLOGY | Facility: CLINIC | Age: 37
End: 2022-11-09
Payer: COMMERCIAL

## 2022-11-09 DIAGNOSIS — G35 MULTIPLE SCLEROSIS: Primary | ICD-10-CM

## 2022-11-11 ENCOUNTER — TELEPHONE (OUTPATIENT)
Dept: INTERNAL MEDICINE | Facility: CLINIC | Age: 37
End: 2022-11-11
Payer: COMMERCIAL

## 2022-11-11 NOTE — TELEPHONE ENCOUNTER
Left voice mail , and sent my chart message Dr John is not in clinic on Tuesday 11/15  Appointment changed to Dr Hawkins

## 2022-11-12 NOTE — TELEPHONE ENCOUNTER
Completed Rebmanju MOORE via [employer].    CaseId:[id number];  Status:Approved;  Review Type:Prior Auth;  Coverage Start Date:11/12/2022;  Coverage End Date:11/12/2023;

## 2022-11-14 ENCOUNTER — PATIENT MESSAGE (OUTPATIENT)
Dept: NEUROLOGY | Facility: CLINIC | Age: 37
End: 2022-11-14
Payer: COMMERCIAL

## 2022-11-14 ENCOUNTER — LAB VISIT (OUTPATIENT)
Dept: LAB | Facility: HOSPITAL | Age: 37
End: 2022-11-14
Payer: COMMERCIAL

## 2022-11-14 DIAGNOSIS — D69.6 THROMBOCYTOPENIA: Primary | ICD-10-CM

## 2022-11-14 DIAGNOSIS — G35 MULTIPLE SCLEROSIS: ICD-10-CM

## 2022-11-14 DIAGNOSIS — D64.9 ANEMIA, UNSPECIFIED TYPE: ICD-10-CM

## 2022-11-14 LAB
25(OH)D3+25(OH)D2 SERPL-MCNC: 67 NG/ML (ref 30–96)
ALBUMIN SERPL BCP-MCNC: 4.1 G/DL (ref 3.5–5.2)
ALP SERPL-CCNC: 38 U/L (ref 55–135)
ALT SERPL W/O P-5'-P-CCNC: 17 U/L (ref 10–44)
ANION GAP SERPL CALC-SCNC: 7 MMOL/L (ref 8–16)
AST SERPL-CCNC: 17 U/L (ref 10–40)
BASOPHILS # BLD AUTO: 0.03 K/UL (ref 0–0.2)
BASOPHILS NFR BLD: 0.6 % (ref 0–1.9)
BILIRUB SERPL-MCNC: 0.5 MG/DL (ref 0.1–1)
BUN SERPL-MCNC: 6 MG/DL (ref 6–20)
CALCIUM SERPL-MCNC: 9.4 MG/DL (ref 8.7–10.5)
CHLORIDE SERPL-SCNC: 105 MMOL/L (ref 95–110)
CO2 SERPL-SCNC: 24 MMOL/L (ref 23–29)
CREAT SERPL-MCNC: 0.7 MG/DL (ref 0.5–1.4)
DIFFERENTIAL METHOD: ABNORMAL
EOSINOPHIL # BLD AUTO: 0.1 K/UL (ref 0–0.5)
EOSINOPHIL NFR BLD: 1 % (ref 0–8)
ERYTHROCYTE [DISTWIDTH] IN BLOOD BY AUTOMATED COUNT: 11.3 % (ref 11.5–14.5)
EST. GFR  (NO RACE VARIABLE): >60 ML/MIN/1.73 M^2
GLUCOSE SERPL-MCNC: 89 MG/DL (ref 70–110)
HCT VFR BLD AUTO: 36.6 % (ref 37–48.5)
HGB BLD-MCNC: 12.7 G/DL (ref 12–16)
IMM GRANULOCYTES # BLD AUTO: 0.02 K/UL (ref 0–0.04)
IMM GRANULOCYTES NFR BLD AUTO: 0.4 % (ref 0–0.5)
LYMPHOCYTES # BLD AUTO: 2.4 K/UL (ref 1–4.8)
LYMPHOCYTES NFR BLD: 45.3 % (ref 18–48)
MCH RBC QN AUTO: 34 PG (ref 27–31)
MCHC RBC AUTO-ENTMCNC: 34.7 G/DL (ref 32–36)
MCV RBC AUTO: 98 FL (ref 82–98)
MONOCYTES # BLD AUTO: 0.2 K/UL (ref 0.3–1)
MONOCYTES NFR BLD: 4.6 % (ref 4–15)
NEUTROPHILS # BLD AUTO: 2.5 K/UL (ref 1.8–7.7)
NEUTROPHILS NFR BLD: 48.1 % (ref 38–73)
NRBC BLD-RTO: 0 /100 WBC
PLATELET # BLD AUTO: 82 K/UL (ref 150–450)
PMV BLD AUTO: 11.3 FL (ref 9.2–12.9)
POTASSIUM SERPL-SCNC: 3.8 MMOL/L (ref 3.5–5.1)
PROT SERPL-MCNC: 7.2 G/DL (ref 6–8.4)
RBC # BLD AUTO: 3.74 M/UL (ref 4–5.4)
SODIUM SERPL-SCNC: 136 MMOL/L (ref 136–145)
WBC # BLD AUTO: 5.25 K/UL (ref 3.9–12.7)

## 2022-11-14 PROCEDURE — 85025 COMPLETE CBC W/AUTO DIFF WBC: CPT | Performed by: CLINICAL NURSE SPECIALIST

## 2022-11-14 PROCEDURE — 80053 COMPREHEN METABOLIC PANEL: CPT | Performed by: CLINICAL NURSE SPECIALIST

## 2022-11-14 PROCEDURE — 36415 COLL VENOUS BLD VENIPUNCTURE: CPT | Mod: PO | Performed by: CLINICAL NURSE SPECIALIST

## 2022-11-14 PROCEDURE — 82306 VITAMIN D 25 HYDROXY: CPT | Performed by: CLINICAL NURSE SPECIALIST

## 2022-11-14 RX ORDER — INTERFERON BETA-1A 8.8-22(6)
KIT SUBCUTANEOUS
Qty: 4.2 ML | Refills: 0 | Status: SHIPPED | OUTPATIENT
Start: 2022-11-14 | End: 2023-01-09

## 2022-11-14 RX ORDER — INTERFERON BETA-1A 44 UG/.5ML
44 INJECTION, SOLUTION SUBCUTANEOUS
Qty: 36 PEN | Refills: 0 | Status: SHIPPED | OUTPATIENT
Start: 2022-11-14 | End: 2023-04-13 | Stop reason: SDUPTHER

## 2022-11-17 ENCOUNTER — SPECIALTY PHARMACY (OUTPATIENT)
Dept: PHARMACY | Facility: CLINIC | Age: 37
End: 2022-11-17
Payer: COMMERCIAL

## 2022-11-17 NOTE — TELEPHONE ENCOUNTER
Rebif PA on file. Case ID: [id number]. Approved from 11/12/2022 - 11/12/2023.    Test claim: $0    Benefit Investigation  Esphion per PRC  Deductible: $2900 (met)  Max OOP: $8700 (met)  Estimated copay: $0  [facility] in network    FA not required. Forward to initial.

## 2022-11-18 ENCOUNTER — SPECIALTY PHARMACY (OUTPATIENT)
Dept: PHARMACY | Facility: CLINIC | Age: 37
End: 2022-11-18
Payer: COMMERCIAL

## 2022-11-18 DIAGNOSIS — G35 MULTIPLE SCLEROSIS: Primary | ICD-10-CM

## 2022-11-18 NOTE — TELEPHONE ENCOUNTER
Outgoing call to patient for Rebif initial, [facility] is OOS of the 8.8mcg/22mcg pen. Checking with distributor if we are able to order the medication. Will follow up with patient.

## 2022-11-18 NOTE — TELEPHONE ENCOUNTER
[facility] is able to obtain the Rebif 8.8mcg/22mcg from the distributor by Tuesday. Attempted to call patient to complete initial, LVM. During previous call, reviewed Rebif indication, dosing, storage, goals of therapy, and side effects. Recommended patient to monitor for any increased bleeding/bruising due to low platelet level, patient verbalized understanding and is seeing a hematologist on Wednesday.

## 2022-11-18 NOTE — TELEPHONE ENCOUNTER
Specialty Pharmacy - Initial Clinical Assessment    Specialty Medication Orders Linked to Encounter      Flowsheet Row Most Recent Value   Medication #1 interferon beta-1a, albumin, (REBIF REBIDOSE) 8.8mcg/0.2mL-22 mcg/0.5mL (6) PnIj (Order#[id number], Rx#[id number])   Medication #2 interferon beta-1a, albumin, (REBIF REBIDOSE) 44 mcg/0.5 mL PnIj (Order#[id number], Rx#[id number])          Patient Diagnosis   G35 - Multiple sclerosis    Subjective    King Perez is a 37 y.o. female, who is followed by the specialty pharmacy service for management and education.    Recent Encounters       Date Type Provider Description    11/18/2022 Specialty Pharmacy Kristy Abrams PharmD Initial Clinical Assessment    11/17/2022 Specialty Pharmacy Kristy Abrams PharmD Referral Authorization          Clinical call attempts since last clinical assessment   11/18/2022  7:45 PM - Specialty Pharmacy - Clinical Assessment by Kristy Abrams PharmD     Current Outpatient Medications   Medication Sig    B complex-C-E-Zn Tab Take 1 tablet by mouth once daily.    drospirenone-ethinyl estradioL (MARCE) 3-0.02 mg per tablet Take 1 tablet by mouth once daily.    PNV no.95/ferrous fum/folic ac (PRENATAL MULTIVITAMINS ORAL) Take by mouth.    semaglutide, weight loss, 0.25 mg/0.5 mL PnIj Inject 0.25 mg into the skin every 7 days.    vitamin D 1000 units Tab Take 5,000 Units by mouth once daily.     baclofen (LIORESAL) 10 MG tablet Take 1 tablet (10 mg total) by mouth nightly as needed (muscle spasm). (Patient not taking: Reported on 5/9/2022)    diazePAM (VALIUM) 5 MG tablet Take 1 tablet by mouth 1 hour prior to MRI and1 tablet at the time of the MRI if needed.    interferon beta-1a, albumin, (REBIF REBIDOSE) 44 mcg/0.5 mL PnIj Inject 0.5 mLs (44 mcg total) into the skin 3 (three) times a week.    interferon beta-1a, albumin, (REBIF REBIDOSE) 8.8mcg/0.2mL-22 mcg/0.5mL (6) PnIj Inject 8.8mcg SQ three times a week for 2 weeks, then 22mcg  SQ three times a week for 2 weeks. - Subcutaneous    ondansetron (ZOFRAN-ODT) 4 MG TbDL Take 1 tablet (4 mg total) by mouth every 8 (eight) hours as needed (nausea).    sertraline (ZOLOFT) 25 MG tablet TAKE 1 TABLET(25 MG) BY MOUTH EVERY DAY    valACYclovir (VALTREX) 1000 MG tablet Take 1 tablet (1,000 mg total) by mouth 2 (two) times a day.   Last reviewed on 11/18/2022  4:30 PM by Kristy Abrams, PharmD    Review of patient's allergies indicates:  No Known AllergiesLast reviewed on  11/18/2022 4:28 PM by Kristy Abrams    Drug Interactions    Drug interactions evaluated: yes  Clinically relevant drug interactions identified: no  Provided the patient with educational material regarding drug interactions: not applicable         Adverse Effects    *All other systems reviewed and are negative       Assessment Questions - Documented Responses      Flowsheet Row Most Recent Value   Assessment    Medication Reconciliation completed for patient Yes   During the past 4 weeks, has patient missed any activities due to condition or medication? No   During the past 4 weeks, did patient have any of the following urgent care visits? None   Goals of Therapy Status Discussed (new start)   Status of the patients ability to self-administer: Is Able   All education points have been covered with patient? No, patient declined- printed education provided  [Patient has been on Rebif in the past]   Welcome packet contents reviewed and discussed with patient? Yes   Assesment completed? Yes   Plan Therapy being initiated   Do you need to open a clinical intervention (i-vent)? No   Do you want to schedule first shipment? Yes   Medication #1 Assessment Info    Patient status New medication, New to [facility]   Is this medication appropriate for the patient? Yes   Is this medication effective? Not yet started   Medication #2 Assessment Info    Patient status New medication, New to [facility]   Is this medication appropriate for the patient? Yes   Is this  "medication effective? Not yet started          Refill Questions - Documented Responses      Flowsheet Row Most Recent Value   Patient Availability and HIPAA Verification    Does patient want to proceed with activity? Yes   HIPAA/medical authority confirmed? Yes   Relationship to patient of person spoken to? Self   Refill Screening Questions    When does the patient need to receive the medication? 11/25/22   Refill Delivery Questions    How will the patient receive the medication? [facility]   When does the patient need to receive the medication? 11/25/22   Shipping Address Home   Address in [facility] confirmed and updated if neccessary? Yes   Expected Copay ($) 0   Is the patient able to afford the medication copay? Yes   Payment Method zero copay   Days supply of Refill 28   Supplies needed? No supplies needed   Refill activity completed? Yes   Refill activity plan Refill scheduled   Shipment/Pickup Date: 11/23/22            Objective    She has a past medical history of Multiple sclerosis.    Tried/failed medications: Nikolay Tucker    BP Readings from Last 4 Encounters:   05/16/22 110/64   05/09/22 104/68   03/02/22 122/68   11/15/21 (!) 110/58     Ht Readings from Last 4 Encounters:   05/09/22 5' 5" (1.651 m)   03/02/22 5' 5" (1.651 m)   11/15/21 5' 5" (1.651 m)   11/03/21 5' 5" (1.651 m)     Wt Readings from Last 4 Encounters:   05/09/22 67 kg (147 lb 9.6 oz)   03/02/22 65.5 kg (144 lb 6.4 oz)   11/15/21 69.4 kg (153 lb)   11/03/21 67.8 kg (149 lb 9.3 oz)       The goals of Multiple Sclerosis treatment include:  Reducing frequency and severity of exacerbations  Managing symptoms to improve or maintain physical and psychological functioning and optimal well-being  Preventing or postponing long-term disability to maintain independence  Improving or maintaining quality of life  Maintaining optimal therapy adherence  Minimizing and managing comorbidities and side effects    Goals of Therapy Status: Discussed " (new start)    Assessment/Plan  Patient plans to start therapy on 11/25/22      Indication, dosage, appropriateness, effectiveness, safety and convenience of her specialty medication(s) were reviewed today.     Patient Education   Pharmacist offer to  patient was declined. Printed educational materials will be provided with medication.  Patient did accept verbal education on the following topics:  · Expectations and possible outcomes of therapy  · Side effects, including prevention, minimization, and management  · Contraindications and safety precautions  · New or changed medications, including prescribe and over the counter medications and supplements  · Storage, safe handling, and disposal      Tasks added this encounter   No tasks added.   Tasks due within next 3 months   11/18/2022 - Clinical - Initial Assessment     Kristy Abrams, PharmD  [facility]  [address] [facility] [address]  Phone: [phone]  Fax: [phone]

## 2022-11-23 ENCOUNTER — OFFICE VISIT (OUTPATIENT)
Dept: HEMATOLOGY/ONCOLOGY | Facility: CLINIC | Age: 37
End: 2022-11-23
Payer: COMMERCIAL

## 2022-11-23 DIAGNOSIS — D64.9 ANEMIA, UNSPECIFIED TYPE: ICD-10-CM

## 2022-11-23 DIAGNOSIS — D69.6 THROMBOCYTOPENIA: ICD-10-CM

## 2022-11-23 PROCEDURE — 99204 OFFICE O/P NEW MOD 45 MIN: CPT | Mod: 95,,, | Performed by: INTERNAL MEDICINE

## 2022-11-23 PROCEDURE — 99204 PR OFFICE/OUTPT VISIT, NEW, LEVL IV, 45-59 MIN: ICD-10-PCS | Mod: 95,,, | Performed by: INTERNAL MEDICINE

## 2022-11-23 NOTE — Clinical Note
-please schedule pt for lab draw at [facility] on 11/28 late morning; please link draw all labs I entered today  -virtual MD appt during benign clinic on 12/7 to review results

## 2022-11-23 NOTE — PROGRESS NOTES
The patient location is: home  The chief complaint leading to consultation is: thrombocytopenia     Visit type: audiovisual    Face to Face time with patient: 10   25  minutes of total time spent on the encounter, which includes face to face time and non-face to face time preparing to see the patient (eg, review of tests), Obtaining and/or reviewing separately obtained history, Documenting clinical information in the electronic or other health record, Independently interpreting results (not separately reported) and communicating results to the patient/family/caregiver, or Care coordination (not separately reported).         Each patient to whom he or she provides medical services by telemedicine is:  (1) informed of the relationship between the physician and patient and the respective role of any other health care provider with respect to management of the patient; and (2) notified that he or she may decline to receive medical services by telemedicine and may withdraw from such care at any time.    Notes:       SECTION OF HEMATOLOGY AND BONE MARROW TRANSPLANT  New Patient Visit   11/23/2022  Referred by:  Margie Fierro  Referred for: thrombocytopenia    CHIEF COMPLAINT: No chief complaint on file.      HISTORY OF PRESENT ILLNESS:   37 y.o. female with pmh of MS currently on ocrevus but considering change in therapy due to side effects.  Referred by neurology for thrombocytopenia. Upon review of labs mild intermittent thrombocytopenia in past with resolution.  Same for grade 1 anemia. Denies fever, chills, nightsweats, bleeding, brusing, lymphadenopathy, signs/symptoms of splenomegaly.           PAST MEDICAL HISTORY:   Past Medical History:   Diagnosis Date    Multiple sclerosis        PAST SURGICAL HISTORY:   Past Surgical History:   Procedure Laterality Date    CERVICAL BIOPSY  W/ LOOP ELECTRODE EXCISION      DILATION AND CURETTAGE OF UTERUS  2014       PAST SOCIAL HISTORY:   reports that she has never smoked.  She has never used smokeless tobacco. She reports current alcohol use. She reports that she does not use drugs.    FAMILY HISTORY:  Family History   Problem Relation Age of Onset    Hypertension Mother     No Known Problems Father     Cancer Maternal Grandmother         brain tumor - later in life    Cancer Paternal Grandmother 60        breast CA       CURRENT MEDICATIONS:   Current Outpatient Medications   Medication Sig    B complex-C-E-Zn Tab Take 1 tablet by mouth once daily.    baclofen (LIORESAL) 10 MG tablet Take 1 tablet (10 mg total) by mouth nightly as needed (muscle spasm). (Patient not taking: Reported on 5/9/2022)    diazePAM (VALIUM) 5 MG tablet Take 1 tablet by mouth 1 hour prior to MRI and1 tablet at the time of the MRI if needed.    drospirenone-ethinyl estradioL (MARCE) 3-0.02 mg per tablet Take 1 tablet by mouth once daily.    interferon beta-1a, albumin, (REBIF REBIDOSE) 44 mcg/0.5 mL PnIj Inject 0.5 mLs (44 mcg total) into the skin 3 (three) times a week.    interferon beta-1a, albumin, (REBIF REBIDOSE) 8.8mcg/0.2mL-22 mcg/0.5mL (6) PnIj Inject 8.8mcg SQ three times a week for 2 weeks, then 22mcg SQ three times a week for 2 weeks. - Subcutaneous    ondansetron (ZOFRAN-ODT) 4 MG TbDL Take 1 tablet (4 mg total) by mouth every 8 (eight) hours as needed (nausea).    PNV no.95/ferrous fum/folic ac (PRENATAL MULTIVITAMINS ORAL) Take by mouth.    semaglutide, weight loss, 0.25 mg/0.5 mL PnIj Inject 0.25 mg into the skin every 7 days.    sertraline (ZOLOFT) 25 MG tablet TAKE 1 TABLET(25 MG) BY MOUTH EVERY DAY    valACYclovir (VALTREX) 1000 MG tablet Take 1 tablet (1,000 mg total) by mouth 2 (two) times a day.    vitamin D 1000 units Tab Take 5,000 Units by mouth once daily.      No current facility-administered medications for this visit.     ALLERGIES:   Review of patient's allergies indicates:  No Known Allergies          REVIEW OF SYSTEMS:   Answers submitted by the patient for this visit:  Review of  Systems Questionnaire (Submitted on 11/22/2022)  appetite change : No  unexpected weight change: No  mouth sores: No  visual disturbance: No  cough: No  shortness of breath: No  chest pain: No  abdominal pain: No  diarrhea: No  frequency: No  back pain: No  rash: No  headaches: No  adenopathy: No  nervous/ anxious: Yes      PHYSICAL EXAM:   physical exam deferred due to telemed       ECOG Performance Status: (foot note - ECOG PS provided by Eastern Cooperative Oncology Group) 0 - Asymptomatic    Karnofsky Performance Score:  90%- Able to Carry on Normal Activity: Minor Symptoms of Disease  DATA:   Lab Results   Component Value Date    WBC 5.25 11/14/2022    HGB 12.7 11/14/2022    HCT 36.6 (L) 11/14/2022    MCV 98 11/14/2022    PLT 82 (L) 11/14/2022       CMP  Sodium   Date Value Ref Range Status   11/14/2022 136 136 - 145 mmol/L Final     Potassium   Date Value Ref Range Status   11/14/2022 3.8 3.5 - 5.1 mmol/L Final     Chloride   Date Value Ref Range Status   11/14/2022 105 95 - 110 mmol/L Final     CO2   Date Value Ref Range Status   11/14/2022 24 23 - 29 mmol/L Final     Glucose   Date Value Ref Range Status   11/14/2022 89 70 - 110 mg/dL Final     BUN   Date Value Ref Range Status   11/14/2022 6 6 - 20 mg/dL Final     Creatinine   Date Value Ref Range Status   11/14/2022 0.7 0.5 - 1.4 mg/dL Final     Calcium   Date Value Ref Range Status   11/14/2022 9.4 8.7 - 10.5 mg/dL Final     Total Protein   Date Value Ref Range Status   11/14/2022 7.2 6.0 - 8.4 g/dL Final     Albumin   Date Value Ref Range Status   11/14/2022 4.1 3.5 - 5.2 g/dL Final     Total Bilirubin   Date Value Ref Range Status   11/14/2022 0.5 0.1 - 1.0 mg/dL Final     Comment:     For infants and newborns, interpretation of results should be based  on gestational age, weight and in agreement with clinical  observations.    Premature Infant recommended reference ranges:  Up to 24 hours.............<8.0 mg/dL  Up to 48 hours............<12.0  mg/dL  3-5 days..................<15.0 mg/dL  6-29 days.................<15.0 mg/dL       Alkaline Phosphatase   Date Value Ref Range Status   11/14/2022 38 (L) 55 - 135 U/L Final     AST   Date Value Ref Range Status   11/14/2022 17 10 - 40 U/L Final     ALT   Date Value Ref Range Status   11/14/2022 17 10 - 44 U/L Final     Anion Gap   Date Value Ref Range Status   11/14/2022 7 (L) 8 - 16 mmol/L Final     eGFR   Date Value Ref Range Status   11/14/2022 >60.0 >60 mL/min/1.73 m^2 Final         ASSESSMENT AND PLAN:   Encounter Diagnoses   Name Primary?    Thrombocytopenia     Anemia, unspecified type      Multiple episodes of Transient mild asymptomatic intermittent grade 1-2 thrombocytopenia going back to at least 2018  Negligible intermittent grade 1 anemia; resolved on most recent hemogram  Prior Iron studies, b12, folate, hiv, hep b/c testing done in past in midst of these changes was unremarkable  I suspect thrombocytopenia is either lab error from clumping vs immune thrombocytopenia in pt with autoimmune disease (MS)  Recommend repeat cbc, peripheral smear examination by hematopathologist, citrated tube plt count; complete nutritional bar with copper level; complete infectious bar with h pylori IgG screen   I will see pt back one week following lab testing virtually to review results and see if any further testing/therapy  is necessary   Reassured pt I did not feel this represented any dangerous process      Follow Up: -please schedule pt for lab draw at [facility] on 11/28 late morning; please link draw all labs I entered today   -virtual MD appt during benign clinic on 12/7 to review results     Burke Tatum MD  Hematology/Oncology/Bone Marrow Transplant    Addendum : all testing including plt count normal on repeat.  No further testing or workup needed. Unclear etiology of mil intermittent thrombocytopenia. Recommend she seek medical attention if excessive bleeding or brusing occurs.    Burke  MD Deepti  Hematology & Stem Cell Transplant

## 2022-12-01 ENCOUNTER — PATIENT MESSAGE (OUTPATIENT)
Dept: PSYCHIATRY | Facility: CLINIC | Age: 37
End: 2022-12-01
Payer: COMMERCIAL

## 2022-12-02 ENCOUNTER — LAB VISIT (OUTPATIENT)
Dept: LAB | Facility: HOSPITAL | Age: 37
End: 2022-12-02
Attending: INTERNAL MEDICINE
Payer: COMMERCIAL

## 2022-12-02 DIAGNOSIS — D64.9 ANEMIA, UNSPECIFIED TYPE: ICD-10-CM

## 2022-12-02 DIAGNOSIS — D69.6 THROMBOCYTOPENIA: ICD-10-CM

## 2022-12-02 LAB
BASOPHILS # BLD AUTO: 0.02 K/UL (ref 0–0.2)
BASOPHILS NFR BLD: 0.4 % (ref 0–1.9)
DIFFERENTIAL METHOD: ABNORMAL
EOSINOPHIL # BLD AUTO: 0.1 K/UL (ref 0–0.5)
EOSINOPHIL NFR BLD: 1.2 % (ref 0–8)
ERYTHROCYTE [DISTWIDTH] IN BLOOD BY AUTOMATED COUNT: 11.7 % (ref 11.5–14.5)
HCT VFR BLD AUTO: 36.3 % (ref 37–48.5)
HGB BLD-MCNC: 12.6 G/DL (ref 12–16)
IMM GRANULOCYTES # BLD AUTO: 0 K/UL (ref 0–0.04)
IMM GRANULOCYTES NFR BLD AUTO: 0 % (ref 0–0.5)
LYMPHOCYTES # BLD AUTO: 2.4 K/UL (ref 1–4.8)
LYMPHOCYTES NFR BLD: 46.4 % (ref 18–48)
MCH RBC QN AUTO: 33.8 PG (ref 27–31)
MCHC RBC AUTO-ENTMCNC: 34.7 G/DL (ref 32–36)
MCV RBC AUTO: 97 FL (ref 82–98)
MONOCYTES # BLD AUTO: 0.3 K/UL (ref 0.3–1)
MONOCYTES NFR BLD: 5.2 % (ref 4–15)
MPV, BLUE TOP: 9.8 FL (ref 9.2–12.9)
NEUTROPHILS # BLD AUTO: 2.4 K/UL (ref 1.8–7.7)
NEUTROPHILS NFR BLD: 46.8 % (ref 38–73)
NRBC BLD-RTO: 0 /100 WBC
PATH REV BLD -IMP: NORMAL
PLATELET # BLD AUTO: 243 K/UL (ref 150–450)
PLATELET, BLUE TOP: 223 K/UL (ref 150–450)
PMV BLD AUTO: 10.4 FL (ref 9.2–12.9)
RBC # BLD AUTO: 3.73 M/UL (ref 4–5.4)
WBC # BLD AUTO: 5.21 K/UL (ref 3.9–12.7)

## 2022-12-02 PROCEDURE — 85025 COMPLETE CBC W/AUTO DIFF WBC: CPT | Performed by: INTERNAL MEDICINE

## 2022-12-02 PROCEDURE — 85060 PATHOLOGIST REVIEW: ICD-10-PCS | Mod: ,,, | Performed by: PATHOLOGY

## 2022-12-02 PROCEDURE — 82525 ASSAY OF COPPER: CPT | Performed by: INTERNAL MEDICINE

## 2022-12-02 PROCEDURE — 86677 HELICOBACTER PYLORI ANTIBODY: CPT | Performed by: INTERNAL MEDICINE

## 2022-12-02 PROCEDURE — 85060 BLOOD SMEAR INTERPRETATION: CPT | Mod: ,,, | Performed by: PATHOLOGY

## 2022-12-02 PROCEDURE — 85049 AUTOMATED PLATELET COUNT: CPT | Mod: 59 | Performed by: INTERNAL MEDICINE

## 2022-12-02 PROCEDURE — 36415 COLL VENOUS BLD VENIPUNCTURE: CPT | Mod: PO | Performed by: INTERNAL MEDICINE

## 2022-12-05 LAB
COPPER SERPL-MCNC: 1954 UG/L (ref 810–1990)
PATH REV BLD -IMP: NORMAL

## 2022-12-06 LAB — H PYLORI IGG SERPL QL IA: NORMAL

## 2022-12-16 RX ORDER — INTERFERON BETA-1A 8.8-22(6)
KIT SUBCUTANEOUS
Qty: 4.2 ML | Refills: 0 | Status: CANCELLED | OUTPATIENT
Start: 2022-12-16

## 2022-12-19 ENCOUNTER — PATIENT MESSAGE (OUTPATIENT)
Dept: PHARMACY | Facility: CLINIC | Age: 37
End: 2022-12-19
Payer: COMMERCIAL

## 2022-12-27 ENCOUNTER — SPECIALTY PHARMACY (OUTPATIENT)
Dept: PHARMACY | Facility: CLINIC | Age: 37
End: 2022-12-27
Payer: COMMERCIAL

## 2022-12-27 NOTE — TELEPHONE ENCOUNTER
Outgoing call to patient for Rebif refill. Patient reported she was contacted by [facility] and has received the Rebif 44mcg dose from their pharmacy. She stated she will call [facility] today to notify that she would like to fill with [facility] for all future fills. Patient reported she received a 28 day supply of the Rebif 44mcg dosage and started on 12/26. Will pend refill call accordingly.

## 2022-12-31 ENCOUNTER — PATIENT MESSAGE (OUTPATIENT)
Dept: NEUROLOGY | Facility: CLINIC | Age: 37
End: 2022-12-31
Payer: COMMERCIAL

## 2023-01-09 ENCOUNTER — OFFICE VISIT (OUTPATIENT)
Dept: NEUROLOGY | Facility: CLINIC | Age: 38
End: 2023-01-09
Payer: COMMERCIAL

## 2023-01-09 ENCOUNTER — TELEPHONE (OUTPATIENT)
Dept: PSYCHIATRY | Facility: CLINIC | Age: 38
End: 2023-01-09
Payer: COMMERCIAL

## 2023-01-09 VITALS
DIASTOLIC BLOOD PRESSURE: 81 MMHG | HEART RATE: 77 BPM | SYSTOLIC BLOOD PRESSURE: 119 MMHG | WEIGHT: 134.06 LBS | BODY MASS INDEX: 22.34 KG/M2 | HEIGHT: 65 IN

## 2023-01-09 DIAGNOSIS — Z29.89 PROPHYLACTIC IMMUNOTHERAPY: ICD-10-CM

## 2023-01-09 DIAGNOSIS — G35 MULTIPLE SCLEROSIS: Primary | ICD-10-CM

## 2023-01-09 DIAGNOSIS — Z71.89 COUNSELING REGARDING GOALS OF CARE: ICD-10-CM

## 2023-01-09 PROCEDURE — 3008F BODY MASS INDEX DOCD: CPT | Mod: CPTII,S$GLB,, | Performed by: PSYCHIATRY & NEUROLOGY

## 2023-01-09 PROCEDURE — 1160F RVW MEDS BY RX/DR IN RCRD: CPT | Mod: CPTII,S$GLB,, | Performed by: PSYCHIATRY & NEUROLOGY

## 2023-01-09 PROCEDURE — 99999 PR PBB SHADOW E&M-EST. PATIENT-LVL IV: ICD-10-PCS | Mod: PBBFAC,,, | Performed by: PSYCHIATRY & NEUROLOGY

## 2023-01-09 PROCEDURE — 3074F SYST BP LT 130 MM HG: CPT | Mod: CPTII,S$GLB,, | Performed by: PSYCHIATRY & NEUROLOGY

## 2023-01-09 PROCEDURE — 3008F PR BODY MASS INDEX (BMI) DOCUMENTED: ICD-10-PCS | Mod: CPTII,S$GLB,, | Performed by: PSYCHIATRY & NEUROLOGY

## 2023-01-09 PROCEDURE — 1159F PR MEDICATION LIST DOCUMENTED IN MEDICAL RECORD: ICD-10-PCS | Mod: CPTII,S$GLB,, | Performed by: PSYCHIATRY & NEUROLOGY

## 2023-01-09 PROCEDURE — 99999 PR PBB SHADOW E&M-EST. PATIENT-LVL IV: CPT | Mod: PBBFAC,,, | Performed by: PSYCHIATRY & NEUROLOGY

## 2023-01-09 PROCEDURE — 1160F PR REVIEW ALL MEDS BY PRESCRIBER/CLIN PHARMACIST DOCUMENTED: ICD-10-PCS | Mod: CPTII,S$GLB,, | Performed by: PSYCHIATRY & NEUROLOGY

## 2023-01-09 PROCEDURE — 3079F PR MOST RECENT DIASTOLIC BLOOD PRESSURE 80-89 MM HG: ICD-10-PCS | Mod: CPTII,S$GLB,, | Performed by: PSYCHIATRY & NEUROLOGY

## 2023-01-09 PROCEDURE — 3074F PR MOST RECENT SYSTOLIC BLOOD PRESSURE < 130 MM HG: ICD-10-PCS | Mod: CPTII,S$GLB,, | Performed by: PSYCHIATRY & NEUROLOGY

## 2023-01-09 PROCEDURE — 99215 OFFICE O/P EST HI 40 MIN: CPT | Mod: S$GLB,,, | Performed by: PSYCHIATRY & NEUROLOGY

## 2023-01-09 PROCEDURE — 3079F DIAST BP 80-89 MM HG: CPT | Mod: CPTII,S$GLB,, | Performed by: PSYCHIATRY & NEUROLOGY

## 2023-01-09 PROCEDURE — 1159F MED LIST DOCD IN RCRD: CPT | Mod: CPTII,S$GLB,, | Performed by: PSYCHIATRY & NEUROLOGY

## 2023-01-09 PROCEDURE — 99215 PR OFFICE/OUTPT VISIT, EST, LEVL V, 40-54 MIN: ICD-10-PCS | Mod: S$GLB,,, | Performed by: PSYCHIATRY & NEUROLOGY

## 2023-01-09 RX ORDER — INTERFERON BETA-1A 8.8-22(6)
KIT SUBCUTANEOUS
COMMUNITY
Start: 2022-11-16 | End: 2023-05-31

## 2023-01-09 NOTE — TELEPHONE ENCOUNTER
Request for letter excusing cancelling registration for [employer]. Adapting well to Rebif but wants to avoid over doing it as she adjusts to morning after shot symptoms.

## 2023-01-09 NOTE — PROGRESS NOTES
Subjective:          Patient ID: King Perez is a 37 y.o. female who presents today for a routine clinic visit for MS.      MS HPI:  DMT: interferon beta-1a SQ-- started the week after Thanksging   Side effects from DMT? Yes - some flu like sx at first, now better; some lipoatrophy in hips; open to skipping hips; She does not feel it's affected her mood.  She does feel the Rebif is leading to some insomnia.     Taking vitamin D3 as recommended? 5,000 IU / Day   She denies any new neurologic symptoms;  feeling stable; denies any sense of permanent disability   She is training for a 1/2 marathon.   Taking MgGlycinate for sleep --doing for about a week; has not helped yet.     Medications:  Current Outpatient Medications   Medication Sig    B complex-C-E-Zn Tab Take 1 tablet by mouth once daily.    diazePAM (VALIUM) 5 MG tablet Take 1 tablet by mouth 1 hour prior to MRI and1 tablet at the time of the MRI if needed.    drospirenone-ethinyl estradioL (MARCE) 3-0.02 mg per tablet Take 1 tablet by mouth once daily.    interferon beta-1a, albumin, (REBIF REBIDOSE) 44 mcg/0.5 mL PnIj Inject 0.5 mLs (44 mcg total) into the skin 3 (three) times a week.    ondansetron (ZOFRAN-ODT) 4 MG TbDL Take 1 tablet (4 mg total) by mouth every 8 (eight) hours as needed (nausea).    PAPAYA ENZYME ORAL     PNV no.95/ferrous fum/folic ac (PRENATAL MULTIVITAMINS ORAL) Take by mouth.    REBIF TITRATION PACK 8.8mcg/0.2mL-22 mcg/0.5mL (6) Syrg Inject into the skin.    semaglutide, weight loss, 0.25 mg/0.5 mL PnIj Inject 0.25 mg into the skin every 7 days.    vitamin D 1000 units Tab Take 5,000 Units by mouth once daily.      No current facility-administered medications for this visit.       SOCIAL HISTORY  Social History     Tobacco Use    Smoking status: Never    Smokeless tobacco: Never   Substance Use Topics    Alcohol use: Yes     Comment: socially    Drug use: No       Living arrangements - the patient lives with their spouse.  Works  full time     REVIEW OF SYMPTOMS 5/9/2022   Do you feel abnormally tired on most days? Yes   Do you feel you generally sleep well? Yes   Do you have difficulty controlling your bladder?  No   Do you have difficulty controlling your bowels?  No   Do you have frequent muscle cramps, tightness or spasms in your limbs?  No   Do you have new visual symptoms?  No   Do you have worsening difficulty with your memory or thinking? No   Do you have worsening symptoms of anxiety or depression?  No   For patients who walk, Do you have more difficulty walking?  No   Have you fallen since your last visit?  No   For patients who use wheelchairs: Do you have any skin wounds or breakdown? Not Applicable   Do you have difficulty using your hands?  No   Do you have shooting or burning pain? No   Do you have difficulty with sexual function?  No   If you are sexually active, are you using birth control? Y/N  N/A Yes   Do you often choke when swallowing liquids or solid food?  No   Do you experience worsening symptoms when overheated? Yes   Do you need any new equipment such as a wheelchair, walker or shower chair? No   Do you receive co-pay financial assistance for your principal MS medicine? Yes   Would you be interested in participating in an MS research trial in the future? No   For patients on Gilenya, Tecfidera, Aubagio, Rituxan, Ocrevus, Tysabri, Lemtrada or Methotrexate, are you aware that you should NOT receive live virus vaccines?  Yes   Do you feel you have adequate family/friend support?  Yes   Do you have health insurance?   Yes   Are you currently employed? Yes   Do you receive SSDI/SSI?  Not Applicable   Do you use marijuana or cannabis products? Yes   How often? Monthly   Have you been diagnosed with a urinary tract infection since your last visit here? No   Have you been diagnosed with a respiratory tract infection since your last visit here? Yes   Have you been to the emergency room since your last visit here? No   Have  you been hospitalized since your last visit here?  No                Objective:        Timed 25 Foot Walk: 5/9/2022 1/9/2023   Did patient wear an AFO? No No   Was assistive device used? No No   Time for 25 Foot Walk (seconds) 3.5 3.5   Time for 25 Foot Walk (seconds) 3.89 3.4         Neurologic Exam    MENTAL STATUS: grossly intact  CRANIAL NERVE EXAM: There is no internuclear ophthalmoplegia. Extraocular   muscles are intact.  No facial   asymmetry.There is no dysarthria.   MOTOR EXAM: Normal bulk and tone throughout UE and LE bilaterally. Rapid sequential movements are normal. Strength is 5/5 in all groups   in the lower extremities and upper extremities.   REFLEXES: Symmetric and 2+ throughout in all 4 extremities.   SENSORY EXAM: Normal to vibration t/o  COORDINATION: Normal finger-to-nose exam.   GAIT: Narrow based and stable.    Imaging:     Results for orders placed during the hospital encounter of 11/07/22    MRI Brain Demyelinating Without Contrast    Impression  Stable white matter lesions intracranially with no new lesion identified.      Electronically signed by: Vipul Angeles  Date:    11/07/2022  Time:    14:50    Results for orders placed during the hospital encounter of 11/07/22    MRI Cervical Spine Demyelinating Without Contrast    Impression  Stable appearance of the lesions within the cervical cord with no new lesion.      Electronically signed by: Vipul Angeles  Date:    11/07/2022  Time:    15:46    Results for orders placed during the hospital encounter of 11/07/22    MRI Thoracic Spine Demyelinating Without Contrast    Impression  Stable lesions within the thoracic spinal cord with no new lesion identified.      Electronically signed by: Vipul Angeles  Date:    11/07/2022  Time:    15:51    Results for orders placed during the hospital encounter of 11/02/20    MRI Brain Demyelinating W W/O Contrast    Impression  Interval resolution of previous enhancement associated with left temporal  lobe lesion.  Relatively stable scattered T2 FLAIR signal hyperintensities throughout the brain parenchyma suggestive for mild degree of prior demyelinating plaque.  No evidence for new lesion or current enhancing lesion to suggest interval or active demyelination.    Incidental small left parietal developmental venous anomaly unchanged    Clinical correlation and follow-up advised    Electronically signed by resident: Krishna Morris  Date:    11/02/2020  Time:    11:40    Electronically signed by: Dennis Palmer DO  Date:    11/02/2020  Time:    12:04    Results for orders placed during the hospital encounter of 11/26/18    MRI Cervical Spine Demyelinating W W/O Contrast    Impression  Continued multiple short segment foci of T2 stir signal hyperintensities within the cervical and thoracic cord in light of history concerning for prior areas of demyelination.    No evidence for definite new cord lesion or abnormal intrathecal enhancement to suggest significant interval or active demyelination.    Please see above for additional details      Electronically signed by: Dennis Palmer DO  Date:    11/26/2018  Time:    16:23    Results for orders placed during the hospital encounter of 11/26/18    MRI Thoracic Spine Demyelinating W W/O Contrast    Impression  Continued multiple short segment foci of T2 stir signal hyperintensities within the cervical and thoracic cord in light of history concerning for prior areas of demyelination.    No evidence for definite new cord lesion or abnormal intrathecal enhancement to suggest significant interval or active demyelination.    Please see above for additional details      Electronically signed by: Dennis Palmer DO  Date:    11/26/2018  Time:    16:23        Labs:     Lab Results   Component Value Date    MYIYMFPQ62NG 67 11/14/2022    KXTBHIDX96HZ 81 10/25/2021    QJCBHQIO55KF 59 10/21/2020     Lab Results   Component Value Date    JCVINDEX 1.52 (A) 02/06/2020    JCVANTIBODY Positive  (A) 02/06/2020     Lab Results   Component Value Date    HK9KDEMH 73.8 02/06/2020    ABSOLUTECD3 1007.0 02/06/2020    XV2TNHHK 17.1 02/06/2020    ABSOLUTECD8 234.0 02/06/2020    FY0EKHLZ 55.7 02/06/2020    ABSOLUTECD4 761.0 02/06/2020    LABCD48 3.25 02/06/2020     Lab Results   Component Value Date    WBC 5.21 12/02/2022    RBC 3.73 (L) 12/02/2022    HGB 12.6 12/02/2022    HCT 36.3 (L) 12/02/2022    MCV 97 12/02/2022    MCH 33.8 (H) 12/02/2022    MCHC 34.7 12/02/2022    RDW 11.7 12/02/2022     12/02/2022    MPV 10.4 12/02/2022    GRAN 2.4 12/02/2022    GRAN 46.8 12/02/2022    LYMPH 2.4 12/02/2022    LYMPH 46.4 12/02/2022    MONO 0.3 12/02/2022    MONO 5.2 12/02/2022    EOS 0.1 12/02/2022    BASO 0.02 12/02/2022    EOSINOPHIL 1.2 12/02/2022    BASOPHIL 0.4 12/02/2022     Sodium   Date Value Ref Range Status   11/14/2022 136 136 - 145 mmol/L Final     Potassium   Date Value Ref Range Status   11/14/2022 3.8 3.5 - 5.1 mmol/L Final     Chloride   Date Value Ref Range Status   11/14/2022 105 95 - 110 mmol/L Final     CO2   Date Value Ref Range Status   11/14/2022 24 23 - 29 mmol/L Final     Glucose   Date Value Ref Range Status   11/14/2022 89 70 - 110 mg/dL Final     BUN   Date Value Ref Range Status   11/14/2022 6 6 - 20 mg/dL Final     Creatinine   Date Value Ref Range Status   11/14/2022 0.7 0.5 - 1.4 mg/dL Final     Calcium   Date Value Ref Range Status   11/14/2022 9.4 8.7 - 10.5 mg/dL Final     Total Protein   Date Value Ref Range Status   11/14/2022 7.2 6.0 - 8.4 g/dL Final     Albumin   Date Value Ref Range Status   11/14/2022 4.1 3.5 - 5.2 g/dL Final     Total Bilirubin   Date Value Ref Range Status   11/14/2022 0.5 0.1 - 1.0 mg/dL Final     Comment:     For infants and newborns, interpretation of results should be based  on gestational age, weight and in agreement with clinical  observations.    Premature Infant recommended reference ranges:  Up to 24 hours.............<8.0 mg/dL  Up to 48  hours............<12.0 mg/dL  3-5 days..................<15.0 mg/dL  6-29 days.................<15.0 mg/dL       Alkaline Phosphatase   Date Value Ref Range Status   11/14/2022 38 (L) 55 - 135 U/L Final     AST   Date Value Ref Range Status   11/14/2022 17 10 - 40 U/L Final     ALT   Date Value Ref Range Status   11/14/2022 17 10 - 44 U/L Final     Anion Gap   Date Value Ref Range Status   11/14/2022 7 (L) 8 - 16 mmol/L Final     eGFR if    Date Value Ref Range Status   06/23/2022 >60.0 >60 mL/min/1.73 m^2 Final     eGFR if non    Date Value Ref Range Status   06/23/2022 >60.0 >60 mL/min/1.73 m^2 Final     Comment:     Calculation used to obtain the estimated glomerular filtration  rate (eGFR) is the CKD-EPI equation.        Lab Results   Component Value Date    HEPBSAG Negative 04/18/2022    HEPBSAB Positive 04/18/2022    HEPBCAB Negative 04/18/2022           MS Impression and Plan:     NEURO MULTIPLE SCLEROSIS IMPRESSION:   MS Status:     Number of relapses in the past year?:  0    Clinical Progression:  Clinically Stable    MRI Progression:  Stable  Plan:     DMT:  No change in management    Symptom Management:  No change in symptom management     Will check 6 mo interval MRIs in May. To be remembered, she did form new lesion on Rebif in past, so will do full scans of brain in spine at 6 mo   F/u in May with Margie Fierro CNS  Labs end of Feb           Problem List Items Addressed This Visit          1 - High    Multiple sclerosis - Primary    Relevant Orders    MRI Brain Demyelinating W W/O Contrast    MRI Cervical Spine Demyelinating W W/O Contrast    MRI Thoracic Spine Demyelinating W W/O Contrast    CBC Auto Differential    Hepatic Function Panel     Other Visit Diagnoses       Prophylactic immunotherapy        Counseling regarding goals of care                Marci Hernandez MD    I spent a total of 40 minutes on the day of the visit.This includes face to face time and  non-face to face time preparing to see the patient (eg, review of tests), obtaining and/or reviewing separately obtained history, documenting clinical information in the electronic or other health record, independently interpreting results and communicating results to the patient/family/caregiver, or care coordinator.

## 2023-01-17 ENCOUNTER — PATIENT MESSAGE (OUTPATIENT)
Dept: NEUROLOGY | Facility: CLINIC | Age: 38
End: 2023-01-17
Payer: COMMERCIAL

## 2023-01-17 ENCOUNTER — SPECIALTY PHARMACY (OUTPATIENT)
Dept: PHARMACY | Facility: CLINIC | Age: 38
End: 2023-01-17
Payer: COMMERCIAL

## 2023-01-17 NOTE — TELEPHONE ENCOUNTER
Test claim: $6,068.14. Outgoing call to notify patient of copay and determine if any FA available, LVM.

## 2023-01-17 NOTE — TELEPHONE ENCOUNTER
Outgoing call to [employer], representative Negar stated the patient is enrolled in the copay program already. Provided updated BI information. Representative stated she will fax over the copay card information once the program has updated the patients deductible and Max OOP information.    Benefit investigation    [employer] per [employer]  Deductible: $3100 (no accumulators)  Max OOP: $9100 ($65 accumulated)  Estimated copay: $6068.14  [facility] in network

## 2023-01-17 NOTE — TELEPHONE ENCOUNTER
Outgoing call to patient for Rebif refill, refill too soon. Confirmed with patient she would like to fill with [facility]. Outgoing call to insurance, confirmed Rebif was filled at [facility]. Outgoing call to [facility], representative Barbara stated the claim was reversed.

## 2023-01-18 NOTE — TELEPHONE ENCOUNTER
Outgoing call to [employer], representative Ade stated the copay card is being reviewed currently and may require an additional 24 hours for review. Informed the representative the patient needs the medication by 1/23, representative marked this as urgent. Will follow up.

## 2023-01-19 NOTE — TELEPHONE ENCOUNTER
Outgoing call to [employer], representative Dulce Maria stated the copay card is being reviewed currently and she will alert the specialist working on the case when her dose is due. Outgoing call to notify patient [facility] is awaiting copay card approval to refill the Rebif, LVM.

## 2023-01-20 NOTE — TELEPHONE ENCOUNTER
Incoming call from rep regarding Rebif copay card. Copay card now processing, but remaining copay is still $971.97. Rep stated she will reach out to funding team and f/u with [facility].

## 2023-01-20 NOTE — TELEPHONE ENCOUNTER
Incoming call from [employer]. Rep provided me with exact Rebif co-pay card information that is already in [facility]. Claim rejected as coverage ended 8/1/22. Rep stated she will send this over to a member of the co-pay card team to work on.

## 2023-01-20 NOTE — TELEPHONE ENCOUNTER
Specialty Pharmacy - Refill Coordination  Specialty Pharmacy - Medication/Referral Authorization    Specialty Medication Orders Linked to Encounter      Flowsheet Row Most Recent Value   Medication #1 interferon beta-1a, albumin, (REBIF REBIDOSE) 44 mcg/0.5 mL PnIj (Order#[id number], Rx#[id number])            Refill Questions - Documented Responses      Flowsheet Row Most Recent Value   Patient Availability and HIPAA Verification    Does patient want to proceed with activity? Yes   HIPAA/medical authority confirmed? Yes   Relationship to patient of person spoken to? Self   Refill Screening Questions    Changes to allergies? No   Changes to medications? No   New conditions since last clinic visit? No   Unplanned office visit, urgent care, ED, or hospital admission in the last 4 weeks? No   How does patient/caregiver feel medication is working? Good   Financial problems or insurance changes? No   How many doses of your specialty medications were missed in the last 4 weeks? 0   Would patient like to speak to a pharmacist? No   When does the patient need to receive the medication? 01/23/23   Refill Delivery Questions    How will the patient receive the medication? MEDRx   When does the patient need to receive the medication? 01/23/23   Shipping Address Home   Address in [facility] confirmed and updated if neccessary? Yes   Expected Copay ($) 0   Is the patient able to afford the medication copay? Yes   Payment Method zero copay   Days supply of Refill 28   Supplies needed? No supplies needed   Refill activity completed? Yes   Refill activity plan Refill scheduled   Shipment/Pickup Date: 01/20/23            Current Outpatient Medications   Medication Sig    B complex-C-E-Zn Tab Take 1 tablet by mouth once daily.    diazePAM (VALIUM) 5 MG tablet Take 1 tablet by mouth 1 hour prior to MRI and1 tablet at the time of the MRI if needed.    drospirenone-ethinyl estradioL (MARCE) 3-0.02 mg per tablet Take 1 tablet by mouth  once daily.    interferon beta-1a, albumin, (REBIF REBIDOSE) 44 mcg/0.5 mL PnIj Inject 0.5 mLs (44 mcg total) into the skin 3 (three) times a week.    ondansetron (ZOFRAN-ODT) 4 MG TbDL Take 1 tablet (4 mg total) by mouth every 8 (eight) hours as needed (nausea).    PAPAYA ENZYME ORAL     PNV no.95/ferrous fum/folic ac (PRENATAL MULTIVITAMINS ORAL) Take by mouth.    REBIF TITRATION PACK 8.8mcg/0.2mL-22 mcg/0.5mL (6) Syrg Inject into the skin.    semaglutide, weight loss, 0.25 mg/0.5 mL PnIj Inject 0.25 mg into the skin every 7 days.    vitamin D 1000 units Tab Take 5,000 Units by mouth once daily.    Last reviewed on 1/9/2023  1:54 PM by Marci Hernandez MD    Review of patient's allergies indicates:  No Known Allergies Last reviewed on  1/17/2023 8:31 AM by Genny James      Tasks added this encounter   1/17/2023 - Referral Authorization  2/13/2023 - Refill Call (Auto Added)   Tasks due within next 3 months   No tasks due.     Deidra Hong, PharmD  [facility]  [address] [place] [id number]  Phone: [phone]  Fax: [phone]

## 2023-01-20 NOTE — TELEPHONE ENCOUNTER
Outgoing call to [employer], representative Izabella stated the copay card update is under review. Representative stated the patient can contact them directly to get the BI information needed. Outgoing call to patient, recommended she call [employer] and update the insurance information, patient verbalized understanding.

## 2023-02-13 ENCOUNTER — SPECIALTY PHARMACY (OUTPATIENT)
Dept: PHARMACY | Facility: CLINIC | Age: 38
End: 2023-02-13
Payer: COMMERCIAL

## 2023-02-13 NOTE — TELEPHONE ENCOUNTER
Outgoing call regarding rebiff refill; per pt, she has enough medication to last until 2/17; informed her that a PA is required, and once approved [facility] will follow up to schedule delivery; routed to [facility] Kristy

## 2023-02-13 NOTE — TELEPHONE ENCOUNTER
PA on file for primary insurance and approved until 11/12/2023. PA required for copay card, annual cap met. Outgoing call to [employer], LVM for return call. Outgoing call to notify patient, recommended she call [employer]. Patient stated she will call and will follow up with [facility].

## 2023-02-13 NOTE — TELEPHONE ENCOUNTER
Incoming call from pt regarding rebif rebidose and [employer]. Pt stated she contacted [facility] and they stated to try to re-run,and  if any problems [facility] needs to contact [facility] pharmacy. Tried to re-run claim, but still rejecting. Routing to pharmacist to contact [facility] pharmacy.

## 2023-02-14 NOTE — TELEPHONE ENCOUNTER
Specialty Pharmacy - Refill Coordination  Specialty Pharmacy - Medication/Referral Authorization    Specialty Medication Orders Linked to Encounter      Flowsheet Row Most Recent Value   Medication #1 interferon beta-1a, albumin, (REBIF REBIDOSE) 44 mcg/0.5 mL PnIj (Order#[id number], Rx#[id number])            Refill Questions - Documented Responses      Flowsheet Row Most Recent Value   Patient Availability and HIPAA Verification    Does patient want to proceed with activity? Yes   HIPAA/medical authority confirmed? Yes   Relationship to patient of person spoken to? Self   Refill Screening Questions    Changes to allergies? No   Changes to medications? No   New conditions since last clinic visit? No   Unplanned office visit, urgent care, ED, or hospital admission in the last 4 weeks? No   How does patient/caregiver feel medication is working? Good   Financial problems or insurance changes? No   How many doses of your specialty medications were missed in the last 4 weeks? 0   Would patient like to speak to a pharmacist? No   When does the patient need to receive the medication? 02/20/23   Refill Delivery Questions    How will the patient receive the medication? MEDRx   When does the patient need to receive the medication? 02/20/23   Shipping Address Home   Address in [facility] confirmed and updated if neccessary? Yes   Expected Copay ($) 0   Is the patient able to afford the medication copay? Yes   Payment Method zero copay   Days supply of Refill 28   Supplies needed? No supplies needed   Refill activity completed? Yes   Refill activity plan Refill scheduled   Shipment/Pickup Date: 02/15/23            Current Outpatient Medications   Medication Sig    B complex-C-E-Zn Tab Take 1 tablet by mouth once daily.    diazePAM (VALIUM) 5 MG tablet Take 1 tablet by mouth 1 hour prior to MRI and1 tablet at the time of the MRI if needed.    drospirenone-ethinyl estradioL (MARCE) 3-0.02 mg per tablet Take 1 tablet by mouth  once daily.    interferon beta-1a, albumin, (REBIF REBIDOSE) 44 mcg/0.5 mL PnIj Inject 0.5 mLs (44 mcg total) into the skin 3 (three) times a week.    ondansetron (ZOFRAN-ODT) 4 MG TbDL Take 1 tablet (4 mg total) by mouth every 8 (eight) hours as needed (nausea).    PAPAYA ENZYME ORAL     PNV no.95/ferrous fum/folic ac (PRENATAL MULTIVITAMINS ORAL) Take by mouth.    REBIF TITRATION PACK 8.8mcg/0.2mL-22 mcg/0.5mL (6) Syrg Inject into the skin.    semaglutide, weight loss, 0.25 mg/0.5 mL PnIj Inject 0.25 mg into the skin every 7 days.    vitamin D 1000 units Tab Take 5,000 Units by mouth once daily.    Last reviewed on 1/9/2023  1:54 PM by Marci Hernandez MD    Review of patient's allergies indicates:  No Known Allergies Last reviewed on  1/17/2023 8:31 AM by Genny James      Tasks added this encounter   No tasks added.   Tasks due within next 3 months   2/13/2023 - Refill Call (Auto Added)     Kristy Abrams, PharmD  [facility]  [address] [place] [id number]  Phone: [phone]  Fax: [phone]

## 2023-02-20 ENCOUNTER — PATIENT MESSAGE (OUTPATIENT)
Dept: PSYCHIATRY | Facility: CLINIC | Age: 38
End: 2023-02-20
Payer: COMMERCIAL

## 2023-03-13 ENCOUNTER — PATIENT MESSAGE (OUTPATIENT)
Dept: NEUROLOGY | Facility: CLINIC | Age: 38
End: 2023-03-13
Payer: COMMERCIAL

## 2023-03-13 ENCOUNTER — SPECIALTY PHARMACY (OUTPATIENT)
Dept: PHARMACY | Facility: CLINIC | Age: 38
End: 2023-03-13
Payer: COMMERCIAL

## 2023-03-13 NOTE — TELEPHONE ENCOUNTER
"Specialty Pharmacy - Refill Coordination    Specialty Medication Orders Linked to Encounter      Flowsheet Row Most Recent Value   Medication #1 interferon beta-1a, albumin, (REBIF REBIDOSE) 44 mcg/0.5 mL PnIj (Order#[id number], Rx#[id number])            Refill Questions - Documented Responses      Flowsheet Row Most Recent Value   Patient Availability and HIPAA Verification    Does patient want to proceed with activity? Yes   HIPAA/medical authority confirmed? Yes   Relationship to patient of person spoken to? Self   Refill Screening Questions    Changes to allergies? No   Changes to medications? No   New conditions since last clinic visit? No   Unplanned office visit, urgent care, ED, or hospital admission in the last 4 weeks? No   How does patient/caregiver feel medication is working? Good  [Patient reported she had a "mild" MS flare for one week around Mardi Gras. Her symptoms were cloudiness of the right eye, fatigue and trouble with handwriting. She denied any missed doses of Rebif. Will notify provider, patient ok with messaging provider]   Financial problems or insurance changes? No   How many doses of your specialty medications were missed in the last 4 weeks? 0   Would patient like to speak to a pharmacist? No   When does the patient need to receive the medication? 03/20/23   Refill Delivery Questions    How will the patient receive the medication? MEDRx   When does the patient need to receive the medication? 03/20/23   Shipping Address Home   Address in [facility] confirmed and updated if neccessary? Yes   Expected Copay ($) 0   Is the patient able to afford the medication copay? Yes   Payment Method zero copay   Days supply of Refill 28   Supplies needed? No supplies needed   Refill activity completed? Yes   Refill activity plan Refill scheduled   Shipment/Pickup Date: 03/16/23            Current Outpatient Medications   Medication Sig    B complex-C-E-Zn Tab Take 1 tablet by mouth once daily.    " diazePAM (VALIUM) 5 MG tablet Take 1 tablet by mouth 1 hour prior to MRI and1 tablet at the time of the MRI if needed.    drospirenone-ethinyl estradioL (MARCE) 3-0.02 mg per tablet Take 1 tablet by mouth once daily.    interferon beta-1a, albumin, (REBIF REBIDOSE) 44 mcg/0.5 mL PnIj Inject 0.5 mLs (44 mcg total) into the skin 3 (three) times a week.    ondansetron (ZOFRAN-ODT) 4 MG TbDL Take 1 tablet (4 mg total) by mouth every 8 (eight) hours as needed (nausea).    PAPAYA ENZYME ORAL     PNV no.95/ferrous fum/folic ac (PRENATAL MULTIVITAMINS ORAL) Take by mouth.    REBIF TITRATION PACK 8.8mcg/0.2mL-22 mcg/0.5mL (6) Syrg Inject into the skin.    semaglutide, weight loss, 0.25 mg/0.5 mL PnIj Inject 0.25 mg into the skin every 7 days.    vitamin D 1000 units Tab Take 5,000 Units by mouth once daily.    Last reviewed on 1/9/2023  1:54 PM by Marci Hernandez MD    Review of patient's allergies indicates:  No Known Allergies Last reviewed on  1/17/2023 8:31 AM by Genny James    Patient reported she is having surgery in one month and will be having her pre-op appointment today with her surgeon. Patient will confirm if Rebif can continue around the time of her surgery.    Tasks added this encounter   No tasks added.   Tasks due within next 3 months   3/13/2023 - Refill Call (Auto Added)     Kristy Abrams, PharmD  [facility]  [address] [facility] [address]  Phone: [phone]  Fax: [phone]

## 2023-03-17 ENCOUNTER — PATIENT MESSAGE (OUTPATIENT)
Dept: NEUROLOGY | Facility: CLINIC | Age: 38
End: 2023-03-17
Payer: COMMERCIAL

## 2023-03-30 ENCOUNTER — PATIENT MESSAGE (OUTPATIENT)
Dept: NEUROLOGY | Facility: CLINIC | Age: 38
End: 2023-03-30
Payer: COMMERCIAL

## 2023-04-03 ENCOUNTER — ANESTHESIA EVENT (OUTPATIENT)
Dept: SURGERY | Facility: OTHER | Age: 38
End: 2023-04-03

## 2023-04-03 ENCOUNTER — HOSPITAL ENCOUNTER (OUTPATIENT)
Dept: PREADMISSION TESTING | Facility: OTHER | Age: 38
Discharge: HOME | End: 2023-04-03
Attending: PLASTIC SURGERY
Payer: COMMERCIAL

## 2023-04-03 VITALS
HEIGHT: 65 IN | BODY MASS INDEX: 21.16 KG/M2 | DIASTOLIC BLOOD PRESSURE: 62 MMHG | SYSTOLIC BLOOD PRESSURE: 101 MMHG | WEIGHT: 127 LBS | HEART RATE: 70 BPM | TEMPERATURE: 98 F | OXYGEN SATURATION: 100 % | RESPIRATION RATE: 17 BRPM

## 2023-04-03 DIAGNOSIS — Z41.1 ELECTIVE PROCEDURE FOR UNACCEPTABLE COSMETIC APPEARANCE: Primary | ICD-10-CM

## 2023-04-03 RX ORDER — SODIUM CHLORIDE 0.9 % (FLUSH) 0.9 %
10 SYRINGE (ML) INJECTION
Status: CANCELLED | OUTPATIENT
Start: 2023-04-03

## 2023-04-03 RX ORDER — LIDOCAINE HYDROCHLORIDE 10 MG/ML
1 INJECTION, SOLUTION EPIDURAL; INFILTRATION; INTRACAUDAL; PERINEURAL ONCE
Status: CANCELLED | OUTPATIENT
Start: 2023-04-03 | End: 2023-04-03

## 2023-04-03 NOTE — ANESTHESIA PREPROCEDURE EVALUATION
04/03/2023  King Perez is a 38 y.o., female.      Pre-op Assessment    I have reviewed the Patient Summary Reports.     I have reviewed the Nursing Notes. I have reviewed the NPO Status.   I have reviewed the Medications.     Review of Systems  Anesthesia Hx:  No problems with previous Anesthesia  Denies Family Hx of Anesthesia complications.   Denies Personal Hx of Anesthesia complications.   Social:  Non-Smoker    Hematology/Oncology:  Hematology Normal   Oncology Normal     EENT/Dental:EENT/Dental Normal   Cardiovascular:  Cardiovascular Normal Exercise tolerance: good     Pulmonary:  Pulmonary Normal    Renal/:  Renal/ Normal     Musculoskeletal:  Musculoskeletal Normal    Neurological:  Neurology Normal Multiple Sclerosis. Diagnosed 8 years ago. Under great control   Endocrine:  Endocrine Normal    Dermatological:  Skin Normal    Psych:  Psychiatric Normal           Physical Exam  General: Well nourished, Cooperative, Oriented and Alert    Airway:  Mouth Opening: Normal  TM Distance: Normal  Neck ROM: Normal ROM    Dental:  Intact        Anesthesia Plan  Type of Anesthesia, risks & benefits discussed:    Anesthesia Type: Gen ETT  Intra-op Monitoring Plan: Standard ASA Monitors  Post Op Pain Control Plan: multimodal analgesia  Induction:  IV  Airway Plan: Video and Direct  Informed Consent: Informed consent signed with the Patient and all parties understand the risks and agree with anesthesia plan.  All questions answered.   ASA Score: 2  Day of Surgery Review of History & Physical: H&P Update referred to the surgeon/provider.    Ready For Surgery From Anesthesia Perspective.     .

## 2023-04-03 NOTE — DISCHARGE INSTRUCTIONS
Information to Prepare you for your Surgery    PRE-ADMIT TESTING -  [phone]    [address] [facility]          Your surgery has been scheduled at [facility]. We are pleased to have the opportunity to serve you. For Further Information please call [phone].    On the day of surgery please report to the Information Desk on the 1st floor.    CONTACT YOUR PHYSICIAN'S OFFICE THE DAY PRIOR TO YOUR SURGERY TO OBTAIN YOUR ARRIVAL TIME.     The evening before surgery do not eat anything after 9 p.m. ( this includes hard candy, chewing gum and mints).  You may only have GATORADE, POWERADE AND WATER  from 9 p.m. until you leave your home.   DO NOT DRINK ANY LIQUIDS ON THE WAY TO THE HOSPITAL.      Why does your anesthesiologist allow you to drink Gatorade/Powerade before surgery?  Gatorade/Powerade helps to increase your comfort before surgery and to decrease your nausea after surgery. The carbohydrates in Gatorade/Powerade help reduce your body's stress response to surgery.  If you are a diabetic-drink only water prior to surgery.      Current Visitor policy(12/27/2021) - Patients may have 2 visitors pre and post procedure. Only 2 visitors will be allowed in the Surgical building with the patient. No one under the age of 12 will be allowed into the facility.    SPECIAL MEDICATION INSTRUCTIONS: TAKE medications checked off by the Anesthesiologist on your Medication List.    Angiogram Patients: Take medications as instructed by your physician, including aspirin.     Surgery Patients:    If you take ASPIRIN - Your PHYSICIAN/SURGEON will need to inform you IF/OR when you need to stop taking aspirin prior to your surgery.     The week prior to surgery do not ot take any medications containing IBUPROFEN or NSAIDS ( Advil, Motrin, Goodys, BC, Aleve, Naproxen etc) If you are not sure if you should take a medicine please call your surgeon's office.  Ok to take  Tylenol    Do Not Wear any make-up (especially eye make-up) to surgery. Please remove any false eyelashes or eyelash extensions. If you arrive the day of surgery with makeup/eyelashes on you will be required to remove prior to surgery. (There is a risk of corneal abrasions if eye makeup/eyelash extensions are not removed)      Leave all valuables at home.   Do Not wear any jewelry or watches, including any metal in body piercings. Jewelry must be removed prior to coming to the hospital.  There is a possibility that rings that are unable to be removed may be cut off if they are on the surgical extremity.    Please remove all hair extensions, wigs, clips and any other metal accessories/ ornaments from your hair.  These items may pose a flammable/fire risk in Surgery and must be removed.    Do not shave your surgical area at least 5 days prior to your surgery. The surgical prep will be performed at the hospital according to Infection Control regulations.    Contact Lens must be removed before surgery. Either do not wear the contact lens or bring a case and solution for storage.  Please bring a container for eyeglasses or dentures as required.  Bring any paperwork your physician has provided, such as consent forms,  history and physicals, doctor's orders, etc.   Bring comfortable clothes that are loose fitting to wear upon discharge. Take into consideration the type of surgery being performed.  Maintain your diet as advised per your physician the day prior to surgery.      Adequate rest the night before surgery is advised.   Park in the Parking lot behind the hospital or in the [facility] across the street from the parking lot. Parking is complimentary.  If you will be discharged the same day as your procedure, please arrange for a responsible adult to drive you home or to accompany you if traveling by taxi.   YOU WILL NOT BE PERMITTED TO DRIVE OR TO LEAVE THE HOSPITAL ALONE AFTER SURGERY.   If you are  being discharged the same day, it is strongly recommended that you arrange for someone to remain with you for the first 24 hrs following your surgery.    The Surgeon will speak to your family/visitor after your surgery regarding the outcome of your surgery and post op care.  The Surgeon may speak to you after your surgery, but there is a possibility you may not remember the details.  Please check with your family members regarding the conversation with the Surgeon.    We strongly recommend whoever is bringing you home be present for discharge instructions.  This will ensure a thorough understanding for your post op home care.    ALL CHILDREN MUST ALWAYS BE ACCOMPANIED BY AN ADULT.    Visitors-Refer to current Visitor policy handouts.    Thank you for your cooperation.  The Staff of [facility].            Bathing Instructions with Hibiclens    Shower the evening before and morning of your procedure with Chlorhexidine (Hibiclens)  do not use Chlorhexidine on your face or genitals. Do not get in your eyes.  Wash your face with water and your regular face wash/soap  Use your regular shampoo  Apply Chlorhexidine (Hibiclens) directly on your skin or on a wet washcloth and wash gently. When showering: Move away from the shower stream when applying Chlorhexidine (Hibiclens) to avoid rinsing off too soon.  Rinse thoroughly with warm water  Do not dilute Chlorhexidine (Hibiclens)   Dry off as usual, do not use any deodorant, powder, body lotions, perfume, after shave or cologne.

## 2023-04-10 ENCOUNTER — SPECIALTY PHARMACY (OUTPATIENT)
Dept: PHARMACY | Facility: CLINIC | Age: 38
End: 2023-04-10
Payer: COMMERCIAL

## 2023-04-11 ENCOUNTER — ANESTHESIA (OUTPATIENT)
Dept: SURGERY | Facility: OTHER | Age: 38
End: 2023-04-11

## 2023-04-11 ENCOUNTER — HOSPITAL ENCOUNTER (OUTPATIENT)
Facility: OTHER | Age: 38
Discharge: HOME | End: 2023-04-11
Attending: PLASTIC SURGERY | Admitting: PLASTIC SURGERY

## 2023-04-11 DIAGNOSIS — Z41.1 ELECTIVE PROCEDURE FOR UNACCEPTABLE COSMETIC APPEARANCE: Primary | ICD-10-CM

## 2023-04-11 LAB
B-HCG UR QL: NEGATIVE
CTP QC/QA: YES

## 2023-04-11 PROCEDURE — 88305 TISSUE EXAM BY PATHOLOGIST: ICD-10-PCS | Mod: 26,,, | Performed by: PATHOLOGY

## 2023-04-11 PROCEDURE — 36000707: Performed by: PLASTIC SURGERY

## 2023-04-11 PROCEDURE — 88305 TISSUE EXAM BY PATHOLOGIST: CPT | Mod: 26,,, | Performed by: PATHOLOGY

## 2023-04-11 PROCEDURE — 63600175 PHARM REV CODE 636 W HCPCS: Performed by: NURSE ANESTHETIST, CERTIFIED REGISTERED

## 2023-04-11 PROCEDURE — 25000003 PHARM REV CODE 250: Performed by: NURSE ANESTHETIST, CERTIFIED REGISTERED

## 2023-04-11 PROCEDURE — 37000009 HC ANESTHESIA EA ADD 15 MINS: Performed by: PLASTIC SURGERY

## 2023-04-11 PROCEDURE — P9045 ALBUMIN (HUMAN), 5%, 250 ML: HCPCS | Mod: JZ,JG | Performed by: NURSE ANESTHETIST, CERTIFIED REGISTERED

## 2023-04-11 PROCEDURE — 71000016 HC POSTOP RECOV ADDL HR: Performed by: PLASTIC SURGERY

## 2023-04-11 PROCEDURE — 37000008 HC ANESTHESIA 1ST 15 MINUTES: Performed by: PLASTIC SURGERY

## 2023-04-11 PROCEDURE — 36000706: Performed by: PLASTIC SURGERY

## 2023-04-11 PROCEDURE — 88305 TISSUE EXAM BY PATHOLOGIST: CPT | Performed by: PATHOLOGY

## 2023-04-11 PROCEDURE — 71000033 HC RECOVERY, INTIAL HOUR: Performed by: PLASTIC SURGERY

## 2023-04-11 PROCEDURE — 81025 URINE PREGNANCY TEST: CPT | Performed by: ANESTHESIOLOGY

## 2023-04-11 PROCEDURE — 71000039 HC RECOVERY, EACH ADD'L HOUR: Performed by: PLASTIC SURGERY

## 2023-04-11 PROCEDURE — 63600175 PHARM REV CODE 636 W HCPCS: Performed by: PLASTIC SURGERY

## 2023-04-11 PROCEDURE — 63600175 PHARM REV CODE 636 W HCPCS: Performed by: ANESTHESIOLOGY

## 2023-04-11 PROCEDURE — 71000015 HC POSTOP RECOV 1ST HR: Performed by: PLASTIC SURGERY

## 2023-04-11 RX ORDER — SODIUM CHLORIDE 0.9 % (FLUSH) 0.9 %
3 SYRINGE (ML) INJECTION
Status: DISCONTINUED | OUTPATIENT
Start: 2023-04-11 | End: 2023-04-11 | Stop reason: HOSPADM

## 2023-04-11 RX ORDER — PROPOFOL 10 MG/ML
VIAL (ML) INTRAVENOUS
Status: DISCONTINUED | OUTPATIENT
Start: 2023-04-11 | End: 2023-04-11

## 2023-04-11 RX ORDER — KETAMINE HCL IN 0.9 % NACL 50 MG/5 ML
SYRINGE (ML) INTRAVENOUS
Status: DISCONTINUED | OUTPATIENT
Start: 2023-04-11 | End: 2023-04-11

## 2023-04-11 RX ORDER — HYDROMORPHONE HYDROCHLORIDE 2 MG/ML
0.4 INJECTION, SOLUTION INTRAMUSCULAR; INTRAVENOUS; SUBCUTANEOUS EVERY 5 MIN PRN
Status: DISCONTINUED | OUTPATIENT
Start: 2023-04-11 | End: 2023-04-11 | Stop reason: HOSPADM

## 2023-04-11 RX ORDER — TRANEXAMIC ACID 100 MG/ML
INJECTION, SOLUTION INTRAVENOUS
Status: DISCONTINUED | OUTPATIENT
Start: 2023-04-11 | End: 2023-04-11

## 2023-04-11 RX ORDER — MEPERIDINE HYDROCHLORIDE 25 MG/ML
12.5 INJECTION INTRAMUSCULAR; INTRAVENOUS; SUBCUTANEOUS ONCE AS NEEDED
Status: DISCONTINUED | OUTPATIENT
Start: 2023-04-11 | End: 2023-04-11 | Stop reason: HOSPADM

## 2023-04-11 RX ORDER — SODIUM CHLORIDE, SODIUM LACTATE, POTASSIUM CHLORIDE, CALCIUM CHLORIDE 600; 310; 30; 20 MG/100ML; MG/100ML; MG/100ML; MG/100ML
INJECTION, SOLUTION INTRAVENOUS CONTINUOUS
Status: DISCONTINUED | OUTPATIENT
Start: 2023-04-11 | End: 2023-04-11 | Stop reason: HOSPADM

## 2023-04-11 RX ORDER — ALBUMIN HUMAN 50 G/1000ML
SOLUTION INTRAVENOUS CONTINUOUS PRN
Status: DISCONTINUED | OUTPATIENT
Start: 2023-04-11 | End: 2023-04-11

## 2023-04-11 RX ORDER — LIDOCAINE HYDROCHLORIDE 20 MG/ML
INJECTION INTRAVENOUS
Status: DISCONTINUED | OUTPATIENT
Start: 2023-04-11 | End: 2023-04-11

## 2023-04-11 RX ORDER — CEFAZOLIN SODIUM 1 G/3ML
2 INJECTION, POWDER, FOR SOLUTION INTRAMUSCULAR; INTRAVENOUS
Status: COMPLETED | OUTPATIENT
Start: 2023-04-11 | End: 2023-04-11

## 2023-04-11 RX ORDER — DEXAMETHASONE SODIUM PHOSPHATE 4 MG/ML
INJECTION, SOLUTION INTRA-ARTICULAR; INTRALESIONAL; INTRAMUSCULAR; INTRAVENOUS; SOFT TISSUE
Status: DISCONTINUED | OUTPATIENT
Start: 2023-04-11 | End: 2023-04-11

## 2023-04-11 RX ORDER — CEPHALEXIN 500 MG/1
500 CAPSULE ORAL EVERY 6 HOURS
Qty: 28 CAPSULE | Refills: 0 | Status: SHIPPED | OUTPATIENT
Start: 2023-04-11 | End: 2023-04-18

## 2023-04-11 RX ORDER — HYDROCODONE BITARTRATE AND ACETAMINOPHEN 5; 325 MG/1; MG/1
1 TABLET ORAL EVERY 4 HOURS PRN
Qty: 30 TABLET | Refills: 0 | Status: SHIPPED | OUTPATIENT
Start: 2023-04-11 | End: 2023-09-14

## 2023-04-11 RX ORDER — PROPOFOL 10 MG/ML
VIAL (ML) INTRAVENOUS CONTINUOUS PRN
Status: DISCONTINUED | OUTPATIENT
Start: 2023-04-11 | End: 2023-04-11

## 2023-04-11 RX ORDER — FENTANYL CITRATE 50 UG/ML
INJECTION, SOLUTION INTRAMUSCULAR; INTRAVENOUS
Status: DISCONTINUED | OUTPATIENT
Start: 2023-04-11 | End: 2023-04-11

## 2023-04-11 RX ORDER — OXYCODONE HYDROCHLORIDE 5 MG/1
5 TABLET ORAL
Status: DISCONTINUED | OUTPATIENT
Start: 2023-04-11 | End: 2023-04-11 | Stop reason: HOSPADM

## 2023-04-11 RX ORDER — ONDANSETRON 2 MG/ML
INJECTION INTRAMUSCULAR; INTRAVENOUS
Status: DISCONTINUED | OUTPATIENT
Start: 2023-04-11 | End: 2023-04-11

## 2023-04-11 RX ORDER — LIDOCAINE HYDROCHLORIDE 10 MG/ML
1 INJECTION, SOLUTION EPIDURAL; INFILTRATION; INTRACAUDAL; PERINEURAL ONCE
Status: DISCONTINUED | OUTPATIENT
Start: 2023-04-11 | End: 2023-04-11 | Stop reason: HOSPADM

## 2023-04-11 RX ORDER — ONDANSETRON 2 MG/ML
4 INJECTION INTRAMUSCULAR; INTRAVENOUS DAILY PRN
Status: DISCONTINUED | OUTPATIENT
Start: 2023-04-11 | End: 2023-04-11 | Stop reason: HOSPADM

## 2023-04-11 RX ORDER — ROCURONIUM BROMIDE 10 MG/ML
INJECTION, SOLUTION INTRAVENOUS
Status: DISCONTINUED | OUTPATIENT
Start: 2023-04-11 | End: 2023-04-11

## 2023-04-11 RX ORDER — HYDROMORPHONE HYDROCHLORIDE 2 MG/ML
INJECTION, SOLUTION INTRAMUSCULAR; INTRAVENOUS; SUBCUTANEOUS
Status: DISCONTINUED | OUTPATIENT
Start: 2023-04-11 | End: 2023-04-11

## 2023-04-11 RX ORDER — ACETAMINOPHEN 10 MG/ML
INJECTION, SOLUTION INTRAVENOUS
Status: DISCONTINUED | OUTPATIENT
Start: 2023-04-11 | End: 2023-04-11

## 2023-04-11 RX ADMIN — HYDROMORPHONE HYDROCHLORIDE 0.4 MG: 2 INJECTION INTRAMUSCULAR; INTRAVENOUS; SUBCUTANEOUS at 02:04

## 2023-04-11 RX ADMIN — FENTANYL CITRATE 50 MCG: 50 INJECTION, SOLUTION INTRAMUSCULAR; INTRAVENOUS at 12:04

## 2023-04-11 RX ADMIN — ROCURONIUM BROMIDE 20 MG: 10 INJECTION, SOLUTION INTRAVENOUS at 11:04

## 2023-04-11 RX ADMIN — FENTANYL CITRATE 50 MCG: 50 INJECTION, SOLUTION INTRAMUSCULAR; INTRAVENOUS at 10:04

## 2023-04-11 RX ADMIN — HYDROMORPHONE HYDROCHLORIDE 0.2 MG: 2 INJECTION INTRAMUSCULAR; INTRAVENOUS; SUBCUTANEOUS at 02:04

## 2023-04-11 RX ADMIN — ALBUMIN (HUMAN): 12.5 SOLUTION INTRAVENOUS at 10:04

## 2023-04-11 RX ADMIN — PROPOFOL 75 MCG/KG/MIN: 10 INJECTION, EMULSION INTRAVENOUS at 10:04

## 2023-04-11 RX ADMIN — CEFAZOLIN 2 G: 330 INJECTION, POWDER, FOR SOLUTION INTRAMUSCULAR; INTRAVENOUS at 10:04

## 2023-04-11 RX ADMIN — FENTANYL CITRATE 50 MCG: 50 INJECTION, SOLUTION INTRAMUSCULAR; INTRAVENOUS at 01:04

## 2023-04-11 RX ADMIN — DEXAMETHASONE SODIUM PHOSPHATE 8 MG: 4 INJECTION, SOLUTION INTRAMUSCULAR; INTRAVENOUS at 10:04

## 2023-04-11 RX ADMIN — SUGAMMADEX 200 MG: 100 INJECTION, SOLUTION INTRAVENOUS at 02:04

## 2023-04-11 RX ADMIN — SODIUM CHLORIDE, SODIUM LACTATE, POTASSIUM CHLORIDE, AND CALCIUM CHLORIDE: .6; .31; .03; .02 INJECTION, SOLUTION INTRAVENOUS at 01:04

## 2023-04-11 RX ADMIN — FENTANYL CITRATE 50 MCG: 50 INJECTION, SOLUTION INTRAMUSCULAR; INTRAVENOUS at 02:04

## 2023-04-11 RX ADMIN — ALBUMIN (HUMAN): 12.5 SOLUTION INTRAVENOUS at 11:04

## 2023-04-11 RX ADMIN — CEFAZOLIN 2 G: 330 INJECTION, POWDER, FOR SOLUTION INTRAMUSCULAR; INTRAVENOUS at 02:04

## 2023-04-11 RX ADMIN — TRANEXAMIC ACID 1000 MG: 100 INJECTION, SOLUTION INTRAVENOUS at 01:04

## 2023-04-11 RX ADMIN — ACETAMINOPHEN 1000 MG: 10 INJECTION, SOLUTION INTRAVENOUS at 01:04

## 2023-04-11 RX ADMIN — TRANEXAMIC ACID 1000 MG: 100 INJECTION, SOLUTION INTRAVENOUS at 11:04

## 2023-04-11 RX ADMIN — LIDOCAINE HYDROCHLORIDE 80 MG: 20 INJECTION, SOLUTION INTRAVENOUS at 10:04

## 2023-04-11 RX ADMIN — GLYCOPYRROLATE 0.2 MG: 0.2 INJECTION, SOLUTION INTRAMUSCULAR; INTRAVITREAL at 10:04

## 2023-04-11 RX ADMIN — Medication 30 MG: at 10:04

## 2023-04-11 RX ADMIN — ROCURONIUM BROMIDE 20 MG: 10 INJECTION, SOLUTION INTRAVENOUS at 12:04

## 2023-04-11 RX ADMIN — PROPOFOL 140 MG: 10 INJECTION, EMULSION INTRAVENOUS at 10:04

## 2023-04-11 RX ADMIN — FENTANYL CITRATE 100 MCG: 50 INJECTION, SOLUTION INTRAMUSCULAR; INTRAVENOUS at 10:04

## 2023-04-11 RX ADMIN — FENTANYL CITRATE 50 MCG: 50 INJECTION, SOLUTION INTRAMUSCULAR; INTRAVENOUS at 11:04

## 2023-04-11 RX ADMIN — ONDANSETRON HYDROCHLORIDE 4 MG: 2 INJECTION INTRAMUSCULAR; INTRAVENOUS at 02:04

## 2023-04-11 RX ADMIN — ROCURONIUM BROMIDE 50 MG: 10 INJECTION, SOLUTION INTRAVENOUS at 10:04

## 2023-04-11 RX ADMIN — Medication 20 MG: at 01:04

## 2023-04-11 RX ADMIN — SODIUM CHLORIDE, SODIUM LACTATE, POTASSIUM CHLORIDE, AND CALCIUM CHLORIDE: .6; .31; .03; .02 INJECTION, SOLUTION INTRAVENOUS at 09:04

## 2023-04-11 NOTE — OP NOTE
[facility]  Plastic Surgery  Operative Note    SUMMARY     Date of Procedure: 4/11/2023     Procedure: Procedure(s) (LRB):  MASTOPEXY (Bilateral) using superior medial wise pattern technique    Surgeon(s) and Role:     * Katie Graham Jr., MD - Primary    Assisting Surgeon: None    Pre-Operative Diagnosis: Elective procedure for unacceptable cosmetic appearance [Z41.1]    Post-Operative Diagnosis:  Same    Anesthesia: General    Operative Findings (including complications, if any):  Bilateral dense breast tissue with significant laxity and ptosis of the breasts.        Estimated Blood Loss (EBL):  50 mL           Implants: * No implants in log *    Other:  Right breast tissue removed 104 g   Left breast tissue removed 120 g    Specimens:   Specimen (24h ago, onward)       Start     Ordered    04/11/23 1344  Specimen to Pathology, Surgery Breast  Once        Comments: Pre-op Diagnosis: Elective procedure for unacceptable cosmetic appearance [Z41.1]Procedure(s):MASTOPEXY Number of specimens: 2Name of specimens: 1. Right breast tissue and skin 2. Left breast tissue and skin     References:    Click here for ordering Quick Tip   Question Answer Comment   Procedure Type: Breast    Specimen Class: Routine/Screening    Which provider would you like to cc? KATIE GRAHAM JR    Release to patient Immediate        04/11/23 1345                            Condition: Good    Disposition: PACU - hemodynamically stable.    Attestation: I was present and scrubbed for the entire procedure.      Indications:  King Perez is a 38-year-old female who presented to my clinic with complaints of unacceptable cosmetic appearance.  The patient was dissatisfied with the appearance of her breasts.  She was most unhappy with the position of her breasts.  We discussed a bilateral mastopexy versus bilateral mastopexy with augmentation.  After this discussion she settled on a bilateral mastopexy.  We  discussed the risks benefits alternatives in detail.  Informed consent was obtained the patient was then scheduled for the procedure.    Procedure in detail  After proper identification of King Perez in the pre-operative area where Wise pattern viera were placed and drawn to each breast. She was wheeled to the operating room on the hospital stretcher.  She was placed on the operating table and pressure points were padded and checked at this time.  A timeout was called when surgeons, nurses and Anesthesia agreed upon the patient's procedure.  She was then induced under general anesthesia via ET tube.  She was given antibiotics for prophylaxis.  The arms were then strapped to the arm board placed at 45 degrees.  An indwelling yoder catheter was placed using sterile technique.   Chest was then prepped and draped in usual sterile fashion.  I first turned my attention to the smaller of the two breasts.  The right breast was then placed into an Esmarch tourniquet and 38 mm cookie cutter was placed around the nipple areolar complex and the superior medial pedicle was then deepithelialized using a 10 blade scalpel.  Next, the superior medial pedicle was then defined into the breast parenchyma.  The Esmarch was then removed and the remaining Bender pattern incisions were then incised through the dermis.  The pedicle was then carried down to the anterior chest wall using electrocautery after defining the pedicle.  The medial and lateral pillars were then defined and the lower portion of the breast tissue was then amputated and removed due to the noted laxity and lack of support.  This tissue was then passed off for weight.  The remaining breast tissue within the key portion of the Wise pattern was also removed and passed off for weight. Next the breast was then irrigated copious amounts normal saline.  Hemostasis was achieved using electrocautery.  The breast was then inset using 2-0 Vicryl sutures to inset the  pedicle and the pillars followed by closure of the skin using a 3-0 Vicryl in a deep dermal interrupted fashion followed by closure of skin using a 4-0 Monocryl in the subcuticular fashion.     I then turned my attention to the left breast.The left breast was then placed through an Esmarch tourniquet and a 38mm cookie cutter was used to find the nipple areolar complex.  This was then incised using a 15 blade scalpel and deepithelialized.  Next the superior medial pedicle was then defined using electrocautery and the Esmarch was then removed and the remaining Bender pattern incisions were also incised.  The medial and lateral pillars were then defined and the lower portion of the breast was then amputated and passed off for weight.  The remaining breast tissue within the key portions of the Wise pattern was also removed.  The breast was then provisionally closed.  The patient was then placed in a semi-upright position and I evaluated for symmetry.   At that time, additional breast tissue was removed and passed off for weight.  The patient was placed in the upright position several times to achieve better symmetry between the 2.  The final weight from the right breast was 104g and the final weight from the left breast was 120 g.  This was then sent to Pathology for permanent section.  The left breast was then opened, irrigated with copious amounts of normal saline.  Hemostasis was achieved using electrocautery.  It was then closed in layers using 2-0 Vicryl sutures to inset the pedicle and the pillars followed by closure of the skin using a 3-0 Vicryl in a deep dermal interrupted fashion followed by closure of skin using a 4-0 Monocryl in the subcuticular fashion.   The wounds were then cleaned and dried.  Steri-strips were then applied along the incisions. She was then placed into a postoperative bra with ABD padding.  This concluded the procedure.  At the end of the case the nipples were pink with good capillary  refill and no signs of ischemia or cyanosis.   The patient tolerated the procedure well without any complications.  At the end of the case, needle and sponge counts were correct x2 and I was present and scrubbed all aspects of the procedure.  The patient was then awoken from anesthesia and taken to PACU for further recovery.

## 2023-04-11 NOTE — ANESTHESIA PROCEDURE NOTES
Intubation    Date/Time: 4/11/2023 10:32 AM  Performed by: Kaila Abraham CRNA  Authorized by: Royer Burleson MD     Intubation:     Induction:  Intravenous    Intubated:  Postinduction    Mask Ventilation:  Easy mask    Attempts:  1    Attempted By:  CRNA    Method of Intubation:  Video laryngoscopy    Blade:  Hamilton 3    Laryngeal View Grade: Grade I - full view of cords      Difficult Airway Encountered?: No      Complications:  None    Airway Device:  Oral endotracheal tube    Airway Device Size:  7.0    Style/Cuff Inflation:  Cuffed    Tube secured:  21    Secured at:  The lips    Placement Verified By:  Capnometry    Complicating Factors:  Anterior larynx and retrognathia    Findings Post-Intubation:  BS equal bilateral and atraumatic/condition of teeth unchanged

## 2023-04-11 NOTE — ANESTHESIA POSTPROCEDURE EVALUATION
Anesthesia Post Evaluation    Patient: King Perez    Procedure(s) Performed: Procedure(s) (LRB):  MASTOPEXY (Bilateral)    Final Anesthesia Type: general      Patient location during evaluation: PACU  Patient participation: Yes- Able to Participate  Level of consciousness: awake and alert  Post-procedure vital signs: reviewed and stable  Pain management: adequate  Airway patency: patent    PONV status at discharge: No PONV  Anesthetic complications: no      Cardiovascular status: blood pressure returned to baseline  Respiratory status: unassisted and spontaneous ventilation  Hydration status: euvolemic  Follow-up not needed.          Vitals Value Taken Time   /58 04/11/23 1502   Temp 36.8 °C (98.3 °F) 04/11/23 1458   Pulse 87 04/11/23 1504   Resp 16 04/11/23 1458   SpO2 100 % 04/11/23 1504   Vitals shown include unvalidated device data.      No case tracking events are documented in the log.      Pain/Emilio Score: No data recorded

## 2023-04-11 NOTE — CHAPLAIN
04/11/23 1152   Clinical Encounter Type   Visit Type Initial Visit   Visit Category Pre-Op   Visited With Patient and family together   Number of Family Visited 1   Length of Visit 10 Minutes   Patient Spiritual Encounters   Care Provided Compassionate presence;Reflective listening   Patient Coping Accepting   Comments - Patient pt declined spiritual care at this time   Family Spiritual Encounters   Care Provided Compassionate presence   Comments - Family  expressed no spiritual needs at this time

## 2023-04-11 NOTE — TRANSFER OF CARE
Anesthesia Transfer of Care Note    Patient: King Perez    Procedure(s) Performed: Procedure(s) (LRB):  MASTOPEXY (Bilateral)    Patient location: PACU    Anesthesia Type: general    Transport from OR: Transported from OR on 2-3 L/min O2 by NC with adequate spontaneous ventilation    Post pain: adequate analgesia    Post assessment: no apparent anesthetic complications    Post vital signs: stable    Level of consciousness: awake and alert    Nausea/Vomiting: no nausea/vomiting    Complications: none    Transfer of care protocol was followed      Last vitals:   Visit Vitals  /70 (BP Location: Left arm, Patient Position: Sitting)   Pulse 77   Temp 36.7 °C (98 °F) (Oral)   Resp 18   LMP 04/07/2023   SpO2 100%   Breastfeeding No

## 2023-04-11 NOTE — BRIEF OP NOTE
[facility] - Surgery ([place])  Brief Operative Note    Surgery Date: 4/11/2023     Surgeon(s) and Role:     * Katie Graham Jr., MD - Primary    Assisting Surgeon: None    Pre-op Diagnosis:  Elective procedure for unacceptable cosmetic appearance [Z41.1]    Post-op Diagnosis:  Post-Op Diagnosis Codes:     * Elective procedure for unacceptable cosmetic appearance [Z41.1]    Procedure(s) (LRB):  MASTOPEXY (Bilateral)    Anesthesia: General    Operative Findings: bilateral ptotic breasts    Estimated Blood Loss: 50ml  Specimens:   Specimen (24h ago, onward)       Start     Ordered    04/11/23 1344  Specimen to Pathology, Surgery Breast  Once        Comments: Pre-op Diagnosis: Elective procedure for unacceptable cosmetic appearance [Z41.1]Procedure(s):MASTOPEXY Number of specimens: 2Name of specimens: 1. Right breast tissue and skin 2. Left breast tissue and skin     References:    Click here for ordering Quick Tip   Question Answer Comment   Procedure Type: Breast    Specimen Class: Routine/Screening    Which provider would you like to cc? KATIE GRAHAM JR    Release to patient Immediate        04/11/23 1345                      Discharge Note    OUTCOME: Patient tolerated treatment/procedure well without complication and is now ready for discharge.    DISPOSITION: Home or Self Care    FINAL DIAGNOSIS:  Elective procedure for unacceptable cosmetic appearance    FOLLOWUP: In clinic      DISCHARGE INSTRUCTIONS:    Discharge Procedure Orders   Diet general     Ice to affected area     Lifting restrictions     Call MD for:  temperature >100.4     Call MD for:  persistent nausea and vomiting     Call MD for:  severe uncontrolled pain     Call MD for:  difficulty breathing, headache or visual disturbances     Call MD for:  redness, tenderness, or signs of infection (pain, swelling, redness, odor or green/yellow discharge around incision site)     Call MD for:  hives     Call MD for:  persistent dizziness or  light-headedness     Call MD for:  extreme fatigue     Leave dressing on - Keep it clean, dry, and intact until clinic visit

## 2023-04-11 NOTE — PLAN OF CARE
King Perez has met all discharge criteria from Phase II. Vital Signs are stable, ambulating  without difficulty.Pain is now under control and tolerable for the pt. Pain score 4 at this time.  Discharge instructions given, patient verbalized understanding. Discharged from facility via wheelchair in stable condition.

## 2023-04-12 VITALS
TEMPERATURE: 98 F | HEART RATE: 67 BPM | RESPIRATION RATE: 18 BRPM | SYSTOLIC BLOOD PRESSURE: 103 MMHG | DIASTOLIC BLOOD PRESSURE: 59 MMHG | OXYGEN SATURATION: 97 %

## 2023-04-13 RX ORDER — INTERFERON BETA-1A 44 UG/.5ML
44 INJECTION, SOLUTION SUBCUTANEOUS
Qty: 36 PEN | Refills: 0 | Status: SHIPPED | OUTPATIENT
Start: 2023-04-14 | End: 2023-07-13 | Stop reason: SDUPTHER

## 2023-04-13 NOTE — TELEPHONE ENCOUNTER
Outgoing call to patient, she stated she has 2 doses remaining on hand. Will need a dose by 4/18. Refill request sent to provider.

## 2023-04-13 NOTE — TELEPHONE ENCOUNTER
Specialty Pharmacy - Refill Coordination    Specialty Medication Orders Linked to Encounter      Flowsheet Row Most Recent Value   Medication #1 interferon beta-1a, albumin, (REBIF REBIDOSE) 44 mcg/0.5 mL PnIj (Order#[id number], Rx#[id number])            Refill Questions - Documented Responses      Flowsheet Row Most Recent Value   Patient Availability and HIPAA Verification    Does patient want to proceed with activity? Yes   HIPAA/medical authority confirmed? Yes   Relationship to patient of person spoken to? Self   Refill Screening Questions    Changes to allergies? No   Changes to medications? Yes  [Patient reported she is currently taking cephalexin and hydrocodone-acetaminophen after her surgery - no DDI with Rebif]   New conditions since last clinic visit? No   Unplanned office visit, urgent care, ED, or hospital admission in the last 4 weeks? No   How does patient/caregiver feel medication is working? Good   Financial problems or insurance changes? No   How many doses of your specialty medications were missed in the last 4 weeks? 1   Why were doses missed? --  [Patient reported she skipped her dose on 4/10 per her surgeons instructions as she was having surgery on 4/11]   Would patient like to speak to a pharmacist? No   When does the patient need to receive the medication? 04/18/23   Refill Delivery Questions    How will the patient receive the medication? MEDRx   When does the patient need to receive the medication? 04/18/23   Shipping Address Home   Address in [facility] confirmed and updated if neccessary? Yes   Expected Copay ($) 0   Is the patient able to afford the medication copay? Yes   Payment Method zero copay   Days supply of Refill 28   Supplies needed? No supplies needed   Refill activity completed? Yes   Refill activity plan Refill scheduled   Shipment/Pickup Date: 04/14/23            Current Outpatient Medications   Medication Sig    B complex-C-E-Zn Tab Take 1 tablet by mouth once  daily.    cephALEXin (KEFLEX) 500 MG capsule Take 1 capsule (500 mg total) by mouth every 6 (six) hours. for 7 days    drospirenone-ethinyl estradioL (MARCE) 3-0.02 mg per tablet Take 1 tablet by mouth once daily.    HYDROcodone-acetaminophen (NORCO) 5-325 mg per tablet Take 1 tablet by mouth every 4 (four) hours as needed for Pain.    [START ON 4/14/2023] interferon beta-1a, albumin, (REBIF REBIDOSE) 44 mcg/0.5 mL PnIj Inject 0.5 mLs (44 mcg total) into the skin 3 (three) times a week.    PNV no.95/ferrous fum/folic ac (PRENATAL MULTIVITAMINS ORAL) Take by mouth.    REBIF TITRATION PACK 8.8mcg/0.2mL-22 mcg/0.5mL (6) Syrg Inject into the skin.    semaglutide, weight loss, 0.25 mg/0.5 mL PnIj Inject 0.25 mg into the skin every 7 days.    vitamin D 1000 units Tab Take 5,000 Units by mouth once daily.    Last reviewed on 4/11/2023  9:44 AM by Royer Burleson MD    Review of patient's allergies indicates:  No Known Allergies Last reviewed on  4/11/2023 2:49 PM by Estefania Suárez      Tasks added this encounter   5/9/2023 - Refill Call (Auto Added)   Tasks due within next 3 months   No tasks due.     Kristy Abrams, PharmD  [facility]  [address] [place] [id number]  Phone: [phone]  Fax: [phone]

## 2023-04-18 LAB
FINAL PATHOLOGIC DIAGNOSIS: NORMAL
Lab: NORMAL

## 2023-05-08 ENCOUNTER — HOSPITAL ENCOUNTER (OUTPATIENT)
Dept: RADIOLOGY | Facility: HOSPITAL | Age: 38
Discharge: HOME | End: 2023-05-08
Attending: PSYCHIATRY & NEUROLOGY
Payer: COMMERCIAL

## 2023-05-08 DIAGNOSIS — G35 MULTIPLE SCLEROSIS: ICD-10-CM

## 2023-05-08 PROCEDURE — 70553 MRI BRAIN STEM W/O & W/DYE: CPT | Mod: TC

## 2023-05-08 PROCEDURE — 70553 MRI BRAIN STEM W/O & W/DYE: CPT | Mod: 26,,, | Performed by: RADIOLOGY

## 2023-05-08 PROCEDURE — 25500020 PHARM REV CODE 255: Performed by: PSYCHIATRY & NEUROLOGY

## 2023-05-08 PROCEDURE — 72157 MRI THORACIC SPINE DEMYELINATING W W/O CONTRAST: ICD-10-PCS | Mod: 26,,, | Performed by: RADIOLOGY

## 2023-05-08 PROCEDURE — 72156 MRI NECK SPINE W/O & W/DYE: CPT | Mod: TC

## 2023-05-08 PROCEDURE — 72157 MRI CHEST SPINE W/O & W/DYE: CPT | Mod: TC

## 2023-05-08 PROCEDURE — 72157 MRI CHEST SPINE W/O & W/DYE: CPT | Mod: 26,,, | Performed by: RADIOLOGY

## 2023-05-08 PROCEDURE — 72156 MRI NECK SPINE W/O & W/DYE: CPT | Mod: 26,,, | Performed by: RADIOLOGY

## 2023-05-08 PROCEDURE — A9585 GADOBUTROL INJECTION: HCPCS | Performed by: PSYCHIATRY & NEUROLOGY

## 2023-05-08 PROCEDURE — 70553 MRI BRAIN DEMYELINATING W/ WO CONTRAST: ICD-10-PCS | Mod: 26,,, | Performed by: RADIOLOGY

## 2023-05-08 PROCEDURE — 72156 MRI CERVICAL SPINE DEMYELINATING W W/O CONTRAST: ICD-10-PCS | Mod: 26,,, | Performed by: RADIOLOGY

## 2023-05-08 RX ORDER — GADOBUTROL 604.72 MG/ML
6 INJECTION INTRAVENOUS
Status: COMPLETED | OUTPATIENT
Start: 2023-05-08 | End: 2023-05-08

## 2023-05-08 RX ADMIN — GADOBUTROL 6 ML: 604.72 INJECTION INTRAVENOUS at 12:05

## 2023-05-09 ENCOUNTER — PATIENT MESSAGE (OUTPATIENT)
Dept: PHARMACY | Facility: CLINIC | Age: 38
End: 2023-05-09
Payer: COMMERCIAL

## 2023-05-12 ENCOUNTER — SPECIALTY PHARMACY (OUTPATIENT)
Dept: PHARMACY | Facility: CLINIC | Age: 38
End: 2023-05-12
Payer: COMMERCIAL

## 2023-05-12 NOTE — TELEPHONE ENCOUNTER
Specialty Pharmacy - Refill Coordination    Specialty Medication Orders Linked to Encounter      Flowsheet Row Most Recent Value   Medication #1 interferon beta-1a, albumin, (REBIF REBIDOSE) 44 mcg/0.5 mL PnIj (Order#[id number], Rx#[id number])            Refill Questions - Documented Responses      Flowsheet Row Most Recent Value   Patient Availability and HIPAA Verification    Does patient want to proceed with activity? Yes   HIPAA/medical authority confirmed? Yes   Relationship to patient of person spoken to? Self   Refill Screening Questions    Changes to allergies? No   Changes to medications? No   New conditions since last clinic visit? No   Unplanned office visit, urgent care, ED, or hospital admission in the last 4 weeks? No   How does patient/caregiver feel medication is working? Good   Financial problems or insurance changes? No   How many doses of your specialty medications were missed in the last 4 weeks? 0   Would patient like to speak to a pharmacist? No   When does the patient need to receive the medication? 05/19/23   Refill Delivery Questions    How will the patient receive the medication? MEDRx   When does the patient need to receive the medication? 05/19/23   Shipping Address Home   Address in [facility] confirmed and updated if neccessary? Yes   Expected Copay ($) 0   Is the patient able to afford the medication copay? Yes   Payment Method zero copay   Days supply of Refill 28   Supplies needed? No supplies needed   Refill activity completed? Yes   Refill activity plan Refill scheduled   Shipment/Pickup Date: 05/17/23            Current Outpatient Medications   Medication Sig    B complex-C-E-Zn Tab Take 1 tablet by mouth once daily.    drospirenone-ethinyl estradioL (MARCE) 3-0.02 mg per tablet Take 1 tablet by mouth once daily.    HYDROcodone-acetaminophen (NORCO) 5-325 mg per tablet Take 1 tablet by mouth every 4 (four) hours as needed for Pain.    interferon beta-1a, albumin, (REBIF  REBIDOSE) 44 mcg/0.5 mL PnIj Inject 0.5 mLs (44 mcg total) into the skin 3 (three) times a week.    PNV no.95/ferrous fum/folic ac (PRENATAL MULTIVITAMINS ORAL) Take by mouth.    REBIF TITRATION PACK 8.8mcg/0.2mL-22 mcg/0.5mL (6) Syrg Inject into the skin.    semaglutide, weight loss, 0.25 mg/0.5 mL PnIj Inject 0.25 mg into the skin every 7 days.    vitamin D 1000 units Tab Take 5,000 Units by mouth once daily.    Last reviewed on 4/11/2023  9:44 AM by Royer Burleson MD    Review of patient's allergies indicates:  No Known Allergies Last reviewed on  5/8/2023 12:06 PM by Zeke Kitchen      Tasks added this encounter   No tasks added.   Tasks due within next 3 months   5/12/2023 - Refill Coordination Outreach (1 time occurrence)     Kristy Abrams, PharmD  [facility]  [address] [place] [id number]  Phone: [phone]  Fax: [phone]

## 2023-05-14 ENCOUNTER — PATIENT MESSAGE (OUTPATIENT)
Dept: NEUROLOGY | Facility: CLINIC | Age: 38
End: 2023-05-14
Payer: COMMERCIAL

## 2023-05-31 ENCOUNTER — OFFICE VISIT (OUTPATIENT)
Dept: NEUROLOGY | Facility: CLINIC | Age: 38
End: 2023-05-31
Payer: COMMERCIAL

## 2023-05-31 VITALS
DIASTOLIC BLOOD PRESSURE: 70 MMHG | HEIGHT: 65 IN | HEART RATE: 69 BPM | SYSTOLIC BLOOD PRESSURE: 108 MMHG | BODY MASS INDEX: 21.41 KG/M2 | WEIGHT: 128.5 LBS

## 2023-05-31 DIAGNOSIS — G35 MULTIPLE SCLEROSIS: Primary | ICD-10-CM

## 2023-05-31 DIAGNOSIS — Z71.89 COUNSELING REGARDING GOALS OF CARE: ICD-10-CM

## 2023-05-31 DIAGNOSIS — Z29.89 PROPHYLACTIC IMMUNOTHERAPY: ICD-10-CM

## 2023-05-31 PROCEDURE — 99214 OFFICE O/P EST MOD 30 MIN: CPT | Mod: S$GLB,,, | Performed by: CLINICAL NURSE SPECIALIST

## 2023-05-31 PROCEDURE — 3008F PR BODY MASS INDEX (BMI) DOCUMENTED: ICD-10-PCS | Mod: CPTII,S$GLB,, | Performed by: CLINICAL NURSE SPECIALIST

## 2023-05-31 PROCEDURE — 99999 PR PBB SHADOW E&M-EST. PATIENT-LVL III: CPT | Mod: PBBFAC,,, | Performed by: CLINICAL NURSE SPECIALIST

## 2023-05-31 PROCEDURE — 1159F MED LIST DOCD IN RCRD: CPT | Mod: CPTII,S$GLB,, | Performed by: CLINICAL NURSE SPECIALIST

## 2023-05-31 PROCEDURE — 1159F PR MEDICATION LIST DOCUMENTED IN MEDICAL RECORD: ICD-10-PCS | Mod: CPTII,S$GLB,, | Performed by: CLINICAL NURSE SPECIALIST

## 2023-05-31 PROCEDURE — 3008F BODY MASS INDEX DOCD: CPT | Mod: CPTII,S$GLB,, | Performed by: CLINICAL NURSE SPECIALIST

## 2023-05-31 PROCEDURE — 99214 PR OFFICE/OUTPT VISIT, EST, LEVL IV, 30-39 MIN: ICD-10-PCS | Mod: S$GLB,,, | Performed by: CLINICAL NURSE SPECIALIST

## 2023-05-31 PROCEDURE — 3078F PR MOST RECENT DIASTOLIC BLOOD PRESSURE < 80 MM HG: ICD-10-PCS | Mod: CPTII,S$GLB,, | Performed by: CLINICAL NURSE SPECIALIST

## 2023-05-31 PROCEDURE — 99999 PR PBB SHADOW E&M-EST. PATIENT-LVL III: ICD-10-PCS | Mod: PBBFAC,,, | Performed by: CLINICAL NURSE SPECIALIST

## 2023-05-31 PROCEDURE — 3078F DIAST BP <80 MM HG: CPT | Mod: CPTII,S$GLB,, | Performed by: CLINICAL NURSE SPECIALIST

## 2023-05-31 PROCEDURE — 3074F SYST BP LT 130 MM HG: CPT | Mod: CPTII,S$GLB,, | Performed by: CLINICAL NURSE SPECIALIST

## 2023-05-31 PROCEDURE — 3074F PR MOST RECENT SYSTOLIC BLOOD PRESSURE < 130 MM HG: ICD-10-PCS | Mod: CPTII,S$GLB,, | Performed by: CLINICAL NURSE SPECIALIST

## 2023-05-31 NOTE — PROGRESS NOTES
Subjective:          Patient ID: King Perez is a 38 y.o. female who presents today for a routine clinic visit for MS.  She was last seen in January 2023. The history has been provided by the patient.     MS HPI:  DMT: Rebif  Side effects from DMT? Avoiding her stomach because of site reactions   Taking vitamin D3 as recommended? Yes -  Dose: 10,000 units daily  She denies any recent infections.   She is exercising regularly--walking, yoga, and weights.   She has started getting ocular migraines--happened 3-4 times in the past two years. She sees strobe lights in her vision, often preceded by a headache. She recalls that these last about a day in duration.   She has only missed 1 shot of Rebif since starting last fall. She feels like she has pseudorelapses with exertion compared to the past, so she is more aware of her limits.     Medications:  Current Outpatient Medications   Medication Sig    B complex-C-E-Zn Tab Take 1 tablet by mouth once daily.    drospirenone-ethinyl estradioL (MARCE) 3-0.02 mg per tablet Take 1 tablet by mouth once daily.    interferon beta-1a, albumin, (REBIF REBIDOSE) 44 mcg/0.5 mL PnIj Inject 0.5 mLs (44 mcg total) into the skin 3 (three) times a week.    PNV no.95/ferrous fum/folic ac (PRENATAL MULTIVITAMINS ORAL) Take by mouth.    REBIF TITRATION PACK 8.8mcg/0.2mL-22 mcg/0.5mL (6) Syrg Inject into the skin.    vitamin D 1000 units Tab Take 5,000 Units by mouth once daily.     HYDROcodone-acetaminophen (NORCO) 5-325 mg per tablet Take 1 tablet by mouth every 4 (four) hours as needed for Pain. (Patient not taking: Reported on 5/31/2023)    semaglutide, weight loss, 0.25 mg/0.5 mL PnIj Inject 0.25 mg into the skin every 7 days.     SOCIAL HISTORY  Social History     Tobacco Use    Smoking status: Never    Smokeless tobacco: Never   Substance Use Topics    Alcohol use: Yes     Comment: socially    Drug use: No       Living arrangements - the patient lives with her      CECILLE:  REVIEW OF SYMPTOMS 5/30/2023   Do you feel abnormally tired on most days? No   Do you feel you generally sleep well? Yes--tends to sleep more during the summer; she thinks the Rebif may make her a lighter sleeper. She takes magnesium glycinate and melatonin as needed.    Do you have difficulty controlling your bladder?  No   Do you have difficulty controlling your bowels?  No   Do you have frequent muscle cramps, tightness or spasms in your limbs?  No   Do you have new visual symptoms?  No--just ocular migraines as above    Do you have worsening difficulty with your memory or thinking? No   Do you have worsening symptoms of anxiety or depression?  No   For patients who walk, Do you have more difficulty walking?  No   Have you fallen since your last visit?  No   For patients who use wheelchairs: Do you have any skin wounds or breakdown? No   Do you have difficulty using your hands?  No   Do you have shooting or burning pain? No   Do you have difficulty with sexual function?  No   If you are sexually active, are you using birth control? Y/N  N/A Yes   Do you often choke when swallowing liquids or solid food?  No   Do you experience worsening symptoms when overheated? Yes--tolerates heat better than cold; she knows her limits for heat    Do you need any new equipment such as a wheelchair, walker or shower chair? No   Do you receive co-pay financial assistance for your principal MS medicine? Yes   Would you be interested in participating in an MS research trial in the future? No   For patients on Gilenya, Tecfidera, Aubagio, Rituxan, Ocrevus, Tysabri, Lemtrada or Methotrexate, are you aware that you should NOT receive live virus vaccines?  Not Applicable   Do you feel you have adequate family/friend support?  Yes   Do you have health insurance?   Yes   Are you currently employed? Yes   Do you receive SSDI/SSI?  Not Applicable   Do you use marijuana or cannabis products? Yes   How often? Weekly   Have  you been diagnosed with a urinary tract infection since your last visit here? No   Have you been diagnosed with a respiratory tract infection since your last visit here? No   Have you been to the emergency room since your last visit here? No   Have you been hospitalized since your last visit here?  No            Objective:        1. 25 foot timed walk:  Timed 25 Foot Walk: 2023   Did patient wear an AFO? No No   Was assistive device used? No No   Time for 25 Foot Walk (seconds) 3.5 3.8   Time for 25 Foot Walk (seconds) 3.4 4.2       Neurologic Exam     Mental Status   Oriented to person, place, and time.   Attention: normal. Concentration: normal.   Speech: speech is normal   Level of consciousness: alert  Knowledge: good.   Normal comprehension.     Cranial Nerves     CN II   Visual acuity: (20/20 OD, 20/20 OS with contacts)    CN III, IV, VI   Pupils are equal, round, and reactive to light.  Extraocular motions are normal.     CN V   Right facial sensation deficit: none  Left facial sensation deficit: none    CN VII   Right facial weakness: none  Left facial weakness: none    CN VIII   Hearing: intact    CN IX, X   Palate: symmetric    CN XI   Right sternocleidomastoid strength: normal  Left sternocleidomastoid strength: normal  Right trapezius strength: normal  Left trapezius strength: normal    CN XII   Tongue deviation: none    Motor Exam     Strength   Right neck flexion: 5/5  Left neck flexion: 5/5  Right neck extension: 5/5  Left neck extension: 5/5  Right deltoid: 5/5  Left deltoid: 5/5  Right biceps: 5/5  Left biceps: 5/5  Right triceps: 5/5  Left triceps: 5/5  Right wrist flexion: 5/5  Left wrist flexion: 5/5  Right wrist extension: 5/5  Left wrist extension: 5/5  Right interossei: 5/5  Left interossei: 5/5  Right iliopsoas: 5/5  Left iliopsoas: 5/5  Right quadriceps: 5/5  Left quadriceps: 5/5  Right hamstrin/5  Left hamstrin/5  Right anterior tibial: 5/5  Left anterior tibial:  5/5  Right gastroc: 5/5  Left gastroc: 5/5    Sensory Exam   Right arm vibration: normal  Left arm vibration: normal  Right leg vibration: normal  Left leg vibration: normal    Gait, Coordination, and Reflexes     Gait  Gait: normal    Coordination   Romberg: negative  Finger to nose coordination: normal  Heel to shin coordination: normal  Tandem walking coordination: normal    Reflexes   Right brachioradialis: 1+  Left brachioradialis: 1+  Right biceps: 1+  Left biceps: 1+  Right triceps: 1+  Left triceps: 1+  Right patellar: 1+  Left patellar: 1+  Right achilles: 1+  Left achilles: 1+  Right plantar: normal  Left plantar: normal  Normal rapid sequential movements in upper extremities.   She can walk on toes and heels and hop on each foot ten times.        Imaging:       Results for orders placed during the hospital encounter of 05/08/23    MRI Brain Demyelinating W W/O Contrast    Impression  No significant change from prior.  Essentially stable T2 FLAIR lesion burden throughout the brain parenchyma in light of history remains concerning for mild degree of prior demyelinating plaque    No new lesion or enhancing lesion to suggest significant interval or active demyelination.    Clinical correlation and continued follow-up advised.      Electronically signed by: Dennis Palmer DO  Date:    05/08/2023  Time:    12:49    Results for orders placed during the hospital encounter of 05/08/23    MRI Cervical Spine Demyelinating W W/O Contrast    Impression  Continued short segment regions of T2 stir signal abnormality in the cervical and thoracic cord as detailed above most compatible with prior areas of demyelination in light of history. No new cord signal abnormality or abnormal intrathecal enhancement to suggest significant interval or active demyelination.    Clinical correlation and continued follow-up advised.      Electronically signed by: Dennis Palmer DO  Date:    05/08/2023  Time:    14:00    Results for orders  placed during the hospital encounter of 05/08/23    MRI Thoracic Spine Demyelinating W W/O Contrast    Impression  Continued short segment regions of T2 stir signal abnormality in the cervical and thoracic cord as detailed above most compatible with prior areas of demyelination in light of history. No new cord signal abnormality or abnormal intrathecal enhancement to suggest significant interval or active demyelination.    Clinical correlation and continued follow-up advised.      Electronically signed by: Dennis Palmer DO  Date:    05/08/2023  Time:    14:00      Images were reviewed with the patient.   Labs:     Lab Results   Component Value Date    HCTDNRJR14HM 67 11/14/2022    FQHMVZDP70VM 81 10/25/2021    RLXMZBYV76CM 59 10/21/2020     Lab Results   Component Value Date    JCVINDEX 1.52 (A) 02/06/2020    JCVANTIBODY Positive (A) 02/06/2020     Lab Results   Component Value Date    DH4VCXEB 73.8 02/06/2020    ABSOLUTECD3 1007.0 02/06/2020    MC0MQFAQ 17.1 02/06/2020    ABSOLUTECD8 234.0 02/06/2020    NY7FZYCP 55.7 02/06/2020    ABSOLUTECD4 761.0 02/06/2020    LABCD48 3.25 02/06/2020     Lab Results   Component Value Date    WBC 4.87 04/03/2023    RBC 3.64 (L) 04/03/2023    HGB 12.3 04/03/2023    HCT 35.4 (L) 04/03/2023    MCV 97 04/03/2023    MCH 33.8 (H) 04/03/2023    MCHC 34.7 04/03/2023    RDW 11.9 04/03/2023     04/03/2023    MPV 10.0 04/03/2023    GRAN 2.1 04/03/2023    GRAN 43.4 04/03/2023    LYMPH 2.3 04/03/2023    LYMPH 47.6 04/03/2023    MONO 0.3 04/03/2023    MONO 5.5 04/03/2023    EOS 0.1 04/03/2023    BASO 0.02 04/03/2023    EOSINOPHIL 2.9 04/03/2023    BASOPHIL 0.4 04/03/2023     Sodium   Date Value Ref Range Status   11/14/2022 136 136 - 145 mmol/L Final     Potassium   Date Value Ref Range Status   11/14/2022 3.8 3.5 - 5.1 mmol/L Final     Chloride   Date Value Ref Range Status   11/14/2022 105 95 - 110 mmol/L Final     CO2   Date Value Ref Range Status   11/14/2022 24 23 - 29 mmol/L Final      Glucose   Date Value Ref Range Status   11/14/2022 89 70 - 110 mg/dL Final     BUN   Date Value Ref Range Status   11/14/2022 6 6 - 20 mg/dL Final     Creatinine   Date Value Ref Range Status   11/14/2022 0.7 0.5 - 1.4 mg/dL Final     Calcium   Date Value Ref Range Status   11/14/2022 9.4 8.7 - 10.5 mg/dL Final     Total Protein   Date Value Ref Range Status   04/03/2023 7.2 6.0 - 8.4 g/dL Final     Albumin   Date Value Ref Range Status   04/03/2023 3.8 3.5 - 5.2 g/dL Final     Total Bilirubin   Date Value Ref Range Status   04/03/2023 0.4 0.1 - 1.0 mg/dL Final     Comment:     For infants and newborns, interpretation of results should be based  on gestational age, weight and in agreement with clinical  observations.    Premature Infant recommended reference ranges:  Up to 24 hours.............<8.0 mg/dL  Up to 48 hours............<12.0 mg/dL  3-5 days..................<15.0 mg/dL  6-29 days.................<15.0 mg/dL       Alkaline Phosphatase   Date Value Ref Range Status   04/03/2023 43 (L) 55 - 135 U/L Final     AST   Date Value Ref Range Status   04/03/2023 16 10 - 40 U/L Final     ALT   Date Value Ref Range Status   04/03/2023 12 10 - 44 U/L Final     Anion Gap   Date Value Ref Range Status   11/14/2022 7 (L) 8 - 16 mmol/L Final     eGFR if    Date Value Ref Range Status   06/23/2022 >60.0 >60 mL/min/1.73 m^2 Final     eGFR if non    Date Value Ref Range Status   06/23/2022 >60.0 >60 mL/min/1.73 m^2 Final     Comment:     Calculation used to obtain the estimated glomerular filtration  rate (eGFR) is the CKD-EPI equation.        Lab Results   Component Value Date    HEPBSAG Negative 04/18/2022    HEPBSAB Positive 04/18/2022    HEPBCAB Negative 04/18/2022           MS Impression and Plan:     NEURO MULTIPLE SCLEROSIS IMPRESSION:   MS Status:     Number of relapses in the past year?:  0    Clinical Progression:  Clinically Stable    MRI Progression:  Stable  Plan:     DMT:  No  change in management    DMT comment:  Continue Rebif and Vitamin D. Rebif safety labs are planned for October.     Symptom Management:  No change in symptom management       If ocular migraines become more frequent, we will plan to refer her to a colleague in the headache clinic.   She will follow up with Dr. Hernandez in 6 months.     Total time spent with patient: 27 minutes   Total time spent on encounter: 39 minutes       Problem List Items Addressed This Visit          Neurologic Problems    Multiple sclerosis - Primary    Relevant Orders    Hepatic function panel    CBC auto differential         RADHA Monzon, CNS

## 2023-06-06 ENCOUNTER — OFFICE VISIT (OUTPATIENT)
Dept: OBSTETRICS AND GYNECOLOGY | Facility: CLINIC | Age: 38
End: 2023-06-06
Payer: COMMERCIAL

## 2023-06-06 VITALS — BODY MASS INDEX: 21.02 KG/M2 | DIASTOLIC BLOOD PRESSURE: 74 MMHG | SYSTOLIC BLOOD PRESSURE: 99 MMHG | WEIGHT: 126.31 LBS

## 2023-06-06 DIAGNOSIS — Z30.41 ENCOUNTER FOR SURVEILLANCE OF CONTRACEPTIVE PILLS: ICD-10-CM

## 2023-06-06 DIAGNOSIS — Z01.419 WELL WOMAN EXAM WITH ROUTINE GYNECOLOGICAL EXAM: Primary | ICD-10-CM

## 2023-06-06 DIAGNOSIS — Z12.31 VISIT FOR SCREENING MAMMOGRAM: ICD-10-CM

## 2023-06-06 PROCEDURE — 99396 PR PREVENTIVE VISIT,EST,40-64: ICD-10-PCS | Mod: S$GLB,,, | Performed by: NURSE PRACTITIONER

## 2023-06-06 PROCEDURE — 1159F PR MEDICATION LIST DOCUMENTED IN MEDICAL RECORD: ICD-10-PCS | Mod: CPTII,S$GLB,, | Performed by: NURSE PRACTITIONER

## 2023-06-06 PROCEDURE — 3074F PR MOST RECENT SYSTOLIC BLOOD PRESSURE < 130 MM HG: ICD-10-PCS | Mod: CPTII,S$GLB,, | Performed by: NURSE PRACTITIONER

## 2023-06-06 PROCEDURE — 99999 PR PBB SHADOW E&M-EST. PATIENT-LVL III: ICD-10-PCS | Mod: PBBFAC,,, | Performed by: NURSE PRACTITIONER

## 2023-06-06 PROCEDURE — 99999 PR PBB SHADOW E&M-EST. PATIENT-LVL III: CPT | Mod: PBBFAC,,, | Performed by: NURSE PRACTITIONER

## 2023-06-06 PROCEDURE — 1159F MED LIST DOCD IN RCRD: CPT | Mod: CPTII,S$GLB,, | Performed by: NURSE PRACTITIONER

## 2023-06-06 PROCEDURE — 3074F SYST BP LT 130 MM HG: CPT | Mod: CPTII,S$GLB,, | Performed by: NURSE PRACTITIONER

## 2023-06-06 PROCEDURE — 3078F DIAST BP <80 MM HG: CPT | Mod: CPTII,S$GLB,, | Performed by: NURSE PRACTITIONER

## 2023-06-06 PROCEDURE — 99396 PREV VISIT EST AGE 40-64: CPT | Mod: S$GLB,,, | Performed by: NURSE PRACTITIONER

## 2023-06-06 PROCEDURE — 3008F BODY MASS INDEX DOCD: CPT | Mod: CPTII,S$GLB,, | Performed by: NURSE PRACTITIONER

## 2023-06-06 PROCEDURE — 3008F PR BODY MASS INDEX (BMI) DOCUMENTED: ICD-10-PCS | Mod: CPTII,S$GLB,, | Performed by: NURSE PRACTITIONER

## 2023-06-06 PROCEDURE — 3078F PR MOST RECENT DIASTOLIC BLOOD PRESSURE < 80 MM HG: ICD-10-PCS | Mod: CPTII,S$GLB,, | Performed by: NURSE PRACTITIONER

## 2023-06-06 RX ORDER — DROSPIRENONE AND ETHINYL ESTRADIOL 0.02-3(28)
1 KIT ORAL DAILY
Qty: 84 TABLET | Refills: 3 | Status: SHIPPED | OUTPATIENT
Start: 2023-06-06 | End: 2024-03-24 | Stop reason: SDUPTHER

## 2023-06-06 NOTE — PROGRESS NOTES
CC: Annual  HPI: Pt is a 38 y.o.  female who presents for routine annual exam. She uses ocps for contraception. She does not want STD screening. Recent breast lift and reduction- happy with results, healing well. Needs mammogram this year. Continued with ocps- changed MS meds and has moved on from [facility].     Notes from last visit with me in   CC: Annual  HPI: Pt is a 37 y.o.  female who presents for routine annual exam. She uses ocps for contraception. She does not want STD screening. Planning to [facility] in early May after her next infusion. Plans to continue ocps until then and has a 3 month window she will try for. Curious what her period will do- currently does not get one with pills since she skips placebo pills. On ocps since the age of 16. Already taking a prenatal vitamin.     FH:   Breast cancer: paternal grandmother-dx in her 60s  Colon cancer: none  Ovarian cancer: none  Uterine cancer: none  HPV vaccine: no    Last pap smear:  nilm, hpv negative  History of abnormal pap smears: no  Colonoscopy: na  DEXA: na  Mammogram: na  STD history: no  Birth control: ocp  OB history:   Tobacco use: no     ROS:  GENERAL: Feeling well overall. Denies fever or chills.   SKIN: Denies rash or lesions.   HEAD: Denies head injury or headache.   NODES: Denies enlarged lymph nodes.   CHEST: Denies chest pain or shortness of breath.   CARDIOVASCULAR: Denies palpitations or left sided chest pain.   ABDOMEN: No abdominal pain, constipation, diarrhea, nausea, vomiting or rectal bleeding.   URINARY: No dysuria, hematuria, or burning on urination.  REPRODUCTIVE: See HPI.   BREASTS: Denies pain, lumps, or nipple discharge.   HEMATOLOGIC: No easy bruisability or excessive bleeding.   MUSCULOSKELETAL: Denies joint pain or swelling.   NEUROLOGIC: Denies syncope or weakness.   PSYCHIATRIC: Denies depression, anxiety or mood swings.    PE:   APPEARANCE: Well nourished, well developed, White female in no acute distress.  NODES: no  cervical, supraclavicular, or inguinal lymphadenopathy  BREASTS: Symmetrical, no skin changes or visible lesions. No palpable masses, nipple discharge or adenopathy bilaterally.  ABDOMEN: Soft. No tenderness or masses. No distention. No hernias palpated. No CVA tenderness.  VULVA: No lesions. Normal external female genitalia.  URETHRAL MEATUS: Normal size and location, no lesions, no prolapse.  URETHRA: No masses, tenderness, or prolapse.  VAGINA: Moist. No lesions or lacerations noted. No abnormal discharge present. No odor present.   CERVIX: No lesions or discharge. No cervical motion tenderness.   UTERUS: Normal size, regular shape, mobile, non-tender.  ADNEXA: No tenderness. No fullness or masses palpated in the adnexal regions.   ANUS PERINEUM: Normal.      Diagnosis:  1. Well woman exam with routine gynecological exam    2. Visit for screening mammogram    3. Encounter for surveillance of contraceptive pills        Plan:     Orders Placed This Encounter    Mammo Digital Screening Bilat    drospirenone-ethinyl estradioL (MARCE) 3-0.02 mg per tablet       Patient was counseled today on the new ACS guidelines for cervical cytology screening as well as the current recommendations for breast cancer screening. She was counseled to follow up with her PCP for other routine health maintenance. Counseling session lasted approximately 10 minutes, and all her questions were answered.  For women over the age of 65, you can stop having cervical cancer screenings if you have never had abnormal cervical cells or cervical cancer, and youve had three negative Pap tests in a row. (You also can stop screening if youve had two negative Pap and HPV tests in a row in the past 10 years, with at least one test in the past 5 years.),    Follow-up with me in 1 year for routine exam; pap in 2 years.     I spent a total of 20 minutes on the day of the visit.This includes face to face time and non-face to face time preparing to see the  patient (eg, review of tests), obtaining and/or reviewing separately obtained history, documenting clinical information in the electronic or other health record, independently interpreting results and communicating results to the patient/family/caregiver, or care coordinator.    As of April 1, 2021, the Cures Act has been passed nationally. This new law requires that all doctors progress notes, lab results, pathology reports and radiology reports be released IMMEDIATELY to the patient in the patient portal. That means that the results are released to you at the EXACT same time they are released to me. Therefore, with all of the patients that I have I am not able to reply to each patient exactly when the results come in. So there will be a delay from when you see the results to when I see them and have time to come up with a response to send you. Also I only see these results when I am on the computer at work. So if the results come in over the weekend or after 5 pm of a work day, I will not see them until the next business day. As you can tell, this is a challenge as a provider to give every patient the quick response they hope for and deserve. So please be patient!     Thanks for your understanding and patience.

## 2023-06-07 ENCOUNTER — PATIENT MESSAGE (OUTPATIENT)
Dept: PHARMACY | Facility: CLINIC | Age: 38
End: 2023-06-07
Payer: COMMERCIAL

## 2023-06-14 ENCOUNTER — SPECIALTY PHARMACY (OUTPATIENT)
Dept: PHARMACY | Facility: CLINIC | Age: 38
End: 2023-06-14
Payer: COMMERCIAL

## 2023-06-14 NOTE — TELEPHONE ENCOUNTER
Incoming call from pt regarding Rebif Rebidose refill. Pt reported 4 pens on hand. Ok to call back on 6/19 for refill.

## 2023-06-19 NOTE — TELEPHONE ENCOUNTER
Specialty Pharmacy - Refill Coordination    Specialty Medication Orders Linked to Encounter      Flowsheet Row Most Recent Value   Medication #1 interferon beta-1a, albumin, (REBIF REBIDOSE) 44 mcg/0.5 mL PnIj (Order#[id number], Rx#[id number])            Refill Questions - Documented Responses      Flowsheet Row Most Recent Value   Patient Availability and HIPAA Verification    Does patient want to proceed with activity? Yes   HIPAA/medical authority confirmed? Yes   Relationship to patient of person spoken to? Self   Refill Screening Questions    Changes to allergies? No   Changes to medications? No   New conditions since last clinic visit? No   Unplanned office visit, urgent care, ED, or hospital admission in the last 4 weeks? No   How does patient/caregiver feel medication is working? Good   Financial problems or insurance changes? No   How many doses of your specialty medications were missed in the last 4 weeks? 0   Would patient like to speak to a pharmacist? No   When does the patient need to receive the medication? 06/26/23   Refill Delivery Questions    How will the patient receive the medication? [facility]   When does the patient need to receive the medication? 06/26/23   Shipping Address Home   Address in [facility] confirmed and updated if neccessary? Yes   Expected Copay ($) 0   Is the patient able to afford the medication copay? Yes   Payment Method zero copay   Days supply of Refill 28   Supplies needed? No supplies needed   Refill activity completed? Yes   Refill activity plan Refill scheduled   Shipment/Pickup Date: 06/22/23            Current Outpatient Medications   Medication Sig    B complex-C-E-Zn Tab Take 1 tablet by mouth once daily.    drospirenone-ethinyl estradioL (MARCE) 3-0.02 mg per tablet Take 1 tablet by mouth once daily.    HYDROcodone-acetaminophen (NORCO) 5-325 mg per tablet Take 1 tablet by mouth every 4 (four) hours as needed for Pain. (Patient not taking: Reported on 5/31/2023)     interferon beta-1a, albumin, (REBIF REBIDOSE) 44 mcg/0.5 mL PnIj Inject 0.5 mLs (44 mcg total) into the skin 3 (three) times a week.    PNV no.95/ferrous fum/folic ac (PRENATAL MULTIVITAMINS ORAL) Take by mouth.    semaglutide, weight loss, 0.25 mg/0.5 mL PnIj Inject 0.25 mg into the skin every 7 days.    vitamin D 1000 units Tab Take 5,000 Units by mouth once daily.    Last reviewed on 6/6/2023  2:15 PM by Genny James MA    Review of patient's allergies indicates:  No Known Allergies Last reviewed on  6/6/2023 2:17 PM by Genny James      Tasks added this encounter   No tasks added.   Tasks due within next 3 months   8/18/2023 - Clinical Assessment (1 year recurrence)  6/19/2023 - Refill Coordination Outreach (1 time occurrence)     Kristy Abrams, PharmD  [facility]  [facility] [address]  [facility] [address]  Phone: [phone]  Fax: [phone]

## 2023-06-22 ENCOUNTER — TELEPHONE (OUTPATIENT)
Dept: PHARMACY | Facility: CLINIC | Age: 38
End: 2023-06-22
Payer: COMMERCIAL

## 2023-06-22 NOTE — TELEPHONE ENCOUNTER
Patient called to report 2 misfires of her Rebif Rebidose 44mcg. Stated that when she injected, the plunger only went down a little and the medicine was just dripping out. Contacted [employer] to report product complaint. Spoke to rep Gann. If patient wants a replacement, she can call [facility] [employer] at [phone].    Called patient and provided number to [facility] [employer].

## 2023-07-13 ENCOUNTER — PATIENT MESSAGE (OUTPATIENT)
Dept: PHARMACY | Facility: CLINIC | Age: 38
End: 2023-07-13
Payer: COMMERCIAL

## 2023-07-13 ENCOUNTER — SPECIALTY PHARMACY (OUTPATIENT)
Dept: PHARMACY | Facility: CLINIC | Age: 38
End: 2023-07-13
Payer: COMMERCIAL

## 2023-07-13 DIAGNOSIS — G35 MULTIPLE SCLEROSIS: Primary | ICD-10-CM

## 2023-07-13 NOTE — TELEPHONE ENCOUNTER
Incoming call from patient requesting a refill for Rebif. Notified pt that a refill request has been sent to [facility] and [facility] will f/u once the refills are received. Pt has 3 doses currently on hand.

## 2023-07-17 RX ORDER — INTERFERON BETA-1A 44 UG/.5ML
44 INJECTION, SOLUTION SUBCUTANEOUS
Qty: 36 PEN | Refills: 0 | Status: ACTIVE | OUTPATIENT
Start: 2023-07-17 | End: 2023-10-09 | Stop reason: SDUPTHER

## 2023-07-17 NOTE — TELEPHONE ENCOUNTER
Specialty Pharmacy - Refill Coordination    Specialty Medication Orders Linked to Encounter      Flowsheet Row Most Recent Value   Medication #1 interferon beta-1a, albumin, (REBIF REBIDOSE) 44 mcg/0.5 mL PnIj (Order#[id number], Rx#[id number])            Refill Questions - Documented Responses      Flowsheet Row Most Recent Value   Patient Availability and HIPAA Verification    Does patient want to proceed with activity? Yes   HIPAA/medical authority confirmed? Yes   Relationship to patient of person spoken to? Self   Refill Screening Questions    Changes to allergies? No   Changes to medications? No   New conditions since last clinic visit? No   Unplanned office visit, urgent care, ED, or hospital admission in the last 4 weeks? No   How does patient/caregiver feel medication is working? Good   Financial problems or insurance changes? No   How many doses of your specialty medications were missed in the last 4 weeks? 0   Would patient like to speak to a pharmacist? No   When does the patient need to receive the medication? 07/21/23  [Has doses for today, wednesday, but will need med for Friday]   Refill Delivery Questions    How will the patient receive the medication? MEDRx   When does the patient need to receive the medication? 07/21/23  [Has doses for today, wednesday, but will need med for Friday]   Shipping Address Home   Address in [facility] confirmed and updated if neccessary? Yes   Expected Copay ($) 0   Is the patient able to afford the medication copay? Yes   Payment Method zero copay   Days supply of Refill 28   Supplies needed? No supplies needed   Refill activity completed? Yes   Refill activity plan Refill scheduled   Shipment/Pickup Date: 07/19/23            Current Outpatient Medications   Medication Sig    B complex-C-E-Zn Tab Take 1 tablet by mouth once daily.    drospirenone-ethinyl estradioL (MARCE) 3-0.02 mg per tablet Take 1 tablet by mouth once daily.    HYDROcodone-acetaminophen (NORCO)  5-325 mg per tablet Take 1 tablet by mouth every 4 (four) hours as needed for Pain. (Patient not taking: Reported on 5/31/2023)    interferon beta-1a, albumin, (REBIF REBIDOSE) 44 mcg/0.5 mL PnIj Inject 0.5 mLs (44 mcg total) into the skin 3 (three) times a week.    PNV no.95/ferrous fum/folic ac (PRENATAL MULTIVITAMINS ORAL) Take by mouth.    semaglutide, weight loss, 0.25 mg/0.5 mL PnIj Inject 0.25 mg into the skin every 7 days.    vitamin D 1000 units Tab Take 5,000 Units by mouth once daily.    Last reviewed on 6/6/2023  2:15 PM by Genny James MA    Review of patient's allergies indicates:  No Known Allergies Last reviewed on  6/6/2023 2:17 PM by Genny James      Tasks added this encounter   No tasks added.   Tasks due within next 3 months   8/18/2023 - Clinical Assessment (1 year recurrence)     Tamara Mcmanus, PharmD  [facility]  [address] [place], [place] [address]  [facility] [address]  Phone: [phone]  Fax: [phone]

## 2023-07-19 ENCOUNTER — PATIENT MESSAGE (OUTPATIENT)
Dept: NEUROLOGY | Facility: CLINIC | Age: 38
End: 2023-07-19
Payer: COMMERCIAL

## 2023-07-19 DIAGNOSIS — G43.909 MIGRAINE WITHOUT STATUS MIGRAINOSUS, NOT INTRACTABLE, UNSPECIFIED MIGRAINE TYPE: Primary | ICD-10-CM

## 2023-07-21 ENCOUNTER — PATIENT MESSAGE (OUTPATIENT)
Dept: PSYCHIATRY | Facility: CLINIC | Age: 38
End: 2023-07-21
Payer: COMMERCIAL

## 2023-07-23 ENCOUNTER — PATIENT MESSAGE (OUTPATIENT)
Dept: NEUROLOGY | Facility: CLINIC | Age: 38
End: 2023-07-23
Payer: COMMERCIAL

## 2023-07-28 ENCOUNTER — OFFICE VISIT (OUTPATIENT)
Dept: NEUROLOGY | Facility: CLINIC | Age: 38
End: 2023-07-28
Payer: COMMERCIAL

## 2023-07-28 DIAGNOSIS — G35 MULTIPLE SCLEROSIS: ICD-10-CM

## 2023-07-28 DIAGNOSIS — G43.109 MIGRAINE WITH AURA AND WITHOUT STATUS MIGRAINOSUS, NOT INTRACTABLE: Primary | ICD-10-CM

## 2023-07-28 DIAGNOSIS — G44.209 TENSION HEADACHE: ICD-10-CM

## 2023-07-28 PROCEDURE — 99214 OFFICE O/P EST MOD 30 MIN: CPT | Mod: 95,,, | Performed by: PHYSICIAN ASSISTANT

## 2023-07-28 PROCEDURE — 1159F PR MEDICATION LIST DOCUMENTED IN MEDICAL RECORD: ICD-10-PCS | Mod: CPTII,95,, | Performed by: PHYSICIAN ASSISTANT

## 2023-07-28 PROCEDURE — 1159F MED LIST DOCD IN RCRD: CPT | Mod: CPTII,95,, | Performed by: PHYSICIAN ASSISTANT

## 2023-07-28 PROCEDURE — 1160F PR REVIEW ALL MEDS BY PRESCRIBER/CLIN PHARMACIST DOCUMENTED: ICD-10-PCS | Mod: CPTII,95,, | Performed by: PHYSICIAN ASSISTANT

## 2023-07-28 PROCEDURE — 1160F RVW MEDS BY RX/DR IN RCRD: CPT | Mod: CPTII,95,, | Performed by: PHYSICIAN ASSISTANT

## 2023-07-28 PROCEDURE — 99214 PR OFFICE/OUTPT VISIT, EST, LEVL IV, 30-39 MIN: ICD-10-PCS | Mod: 95,,, | Performed by: PHYSICIAN ASSISTANT

## 2023-07-28 RX ORDER — RIZATRIPTAN BENZOATE 5 MG/1
TABLET, ORALLY DISINTEGRATING ORAL
Qty: 12 TABLET | Refills: 3 | Status: SHIPPED | OUTPATIENT
Start: 2023-07-28

## 2023-07-28 NOTE — PROGRESS NOTES
"New Patient     The patient location is: [place]  The chief complaint leading to consultation is: headache     Visit type: audiovisual    Face to Face time with patient: 22 min  30 minutes of total time spent on the encounter, which includes face to face time and non-face to face time preparing to see the patient (eg, review of tests), Obtaining and/or reviewing separately obtained history, Documenting clinical information in the electronic or other health record, Independently interpreting results (not separately reported) and communicating results to the patient/family/caregiver, or Care coordination (not separately reported).     Each patient to whom he or she provides medical services by telemedicine is:  (1) informed of the relationship between the physician and patient and the respective role of any other health care provider with respect to management of the patient; and (2) notified that he or she may decline to receive medical services by telemedicine and may withdraw from such care at any time.    Notes:       SUBJECTIVE:  Patient ID: King Perez   MRN: [id number]  Referred By: Dr. Marci Hernandez  Chief Complaint: Headache      History of Present Illness:   38 y.o. female with ha's, MS, who presents to virtual visit alone for evaluation of headaches.   PMHx negative for TBI, Meningitis, Aneurysms, Kidney Stones, asthma, GI bleed, osteoporosis, CAD/MI, CVA/TIA, DM, cancer  Family Hx negative for Migraines    Pt has a h/o ha's for > 2 yrs with visual auras ("flashing in my eyes", "strobe lights") that worsened in May. 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 She has maintained her annual eye appointments w/ no abnormalities noted. She also has MS and was referred to HA clinic to further assist pt w/ her ha's. She further describes her ha's as follows: " "  Location/Radiation - unsure, believes it is holocephalic or frontalis  Quality - achy, sharp, throbbing  Duration - hours - 1 day  Intensity (range) - mild - severe  Frequency - 2/30 ha days per month  Triggers - sinuses, strong smell, change in diet  Associated symptoms with the headache: soreness "in my face", dizziness, photo/phonophobia    Treatments Tried   Mag  Ubrelvy - tried once, helped  Ibuprofen 800mg - somewhat helps    Current Medications:    Current Outpatient Medications:     B complex-C-E-Zn Tab, Take 1 tablet by mouth once daily., Disp: , Rfl:     drospirenone-ethinyl estradioL (MARCE) 3-0.02 mg per tablet, Take 1 tablet by mouth once daily., Disp: 84 tablet, Rfl: 3    HYDROcodone-acetaminophen (NORCO) 5-325 mg per tablet, Take 1 tablet by mouth every 4 (four) hours as needed for Pain. (Patient not taking: Reported on 5/31/2023), Disp: 30 tablet, Rfl: 0    interferon beta-1a, albumin, (REBIF REBIDOSE) 44 mcg/0.5 mL PnIj, Inject 0.5 mLs (44 mcg total) into the skin 3 (three) times a week., Disp: 36 pen , Rfl: 0    PNV no.95/ferrous fum/folic ac (PRENATAL MULTIVITAMINS ORAL), Take by mouth., Disp: , Rfl:     rizatriptan (MAXALT-MLT) 5 MG disintegrating tablet, Take at onset of migraine, can repeat in 2 hrs if needed.  No more than 2 tabs per day or 3 days/wk., Disp: 12 tablet, Rfl: 3    semaglutide, weight loss, 0.25 mg/0.5 mL PnIj, Inject 0.25 mg into the skin every 7 days., Disp: , Rfl:     vitamin D 1000 units Tab, Take 5,000 Units by mouth once daily. , Disp: , Rfl:     Review of Systems - as per HPI, otherwise a balanced 10 systems review is negative.    OBJECTIVE:  Vitals:  There were no vitals taken for this visit.    Physical Exam: certain limitations due to video visit platform, able to perform the following with the patient's assistance:  Constitutional: she appears well-developed and well-nourished. she is well groomed. NAD   HENT:    Head: Normocephalic and atraumatic  Eyes: Conjunctivae " and EOM are normal  Musculoskeletal: Normal range of motion. No joint stiffness.   Psychiatric: Mood and affect are normal    Neuro: Patient is alert and oriented to person, place, and time. Language is intact and fluent. Speech is clear and fluent. Recent and remote memory are intact.  Normal attention and concentration.  Facial movement is symmetric. Moves all 4 extremities against gravity.    Review of Data:   Notes from neuro reviewed   Labs:  No visits with results within 3 Month(s) from this visit.   Latest known visit with results is:   Admission on 04/11/2023, Discharged on 04/11/2023   Component Date Value Ref Range Status    POC Preg Test, Ur 04/11/2023 Negative  Negative Final     Acceptable 04/11/2023 Yes   Final    Final Pathologic Diagnosis 04/11/2023    Final                    [facility]:  A.   RIGHT BREAST SKIN AND TISSUE, MAMMOPLASTY (101 GRAMS):         - Benign skin and underlying fibrofatty breast parenchyma.         - No atypia, in situ or invasive malignancy identified.    B.   LEFT BREAST SKIN AND TISSUE, MAMMOPLASTY (135 GRAMS):         - Benign skin and underlying fibrofatty breast parenchyma showing mild chronic inflammation and apocrine metaplasia.         - No atypia, in situ or invasive malignancy identified.      Luiza Butler M.D.     Report attached.    Performing site:  [address] [address]    &quot;Disclaimer:  This case diagnosis was rendered completely by the outside consultation pathologist and the case is electronically signed by an [facility] pathologist listed below solely to release the report into the medical record.&quot;      Disclaimer 04/11/2023 Unless the case is a 'gross only' or additional testing only, the final diagnosis for each specimen is based on a microscopic examination of appropriate tissue sections.   Final     Imaging:  Results for orders placed or performed during the hospital encounter of  03/05/18   MRI Brain W WO Contrast    Narrative    Procedure: MRI the brain with andwithout contrast.    Technique: 3-D space flair reformatted in 3 planes, axial T1, axial gradient and axial diffusion imaging of the whole brain pre-contrast.  Post contrast axial T2, axial T1, axial T2 flair and axial T1 and axial spoiled gradient imaging of the whole brain reformatted in coronal and sagittal planes. 7  ml of Gadavist injected intravenously.         Comparison: 11/07/2016    Findings: Continued a few scattered punctate size foci of T2 and flair signal hyperintensity in the supratentorial parenchyma without corresponding diffusion signal abnormality or enhancement. A few foci extend to the margin of the body of the corpus callosum with punctate lesion within the mid undersurface of the corpus callosum. While nonspecific these remain concerning for mild degree of prior demyelinating plaque burden. Allowing for differences in technique there is no definite new lesion or enhancing lesion to suggest significant interval or active demyelination. No abnormal parenchymal susceptibility to suggest parenchymal hemorrhage. The major intracranial T2 flow voids are present. There is trace mucosal thickening in the ethmoid air cells.    Impression    Continued scattered punctate foci of T2 flair signal hyperintensity supratentorial parenchyma allowing for differences in technique there is no significant new lesion.    No evidence for enhancing lesion or diffusion signal abnormality with configuration overall suggestive for mild degree of prior demyelinating plaque burden. Clinical correlation and continued followup advised       Electronically signed by: JEREMÍAS TRAMMELL DO  Date:     03/05/18  Time:    11:24      Note: I have independently reviewed any/all imaging/labs/tests and agree with the report (s) as documented.  Any discrepancies will be as noted/demarcated by free text.  ALIX DHILLON 7/28/2023    ASSESSMENT:  1. Migraine with  aura and without status migrainosus, not intractable    2. Multiple sclerosis    3. Tension headache          PLAN:  - Discussed symptoms appear to be consistent with migraines and tension ha's. Discussed this with patient along with treatment options and patient agreed with the following plan  - abortive - try maxalt  - MS - continue to follow w/ ms clinic  - risks, benefits, and potential side effects of maxalt discussed   - alternative treatment options offered   - importance of healthy diet, regular exercise and sleep hygiene in the treatment of headaches    - Start tracking headaches via Migraine Buddy unique on phone   - RTC in 2-3 mo or sooner if needed     Orders Placed This Encounter    rizatriptan (MAXALT-MLT) 5 MG disintegrating tablet       I have discussed realistic goals of care with patient at length as well as medication options, and need for lifestyle adjustment. I have explained that treatment will take time. We have agreed that the goal will be to reduce frequency/intensity/quantity of HA, not to be completely HA free. I have explained my non narcotic policy regarding headache treatment.    Patient agreeable to work on lifestyle adjustments.    Discussed potential for teratogenicity with treatment, patient understands if her family planning status should change she will contact office immediately and we will safely adjust medications as needed.     Questions and concerns were sought and answered to the patient's stated verbal satisfaction.  The patient verbalizes understanding and agreement with the above stated treatment plan.     CC: Primary Doctor Vannessa Parson PA-C  [facility]  [phone]    Dr. Frey was available during today's encounter.

## 2023-07-28 NOTE — PATIENT INSTRUCTIONS
Supplements for Migraine:  1. Magnesium Oxide - 400mg by mouth daily  2. Riboflavin (Vitamin B2) - 400mg by mouth daily  3. Coenzyme Q10 - 200mg tablet by mouth daily    - Please download Migraine Hernandez unique on phone and begin tracking your headaches

## 2023-08-09 ENCOUNTER — PATIENT MESSAGE (OUTPATIENT)
Dept: PHARMACY | Facility: CLINIC | Age: 38
End: 2023-08-09
Payer: COMMERCIAL

## 2023-08-09 ENCOUNTER — SPECIALTY PHARMACY (OUTPATIENT)
Dept: PHARMACY | Facility: CLINIC | Age: 38
End: 2023-08-09
Payer: COMMERCIAL

## 2023-08-09 NOTE — TELEPHONE ENCOUNTER
Specialty Pharmacy - Refill Coordination    Specialty Medication Orders Linked to Encounter      Flowsheet Row Most Recent Value   Medication #1 interferon beta-1a, albumin, (REBIF REBIDOSE) 44 mcg/0.5 mL PnIj (Order#[id number], Rx#[id number])            Refill Questions - Documented Responses      Flowsheet Row Most Recent Value   Patient Availability and HIPAA Verification    Does patient want to proceed with activity? Yes   HIPAA/medical authority confirmed? Yes   Relationship to patient of person spoken to? Self   Refill Screening Questions    Changes to allergies? No   Changes to medications? No   New conditions since last clinic visit? No   Unplanned office visit, urgent care, ED, or hospital admission in the last 4 weeks? No   How does patient/caregiver feel medication is working? Good   Financial problems or insurance changes? No   How many doses of your specialty medications were missed in the last 4 weeks? 0   Would patient like to speak to a pharmacist? No   When does the patient need to receive the medication? 08/18/23   Refill Delivery Questions    How will the patient receive the medication? MEDRx   When does the patient need to receive the medication? 08/18/23   Shipping Address Home   Address in [facility] confirmed and updated if neccessary? Yes   Expected Copay ($) 0   Is the patient able to afford the medication copay? Yes   Payment Method zero copay   Days supply of Refill 28   Supplies needed? No supplies needed   Refill activity completed? Yes   Refill activity plan Refill scheduled   Shipment/Pickup Date: 08/15/23            Current Outpatient Medications   Medication Sig    B complex-C-E-Zn Tab Take 1 tablet by mouth once daily.    drospirenone-ethinyl estradioL (MARCE) 3-0.02 mg per tablet Take 1 tablet by mouth once daily.    HYDROcodone-acetaminophen (NORCO) 5-325 mg per tablet Take 1 tablet by mouth every 4 (four) hours as needed for Pain. (Patient not taking: Reported on 5/31/2023)     interferon beta-1a, albumin, (REBIF REBIDOSE) 44 mcg/0.5 mL PnIj Inject 0.5 mLs (44 mcg total) into the skin 3 (three) times a week.    PNV no.95/ferrous fum/folic ac (PRENATAL MULTIVITAMINS ORAL) Take by mouth.    rizatriptan (MAXALT-MLT) 5 MG disintegrating tablet Take at onset of migraine, can repeat in 2 hrs if needed.  No more than 2 tabs per day or 3 days/wk.    semaglutide, weight loss, 0.25 mg/0.5 mL PnIj Inject 0.25 mg into the skin every 7 days.    vitamin D 1000 units Tab Take 5,000 Units by mouth once daily.    Last reviewed on 7/28/2023  8:26 AM by Patricia Parson, PAWilderC    Review of patient's allergies indicates:  No Known Allergies Last reviewed on  7/28/2023 8:26 AM by Patricia Parson      Tasks added this encounter   No tasks added.   Tasks due within next 3 months   8/18/2023 - Clinical Assessment (1 year recurrence)  8/12/2023 - Refill Coordination Outreach (1 time occurrence)     Kristy Abrams, PharmD  [facility]  [address]  [facility] [address]  Phone: [phone]  Fax: [phone]

## 2023-08-29 ENCOUNTER — PATIENT MESSAGE (OUTPATIENT)
Dept: NEUROLOGY | Facility: CLINIC | Age: 38
End: 2023-08-29
Payer: COMMERCIAL

## 2023-09-01 ENCOUNTER — PATIENT MESSAGE (OUTPATIENT)
Dept: NEUROLOGY | Facility: CLINIC | Age: 38
End: 2023-09-01
Payer: COMMERCIAL

## 2023-10-09 ENCOUNTER — HOSPITAL ENCOUNTER (OUTPATIENT)
Dept: RADIOLOGY | Facility: HOSPITAL | Age: 38
Discharge: HOME | End: 2023-10-09
Attending: NURSE PRACTITIONER
Payer: COMMERCIAL

## 2023-10-09 VITALS — WEIGHT: 125 LBS | BODY MASS INDEX: 20.83 KG/M2 | HEIGHT: 65 IN

## 2023-10-09 DIAGNOSIS — Z12.31 VISIT FOR SCREENING MAMMOGRAM: ICD-10-CM

## 2023-10-09 DIAGNOSIS — G35 MULTIPLE SCLEROSIS: ICD-10-CM

## 2023-10-09 DIAGNOSIS — D69.6 THROMBOCYTOPENIA: Primary | ICD-10-CM

## 2023-10-09 PROCEDURE — 77063 BREAST TOMOSYNTHESIS BI: CPT | Mod: 26,,, | Performed by: RADIOLOGY

## 2023-10-09 PROCEDURE — 77067 SCR MAMMO BI INCL CAD: CPT | Mod: TC,PO

## 2023-10-09 PROCEDURE — 77067 MAMMO DIGITAL SCREENING BILAT WITH TOMO: ICD-10-PCS | Mod: 26,,, | Performed by: RADIOLOGY

## 2023-10-09 PROCEDURE — 77067 SCR MAMMO BI INCL CAD: CPT | Mod: 26,,, | Performed by: RADIOLOGY

## 2023-10-09 PROCEDURE — 77063 MAMMO DIGITAL SCREENING BILAT WITH TOMO: ICD-10-PCS | Mod: 26,,, | Performed by: RADIOLOGY

## 2023-10-11 ENCOUNTER — PATIENT MESSAGE (OUTPATIENT)
Dept: NEUROLOGY | Facility: CLINIC | Age: 38
End: 2023-10-11
Payer: COMMERCIAL

## 2023-10-11 RX ORDER — INTERFERON BETA-1A 44 UG/.5ML
44 INJECTION, SOLUTION SUBCUTANEOUS
Qty: 36 PEN | Refills: 0 | Status: ACTIVE | OUTPATIENT
Start: 2023-10-11 | End: 2024-01-24 | Stop reason: SDUPTHER

## 2023-10-12 DIAGNOSIS — D69.6 THROMBOCYTOPENIA: Primary | ICD-10-CM

## 2023-10-16 ENCOUNTER — LAB VISIT (OUTPATIENT)
Dept: LAB | Facility: HOSPITAL | Age: 38
End: 2023-10-16
Payer: COMMERCIAL

## 2023-10-16 DIAGNOSIS — D69.6 THROMBOCYTOPENIA: ICD-10-CM

## 2023-10-16 LAB
MPV, BLUE TOP: 9.6 FL (ref 9.2–12.9)
PLATELET, BLUE TOP: 160 K/UL (ref 150–450)

## 2023-10-16 PROCEDURE — 36415 COLL VENOUS BLD VENIPUNCTURE: CPT | Mod: PO | Performed by: CLINICAL NURSE SPECIALIST

## 2023-10-16 PROCEDURE — 85049 AUTOMATED PLATELET COUNT: CPT | Performed by: CLINICAL NURSE SPECIALIST

## 2023-10-19 ENCOUNTER — TELEPHONE (OUTPATIENT)
Dept: NEUROLOGY | Facility: CLINIC | Age: 38
End: 2023-10-19
Payer: COMMERCIAL

## 2023-10-19 NOTE — TELEPHONE ENCOUNTER
King Perez Key: [id number] - PA Case ID: [id number]    PA submitted via [facility] on 10/19 for Rebif

## 2023-10-24 NOTE — TELEPHONE ENCOUNTER
CaseId:[id number];Status:Approved;Review Type:Prior Auth;Coverage Start Date:10/19/2023;Coverage End Date:10/18/2024    PA for REBIF approved

## 2023-11-27 ENCOUNTER — OFFICE VISIT (OUTPATIENT)
Dept: NEUROLOGY | Facility: CLINIC | Age: 38
End: 2023-11-27
Payer: COMMERCIAL

## 2023-11-27 VITALS
HEIGHT: 65 IN | BODY MASS INDEX: 21.47 KG/M2 | DIASTOLIC BLOOD PRESSURE: 73 MMHG | WEIGHT: 128.88 LBS | HEART RATE: 64 BPM | SYSTOLIC BLOOD PRESSURE: 107 MMHG

## 2023-11-27 DIAGNOSIS — Z29.89 PROPHYLACTIC IMMUNOTHERAPY: ICD-10-CM

## 2023-11-27 DIAGNOSIS — E55.9 VITAMIN D DEFICIENCY: ICD-10-CM

## 2023-11-27 DIAGNOSIS — G35 MS (MULTIPLE SCLEROSIS): ICD-10-CM

## 2023-11-27 DIAGNOSIS — Z71.89 COUNSELING REGARDING GOALS OF CARE: ICD-10-CM

## 2023-11-27 DIAGNOSIS — G47.00 INSOMNIA, UNSPECIFIED TYPE: Primary | ICD-10-CM

## 2023-11-27 PROCEDURE — 3008F PR BODY MASS INDEX (BMI) DOCUMENTED: ICD-10-PCS | Mod: CPTII,S$GLB,, | Performed by: PSYCHIATRY & NEUROLOGY

## 2023-11-27 PROCEDURE — 99214 PR OFFICE/OUTPT VISIT, EST, LEVL IV, 30-39 MIN: ICD-10-PCS | Mod: S$GLB,,, | Performed by: PSYCHIATRY & NEUROLOGY

## 2023-11-27 PROCEDURE — 99999 PR PBB SHADOW E&M-EST. PATIENT-LVL III: CPT | Mod: PBBFAC,,, | Performed by: PSYCHIATRY & NEUROLOGY

## 2023-11-27 PROCEDURE — 1160F RVW MEDS BY RX/DR IN RCRD: CPT | Mod: CPTII,S$GLB,, | Performed by: PSYCHIATRY & NEUROLOGY

## 2023-11-27 PROCEDURE — 3078F PR MOST RECENT DIASTOLIC BLOOD PRESSURE < 80 MM HG: ICD-10-PCS | Mod: CPTII,S$GLB,, | Performed by: PSYCHIATRY & NEUROLOGY

## 2023-11-27 PROCEDURE — 1160F PR REVIEW ALL MEDS BY PRESCRIBER/CLIN PHARMACIST DOCUMENTED: ICD-10-PCS | Mod: CPTII,S$GLB,, | Performed by: PSYCHIATRY & NEUROLOGY

## 2023-11-27 PROCEDURE — 3074F PR MOST RECENT SYSTOLIC BLOOD PRESSURE < 130 MM HG: ICD-10-PCS | Mod: CPTII,S$GLB,, | Performed by: PSYCHIATRY & NEUROLOGY

## 2023-11-27 PROCEDURE — 3008F BODY MASS INDEX DOCD: CPT | Mod: CPTII,S$GLB,, | Performed by: PSYCHIATRY & NEUROLOGY

## 2023-11-27 PROCEDURE — 3078F DIAST BP <80 MM HG: CPT | Mod: CPTII,S$GLB,, | Performed by: PSYCHIATRY & NEUROLOGY

## 2023-11-27 PROCEDURE — 99999 PR PBB SHADOW E&M-EST. PATIENT-LVL III: ICD-10-PCS | Mod: PBBFAC,,, | Performed by: PSYCHIATRY & NEUROLOGY

## 2023-11-27 PROCEDURE — 1159F MED LIST DOCD IN RCRD: CPT | Mod: CPTII,S$GLB,, | Performed by: PSYCHIATRY & NEUROLOGY

## 2023-11-27 PROCEDURE — 99214 OFFICE O/P EST MOD 30 MIN: CPT | Mod: S$GLB,,, | Performed by: PSYCHIATRY & NEUROLOGY

## 2023-11-27 PROCEDURE — 3074F SYST BP LT 130 MM HG: CPT | Mod: CPTII,S$GLB,, | Performed by: PSYCHIATRY & NEUROLOGY

## 2023-11-27 PROCEDURE — 1159F PR MEDICATION LIST DOCUMENTED IN MEDICAL RECORD: ICD-10-PCS | Mod: CPTII,S$GLB,, | Performed by: PSYCHIATRY & NEUROLOGY

## 2023-11-27 RX ORDER — TRAZODONE HYDROCHLORIDE 50 MG/1
TABLET ORAL
Qty: 30 TABLET | Refills: 11 | Status: SHIPPED | OUTPATIENT
Start: 2023-11-27

## 2023-11-27 NOTE — PROGRESS NOTES
Subjective:          Patient ID: King Perez is a 38 y.o. female who presents today for a  routine  clinic visit for MS.      MS HPI:  DMT: interferon beta-1a SQ  Side effects from DMT?  Some ISR -- she manages it   Taking vitamin D3 as recommended?  Yes - 10,000 IU /day   She denies depression, but does feel more anxious.  The anxiety comes in waves-- now affecting her sleep;  has been on Zoloft in the past, and is not inclined to restart.  It's affecting her sleep at this.     Medications:  Current Outpatient Medications   Medication Sig    B complex-C-E-Zn Tab Take 1 tablet by mouth once daily.    drospirenone-ethinyl estradioL (MARCE) 3-0.02 mg per tablet Take 1 tablet by mouth once daily.    interferon beta-1a, albumin, (REBIF REBIDOSE) 44 mcg/0.5 mL PnIj Inject 0.5 mLs (44 mcg total) into the skin 3 (three) times a week.    PNV no.95/ferrous fum/folic ac (PRENATAL MULTIVITAMINS ORAL) Take by mouth.    rizatriptan (MAXALT-MLT) 5 MG disintegrating tablet Take at onset of migraine, can repeat in 2 hrs if needed.  No more than 2 tabs per day or 3 days/wk.    semaglutide, weight loss, 0.25 mg/0.5 mL PnIj Inject 0.25 mg into the skin every 7 days.    vitamin D 1000 units Tab Take 5,000 Units by mouth once daily.      No current facility-administered medications for this visit.       SOCIAL HISTORY  Social History     Tobacco Use    Smoking status: Never    Smokeless tobacco: Never   Substance Use Topics    Alcohol use: Yes     Comment: socially    Drug use: No       Living arrangements - the patient lives with their spouse.            11/26/2023     9:39 PM   REVIEW OF SYMPTOMS   Do you feel abnormally tired on most days? No   Do you feel you generally sleep well? No   Do you have difficulty controlling your bladder?  No   Do you have difficulty controlling your bowels?  No   Do you have frequent muscle cramps, tightness or spasms in your limbs?  No   Do you have new visual symptoms?  No   Do you have  worsening difficulty with your memory or thinking? No   Do you have worsening symptoms of anxiety or depression?  Yes   For patients who walk, Do you have more difficulty walking?  No   Have you fallen since your last visit?  No   For patients who use wheelchairs: Do you have any skin wounds or breakdown? Not Applicable   Do you have difficulty using your hands?  No   Do you have shooting or burning pain? No   Do you have difficulty with sexual function?  No   If you are sexually active, are you using birth control? Y/N  N/A Yes   Do you often choke when swallowing liquids or solid food?  No   Do you experience worsening symptoms when overheated? No   Do you need any new equipment such as a wheelchair, walker or shower chair? No   Do you receive co-pay financial assistance for your principal MS medicine? Yes   Would you be interested in participating in an MS research trial in the future? No   For patients on Gilenya, Tecfidera, Aubagio, Rituxan, Ocrevus, Tysabri, Lemtrada or Methotrexate, are you aware that you should NOT receive live virus vaccines?  Not Applicable   Do you feel you have adequate family/friend support?  Yes   Do you have health insurance?   Yes   Are you currently employed? Yes   Do you receive SSDI/SSI?  Not Applicable   Do you use marijuana or cannabis products? Yes   How often? Monthly   Have you been diagnosed with a urinary tract infection since your last visit here? No   Have you been diagnosed with a respiratory tract infection since your last visit here? No   Have you been to the emergency room since your last visit here? No   Have you been hospitalized since your last visit here?  No           11/26/2023     9:43 PM   FSS SCORE & INTERPRETATION   FSS SCORE  21   FSS SCORE INTERPRETATION May not be suffering from fatigue         11/26/2023     9:42 PM   MS DONOVAN-D SCORE & INTERPRETATION   DONOVAN-D SCORE  9   DONOVAN-D INTERPRETATION  No indication of Depression         11/26/2023     9:40 PM   MS  ARIEL-7 SCORE & INTERPRETATION   ARIEL-7 SCORE  5   ARIEL-7 SCORE INTERPRETATION Mild Anxiety         11/26/2023     9:44 PM   PEQ MS MOS PAIN EFFECTS SCORE & INTERPRETATION   PES SCORE 8   PES SCORE INTERPRETATION Scores can range from 6-30.  Items are scaled so that higher scores indicate a greater impact of pain on a patients mood and behavior.         11/26/2023     9:44 PM   PEQ MS SEXUAL SATISFACTION SCORE & INTERPRETATION   SSS SCORE  4   SSS SCORE INTERPRETATION Scores can range from 4-24.  Higher scores indicate greater problems with sexual satisfaction.         11/26/2023     9:44 PM   MS BLADDER CONTROL SCORE & INTERPRETATION   BLCS SCORE 0   BLCS SCORE INTERPRETATION  Scores can range from 0-22, with higher scores indicating greater bladder control problems.         11/26/2023     9:47 PM   MS BOWEL CONTROL SCORE & INTERPRETATION   BWCS SCORE 1   BWCS SCORE INTERPRETATION Scores can range from 0-26, with higher scores indicating greater bowel control problems.         11/26/2023     9:45 PM   PEQ MS IMPACT OF VISUAL IMPAIRMENT SCORE & INTERPRETATION   VA SCALE SCORE  0   VA SCORE INTERPRETATION Scores can range from 0-15, with higher scores indicating greater impact of visual problems on daily activites.         11/26/2023     9:47 PM   MS PDQ SCORE & INTERPRETATION   PDQ RETROSPECTIVE MEMORY SUBSCALE 5   PDQ ATTENTION/CONCENTRATION SUBSCALE 7   PDQ PROSPECTIVE MEMORY SUBSCALE 5   PDQ PLANNING/ORGANIZATION SUBSCALE 6   PDQ TOTAL SCORE 23   PDQ SCORE INTERPRETATION Scores can range from 0-80, with higher scores indicating greater perceived cognitive impairment.         11/26/2023     9:48 PM   MSSS SCORE & INTERPRETATION   MSSS TANGIBLE SUPPORT SUBSCALE 100   MSSS EMOTIONAL/INFORMATIONAL SUPPORT SUBSCALE 100   MSSS AFFECTIONATE SUPPORT SUBSCALE 100   MSSS POSITIVE SOCIAL INTERACTION SUBSCALE 100   MSSS TOTAL SCORE 100   MSSS SCORE INTERPRETATION Scores can range from 0-100, with higher scores indicating  greater perceived support.                Objective:            5/31/2023    12:01 AM 11/27/2023    12:01 AM   Timed 25 Foot Walk:   Did patient wear an AFO? No No   Was assistive device used? No No   Time for 25 Foot Walk (seconds) 3.8 4.1   Time for 25 Foot Walk (seconds) 4.2        Neurologic Exam  MENTAL STATUS: grossly intact  CRANIAL NERVE EXAM: There is no internuclear ophthalmoplegia. Extraocular   muscles are intact.  No facial   asymmetry.There is no dysarthria.   MOTOR EXAM: Normal bulk and tone throughout UE and LE bilaterally. Rapid sequential movements are normal. Strength is 5/5 in all groups   in the lower extremities and upper extremities.   REFLEXES: Symmetric and 2+ throughout in all 4 extremities.   SENSORY EXAM: Normal to vibration t/o  COORDINATION: Normal finger-to-nose exam.   GAIT: Narrow based and stable.      Imaging:     Results for orders placed during the hospital encounter of 11/07/22    MRI Brain Demyelinating Without Contrast    Impression  Stable white matter lesions intracranially with no new lesion identified.      Electronically signed by: Vipul Angeles  Date:    11/07/2022  Time:    14:50    Results for orders placed during the hospital encounter of 11/07/22    MRI Cervical Spine Demyelinating Without Contrast    Impression  Stable appearance of the lesions within the cervical cord with no new lesion.      Electronically signed by: Vipul Angeles  Date:    11/07/2022  Time:    15:46    Results for orders placed during the hospital encounter of 11/07/22    MRI Thoracic Spine Demyelinating Without Contrast    Impression  Stable lesions within the thoracic spinal cord with no new lesion identified.      Electronically signed by: Vipul Angeles  Date:    11/07/2022  Time:    15:51    Results for orders placed during the hospital encounter of 05/08/23    MRI Brain Demyelinating W W/O Contrast    Impression  No significant change from prior.  Essentially stable T2 FLAIR lesion burden  throughout the brain parenchyma in light of history remains concerning for mild degree of prior demyelinating plaque    No new lesion or enhancing lesion to suggest significant interval or active demyelination.    Clinical correlation and continued follow-up advised.      Electronically signed by: Dennis Palmer DO  Date:    05/08/2023  Time:    12:49    Results for orders placed during the hospital encounter of 05/08/23    MRI Cervical Spine Demyelinating W W/O Contrast    Impression  Continued short segment regions of T2 stir signal abnormality in the cervical and thoracic cord as detailed above most compatible with prior areas of demyelination in light of history. No new cord signal abnormality or abnormal intrathecal enhancement to suggest significant interval or active demyelination.    Clinical correlation and continued follow-up advised.      Electronically signed by: Dennis Palmer DO  Date:    05/08/2023  Time:    14:00    Results for orders placed during the hospital encounter of 05/08/23    MRI Thoracic Spine Demyelinating W W/O Contrast    Impression  Continued short segment regions of T2 stir signal abnormality in the cervical and thoracic cord as detailed above most compatible with prior areas of demyelination in light of history. No new cord signal abnormality or abnormal intrathecal enhancement to suggest significant interval or active demyelination.    Clinical correlation and continued follow-up advised.      Electronically signed by: Dennis Palmer DO  Date:    05/08/2023  Time:    14:00        Labs:     Lab Results   Component Value Date    QHEREDMK02QK 67 11/14/2022    XIELYFUF79TG 81 10/25/2021    DQOFFALT46ZC 59 10/21/2020     Lab Results   Component Value Date    JCVINDEX 1.52 (A) 02/06/2020    JCVANTIBODY Positive (A) 02/06/2020     Lab Results   Component Value Date    KG5RZPQA 73.8 02/06/2020    ABSOLUTECD3 1007.0 02/06/2020    UK5HMWBL 17.1 02/06/2020    ABSOLUTECD8 234.0 02/06/2020     KY2JLQHP 55.7 02/06/2020    ABSOLUTECD4 761.0 02/06/2020    LABCD48 3.25 02/06/2020     Lab Results   Component Value Date    WBC 10.83 10/09/2023    RBC 3.53 (L) 10/09/2023    HGB 12.1 10/09/2023    HCT 36.2 (L) 10/09/2023     (H) 10/09/2023    MCH 34.3 (H) 10/09/2023    MCHC 33.4 10/09/2023    RDW 11.8 10/09/2023    PLT 72 (L) 10/09/2023    MPV 10.8 10/09/2023    GRAN 7.6 10/09/2023    GRAN 70.1 10/09/2023    LYMPH 2.8 10/09/2023    LYMPH 25.5 10/09/2023    MONO 0.3 10/09/2023    MONO 3.1 (L) 10/09/2023    EOS 0.1 10/09/2023    BASO 0.03 10/09/2023    EOSINOPHIL 0.7 10/09/2023    BASOPHIL 0.3 10/09/2023     Sodium   Date Value Ref Range Status   11/14/2022 136 136 - 145 mmol/L Final     Potassium   Date Value Ref Range Status   11/14/2022 3.8 3.5 - 5.1 mmol/L Final     Chloride   Date Value Ref Range Status   11/14/2022 105 95 - 110 mmol/L Final     CO2   Date Value Ref Range Status   11/14/2022 24 23 - 29 mmol/L Final     Glucose   Date Value Ref Range Status   11/14/2022 89 70 - 110 mg/dL Final     BUN   Date Value Ref Range Status   11/14/2022 6 6 - 20 mg/dL Final     Creatinine   Date Value Ref Range Status   11/14/2022 0.7 0.5 - 1.4 mg/dL Final     Calcium   Date Value Ref Range Status   11/14/2022 9.4 8.7 - 10.5 mg/dL Final     Total Protein   Date Value Ref Range Status   10/09/2023 7.3 6.0 - 8.4 g/dL Final     Albumin   Date Value Ref Range Status   10/09/2023 4.0 3.5 - 5.2 g/dL Final     Total Bilirubin   Date Value Ref Range Status   10/09/2023 0.3 0.1 - 1.0 mg/dL Final     Comment:     For infants and newborns, interpretation of results should be based  on gestational age, weight and in agreement with clinical  observations.    Premature Infant recommended reference ranges:  Up to 24 hours.............<8.0 mg/dL  Up to 48 hours............<12.0 mg/dL  3-5 days..................<15.0 mg/dL  6-29 days.................<15.0 mg/dL       Alkaline Phosphatase   Date Value Ref Range Status   10/09/2023  34 (L) 55 - 135 U/L Final     AST   Date Value Ref Range Status   10/09/2023 25 10 - 40 U/L Final     ALT   Date Value Ref Range Status   10/09/2023 26 10 - 44 U/L Final     Anion Gap   Date Value Ref Range Status   11/14/2022 7 (L) 8 - 16 mmol/L Final     eGFR if    Date Value Ref Range Status   06/23/2022 >60.0 >60 mL/min/1.73 m^2 Final     eGFR if non    Date Value Ref Range Status   06/23/2022 >60.0 >60 mL/min/1.73 m^2 Final     Comment:     Calculation used to obtain the estimated glomerular filtration  rate (eGFR) is the CKD-EPI equation.        Lab Results   Component Value Date    HEPBSAG Negative 04/18/2022    HEPBSAB Positive 04/18/2022    HEPBCAB Negative 04/18/2022           MS Impression and Plan:     NEURO MULTIPLE SCLEROSIS IMPRESSION:   MS Status:     Number of relapses in the past year?:  0    Clinical Progression:  Clinically Stable    MRI Progression:  Stable  Plan:     DMT:  No change in management    Symptom Management:  Implement change in symptom management    Implement Change in Symptom Management:  Mood and Sleep (start low dose trazodone hs; resume talk therapy with our team- pt is aware there may be a wait for this)     MELISSA  Labs in April   MRI in May then f/u Margie Fierro [facility]          Problem List Items Addressed This Visit    None  Visit Diagnoses       Insomnia, unspecified type    -  Primary    Relevant Medications    traZODone (DESYREL) 50 MG tablet    MS (multiple sclerosis)        Relevant Orders    MRI Brain Demyelinating Without Contrast    CBC Auto Differential    Hepatic Function Panel    Vitamin D    Vitamin D deficiency        Relevant Orders    Vitamin D    Prophylactic immunotherapy        Relevant Orders    CBC Auto Differential    Hepatic Function Panel    Counseling regarding goals of care                Marci Hernandez MD    I spent a total of 35 minutes on the day of the visit.This includes face to face time and non-face to face time  preparing to see the patient (eg, review of tests), obtaining and/or reviewing separately obtained history, documenting clinical information in the electronic or other health record, independently interpreting results and communicating results to the patient/family/caregiver, or care coordinator.

## 2023-11-27 NOTE — Clinical Note
D, pt would like to restart talk therapy with you.  I explained to her that will be a potentially long wait but possible to add her to the list?  She's not depressed, but is anxious.

## 2024-01-22 ENCOUNTER — PATIENT MESSAGE (OUTPATIENT)
Dept: PSYCHIATRY | Facility: CLINIC | Age: 39
End: 2024-01-22
Payer: COMMERCIAL

## 2024-01-24 DIAGNOSIS — G35 MULTIPLE SCLEROSIS: ICD-10-CM

## 2024-01-25 ENCOUNTER — PATIENT MESSAGE (OUTPATIENT)
Dept: NEUROLOGY | Facility: CLINIC | Age: 39
End: 2024-01-25
Payer: COMMERCIAL

## 2024-01-25 DIAGNOSIS — K06.9 DISEASE OF GINGIVA DUE TO RECURRENT ORAL HERPES SIMPLEX VIRUS (HSV) INFECTION: ICD-10-CM

## 2024-01-25 DIAGNOSIS — B00.9 DISEASE OF GINGIVA DUE TO RECURRENT ORAL HERPES SIMPLEX VIRUS (HSV) INFECTION: ICD-10-CM

## 2024-01-25 RX ORDER — INTERFERON BETA-1A 44 UG/.5ML
44 INJECTION, SOLUTION SUBCUTANEOUS
Qty: 36 PEN | Refills: 0 | Status: ACTIVE | OUTPATIENT
Start: 2024-01-26 | End: 2024-05-08 | Stop reason: SDUPTHER

## 2024-01-25 RX ORDER — VALACYCLOVIR HYDROCHLORIDE 1 G/1
1000 TABLET, FILM COATED ORAL 2 TIMES DAILY PRN
Qty: 30 TABLET | Refills: 5 | Status: SHIPPED | OUTPATIENT
Start: 2024-01-25 | End: 2025-01-24

## 2024-03-24 DIAGNOSIS — Z30.41 ENCOUNTER FOR SURVEILLANCE OF CONTRACEPTIVE PILLS: ICD-10-CM

## 2024-03-25 ENCOUNTER — OFFICE VISIT (OUTPATIENT)
Dept: PRIMARY CARE CLINIC | Facility: CLINIC | Age: 39
End: 2024-03-25
Payer: COMMERCIAL

## 2024-03-25 VITALS
OXYGEN SATURATION: 96 % | SYSTOLIC BLOOD PRESSURE: 112 MMHG | DIASTOLIC BLOOD PRESSURE: 83 MMHG | HEART RATE: 69 BPM | BODY MASS INDEX: 22.03 KG/M2 | HEIGHT: 65 IN | WEIGHT: 132.25 LBS

## 2024-03-25 DIAGNOSIS — G35 MULTIPLE SCLEROSIS: ICD-10-CM

## 2024-03-25 DIAGNOSIS — R10.32 LEFT LOWER QUADRANT PAIN: ICD-10-CM

## 2024-03-25 DIAGNOSIS — D69.6 THROMBOCYTOPENIA: ICD-10-CM

## 2024-03-25 DIAGNOSIS — Z00.00 ANNUAL PHYSICAL EXAM: Primary | ICD-10-CM

## 2024-03-25 DIAGNOSIS — D84.9 IMMUNOSUPPRESSION: ICD-10-CM

## 2024-03-25 DIAGNOSIS — Z12.39 ENCOUNTER FOR SCREENING FOR MALIGNANT NEOPLASM OF BREAST, UNSPECIFIED SCREENING MODALITY: ICD-10-CM

## 2024-03-25 LAB
BILIRUB UR QL STRIP: NEGATIVE
CLARITY UR REFRACT.AUTO: CLEAR
COLOR UR AUTO: YELLOW
GLUCOSE UR QL STRIP: NEGATIVE
HGB UR QL STRIP: NEGATIVE
KETONES UR QL STRIP: NEGATIVE
LEUKOCYTE ESTERASE UR QL STRIP: NEGATIVE
NITRITE UR QL STRIP: NEGATIVE
PH UR STRIP: 8 [PH] (ref 5–8)
PROT UR QL STRIP: NEGATIVE
SP GR UR STRIP: 1.01 (ref 1–1.03)
URN SPEC COLLECT METH UR: NORMAL

## 2024-03-25 PROCEDURE — 1159F MED LIST DOCD IN RCRD: CPT | Mod: CPTII,S$GLB,, | Performed by: STUDENT IN AN ORGANIZED HEALTH CARE EDUCATION/TRAINING PROGRAM

## 2024-03-25 PROCEDURE — 1160F RVW MEDS BY RX/DR IN RCRD: CPT | Mod: CPTII,S$GLB,, | Performed by: STUDENT IN AN ORGANIZED HEALTH CARE EDUCATION/TRAINING PROGRAM

## 2024-03-25 PROCEDURE — 3008F BODY MASS INDEX DOCD: CPT | Mod: CPTII,S$GLB,, | Performed by: STUDENT IN AN ORGANIZED HEALTH CARE EDUCATION/TRAINING PROGRAM

## 2024-03-25 PROCEDURE — 99386 PREV VISIT NEW AGE 40-64: CPT | Mod: S$GLB,,, | Performed by: STUDENT IN AN ORGANIZED HEALTH CARE EDUCATION/TRAINING PROGRAM

## 2024-03-25 PROCEDURE — 99999 PR PBB SHADOW E&M-EST. PATIENT-LVL IV: CPT | Mod: PBBFAC,,, | Performed by: STUDENT IN AN ORGANIZED HEALTH CARE EDUCATION/TRAINING PROGRAM

## 2024-03-25 PROCEDURE — 81003 URINALYSIS AUTO W/O SCOPE: CPT | Performed by: STUDENT IN AN ORGANIZED HEALTH CARE EDUCATION/TRAINING PROGRAM

## 2024-03-25 PROCEDURE — 3074F SYST BP LT 130 MM HG: CPT | Mod: CPTII,S$GLB,, | Performed by: STUDENT IN AN ORGANIZED HEALTH CARE EDUCATION/TRAINING PROGRAM

## 2024-03-25 PROCEDURE — 3079F DIAST BP 80-89 MM HG: CPT | Mod: CPTII,S$GLB,, | Performed by: STUDENT IN AN ORGANIZED HEALTH CARE EDUCATION/TRAINING PROGRAM

## 2024-03-25 RX ORDER — DROSPIRENONE AND ETHINYL ESTRADIOL 0.02-3(28)
1 KIT ORAL DAILY
Qty: 84 TABLET | Refills: 3 | Status: SHIPPED | OUTPATIENT
Start: 2024-03-25 | End: 2024-06-10 | Stop reason: SDUPTHER

## 2024-03-25 NOTE — PROGRESS NOTES
Office visit  Patient: King Perez   3/25/2024     Assessment:     1. Annual physical exam    2. Multiple sclerosis    3. Encounter for screening for malignant neoplasm of breast, unspecified screening modality    4. Left lower quadrant pain    5. Immunosuppression    6. Thrombocytopenia      Plan:       1. Annual physical exam  -     TSH; Future; Expected date: 03/25/2024  -     Hemoglobin A1C; Future; Expected date: 03/25/2024  -     Lipid Panel; Future; Expected date: 03/25/2024  -     Comprehensive Metabolic Panel; Future; Expected date: 03/25/2024        -Discussed healthy habits and recommended preventative screening    2. Multiple sclerosis       -stable, continue current therapy       -follow up with neurology    3. Encounter for screening for malignant neoplasm of breast, unspecified screening modality  -     Mammo Digital Screening Bilat w/ Vishal; Future; Expected date: 09/25/2024    4. Left lower quadrant pain  -     Urinalysis, Reflex to Urine Culture Urine, Clean Catch        -broad DDX, could be uterine cramping, could be mild gastroenteritis, could be UTI; will consider ultrasound if symptoms do not improve on their own - pelvic vs abdomen    5. Immunosuppression      -stable, due to MS therapy    6. Thrombocytopenia       -stable, has been worked up in the past by hematology       -will recheck CBC    Return to clinic in 1 year or sooner as needed.          CHIEF COMPLAINT: annual exam    HPI: King Perez is a 39 y.o. female who presents for an annual physical and to establish care.    She reports pain in her lower left abdomen. She also reports chills for the past week.  The chills come and go.  She states that the pain feels like a sharp cramp. The pain has only been going on for a week. She denies constipation, diarrhea.  She hasn't taken anything for the pain. The pain lasts for a few minutes at a time. No association with food or drink.  No pain that wakes her up from sleep.  She  "reports pain similar to this with a UTI in the past.  She reports that the pain is about a 4-5/10.  She also reports feeling off in general, but has a hard time pinning down specific symptoms.    She reports that she doesn't get periods on birth control because she doesn't take the placebo pills.    She reports good diet and regular exercise. She wears seat belt and sunscreen.        Current Outpatient Medications   Medication Instructions    B complex-C-E-Zn Tab 1 tablet, Oral, Daily    drospirenone-ethinyl estradioL (MARCE) 3-0.02 mg per tablet 1 tablet, Oral, Daily    interferon beta-1a, albumin, (REBIF REBIDOSE) 44 mcg/0.5 mL PnIj 44 mcg, Subcutaneous, Three times weekly    rizatriptan (MAXALT-MLT) 5 MG disintegrating tablet Take at onset of migraine, can repeat in 2 hrs if needed.  No more than 2 tabs per day or 3 days/wk.    semaglutide (weight loss) 0.25 mg, Subcutaneous, Every 7 days    traZODone (DESYREL) 50 MG tablet Take 1 to 1/2 tab po hs    valACYclovir (VALTREX) 1,000 mg, Oral, 2 times daily PRN    vitamin D (VITAMIN D3) 5,000 Units, Oral, Daily       Lab Results   Component Value Date    HGBA1C 5.0 03/21/2019    HGBA1C 4.9 02/21/2015     No results found for: "MICALBCREAT"  Lab Results   Component Value Date    LDLCALC 63.0 01/11/2021    LDLCALC 70.6 03/21/2019    CHOL 171 01/11/2021    HDL 92 (H) 01/11/2021    TRIG 80 01/11/2021       Lab Results   Component Value Date     11/14/2022    K 3.8 11/14/2022     11/14/2022    CO2 24 11/14/2022    GLU 89 11/14/2022    BUN 6 11/14/2022    CREATININE 0.7 11/14/2022    CALCIUM 9.4 11/14/2022    PROT 7.3 10/09/2023    ALBUMIN 4.0 10/09/2023    BILITOT 0.3 10/09/2023    ALKPHOS 34 (L) 10/09/2023    AST 25 10/09/2023    ALT 26 10/09/2023    ANIONGAP 7 (L) 11/14/2022    ESTGFRAFRICA >60.0 06/23/2022    EGFRNONAA >60.0 06/23/2022    WBC 10.83 10/09/2023    HGB 12.1 10/09/2023    HGB 12.3 04/03/2023    HCT 36.2 (L) 10/09/2023     (H) 10/09/2023    " "PLT 72 (L) 10/09/2023    TSH 0.889 01/11/2021    HEPCAB Negative 02/06/2020       Lab Results   Component Value Date    JREQWGFZ41XE 67 11/14/2022    FTMISZYR30WW 81 10/25/2021    ZMJAUNRT35 573 01/11/2021    FERRITIN 93 03/21/2019    IRON 131 03/21/2019    TRANSFERRIN 278 03/21/2019    TIBC 411 03/21/2019    FESATURATED 32 03/21/2019         Past Medical History:   Diagnosis Date    Multiple sclerosis      Past Surgical History:   Procedure Laterality Date    CERVICAL BIOPSY  W/ LOOP ELECTRODE EXCISION      DILATION AND CURETTAGE OF UTERUS  2014    MASTOPEXY Bilateral 04/11/2023    Procedure: MASTOPEXY;  Surgeon: Miguel Rodriguez Jr., MD;  Location: [facility];  Service: Plastics;  Laterality: Bilateral;  3HRS     Vitals:    03/25/24 1137   BP: 112/83   Pulse: 69   SpO2: 96%   Weight: 60 kg (132 lb 4.4 oz)   Height: 5' 5" (1.651 m)   PainSc:   4   PainLoc: Abdomen     Objective:   Physical Exam  Vitals reviewed.   Constitutional:       General: She is not in acute distress.     Appearance: Normal appearance. She is well-developed.   HENT:      Head: Normocephalic and atraumatic.      Right Ear: Tympanic membrane, ear canal and external ear normal.      Left Ear: Tympanic membrane, ear canal and external ear normal.      Nose: Nose normal.      Mouth/Throat:      Mouth: Mucous membranes are moist.      Pharynx: No oropharyngeal exudate or posterior oropharyngeal erythema.   Eyes:      General: No scleral icterus.        Right eye: No discharge.         Left eye: No discharge.      Conjunctiva/sclera: Conjunctivae normal.   Neck:      Thyroid: No thyromegaly or thyroid tenderness.   Cardiovascular:      Rate and Rhythm: Normal rate and regular rhythm.      Heart sounds: Normal heart sounds. No murmur heard.     No friction rub. No gallop.   Pulmonary:      Effort: Pulmonary effort is normal. No respiratory distress.      Breath sounds: Normal breath sounds. No wheezing, rhonchi or rales.   Musculoskeletal:         " General: No deformity.      Right lower leg: No edema.      Left lower leg: No edema.   Lymphadenopathy:      Cervical: No cervical adenopathy.   Skin:     General: Skin is warm and dry.   Neurological:      General: No focal deficit present.      Mental Status: She is alert and oriented to person, place, and time.   Psychiatric:         Mood and Affect: Mood normal.         Behavior: Behavior normal.         Thought Content: Thought content normal.             Angelina Melgar MD  Internal Medicine and Pediatrics

## 2024-03-26 ENCOUNTER — LAB VISIT (OUTPATIENT)
Dept: LAB | Facility: HOSPITAL | Age: 39
End: 2024-03-26
Attending: STUDENT IN AN ORGANIZED HEALTH CARE EDUCATION/TRAINING PROGRAM
Payer: COMMERCIAL

## 2024-03-26 DIAGNOSIS — Z00.00 ANNUAL PHYSICAL EXAM: ICD-10-CM

## 2024-03-26 LAB
ALBUMIN SERPL BCP-MCNC: 3.4 G/DL (ref 3.5–5.2)
ALP SERPL-CCNC: 31 U/L (ref 55–135)
ALT SERPL W/O P-5'-P-CCNC: 17 U/L (ref 10–44)
ANION GAP SERPL CALC-SCNC: 7 MMOL/L (ref 8–16)
AST SERPL-CCNC: 20 U/L (ref 10–40)
BILIRUB SERPL-MCNC: 0.4 MG/DL (ref 0.1–1)
BUN SERPL-MCNC: 7 MG/DL (ref 6–20)
CALCIUM SERPL-MCNC: 8.9 MG/DL (ref 8.7–10.5)
CHLORIDE SERPL-SCNC: 106 MMOL/L (ref 95–110)
CHOLEST SERPL-MCNC: 166 MG/DL (ref 120–199)
CHOLEST/HDLC SERPL: 2 {RATIO} (ref 2–5)
CO2 SERPL-SCNC: 21 MMOL/L (ref 23–29)
CREAT SERPL-MCNC: 0.7 MG/DL (ref 0.5–1.4)
EST. GFR  (NO RACE VARIABLE): >60 ML/MIN/1.73 M^2
ESTIMATED AVG GLUCOSE: 88 MG/DL (ref 68–131)
GLUCOSE SERPL-MCNC: 91 MG/DL (ref 70–110)
HBA1C MFR BLD: 4.7 % (ref 4–5.6)
HDLC SERPL-MCNC: 81 MG/DL (ref 40–75)
HDLC SERPL: 48.8 % (ref 20–50)
LDLC SERPL CALC-MCNC: 61.2 MG/DL (ref 63–159)
NONHDLC SERPL-MCNC: 85 MG/DL
POTASSIUM SERPL-SCNC: 3.9 MMOL/L (ref 3.5–5.1)
PROT SERPL-MCNC: 6.5 G/DL (ref 6–8.4)
SODIUM SERPL-SCNC: 134 MMOL/L (ref 136–145)
TRIGL SERPL-MCNC: 119 MG/DL (ref 30–150)
TSH SERPL DL<=0.005 MIU/L-ACNC: 1.16 UIU/ML (ref 0.4–4)

## 2024-03-26 PROCEDURE — 36415 COLL VENOUS BLD VENIPUNCTURE: CPT | Mod: PO | Performed by: STUDENT IN AN ORGANIZED HEALTH CARE EDUCATION/TRAINING PROGRAM

## 2024-03-26 PROCEDURE — 80053 COMPREHEN METABOLIC PANEL: CPT | Performed by: STUDENT IN AN ORGANIZED HEALTH CARE EDUCATION/TRAINING PROGRAM

## 2024-03-26 PROCEDURE — 80061 LIPID PANEL: CPT | Performed by: STUDENT IN AN ORGANIZED HEALTH CARE EDUCATION/TRAINING PROGRAM

## 2024-03-26 PROCEDURE — 83036 HEMOGLOBIN GLYCOSYLATED A1C: CPT | Performed by: STUDENT IN AN ORGANIZED HEALTH CARE EDUCATION/TRAINING PROGRAM

## 2024-03-26 PROCEDURE — 84443 ASSAY THYROID STIM HORMONE: CPT | Performed by: STUDENT IN AN ORGANIZED HEALTH CARE EDUCATION/TRAINING PROGRAM

## 2024-04-09 ENCOUNTER — LAB VISIT (OUTPATIENT)
Dept: LAB | Facility: HOSPITAL | Age: 39
End: 2024-04-09
Attending: PSYCHIATRY & NEUROLOGY
Payer: COMMERCIAL

## 2024-04-09 DIAGNOSIS — E55.9 VITAMIN D DEFICIENCY: ICD-10-CM

## 2024-04-09 DIAGNOSIS — G35 MS (MULTIPLE SCLEROSIS): ICD-10-CM

## 2024-04-09 DIAGNOSIS — Z29.89 PROPHYLACTIC IMMUNOTHERAPY: ICD-10-CM

## 2024-04-09 LAB
25(OH)D3+25(OH)D2 SERPL-MCNC: 95 NG/ML (ref 30–96)
ALBUMIN SERPL BCP-MCNC: 3.6 G/DL (ref 3.5–5.2)
ALP SERPL-CCNC: 32 U/L (ref 55–135)
ALT SERPL W/O P-5'-P-CCNC: 21 U/L (ref 10–44)
AST SERPL-CCNC: 26 U/L (ref 10–40)
BASOPHILS # BLD AUTO: 0.03 K/UL (ref 0–0.2)
BASOPHILS NFR BLD: 0.5 % (ref 0–1.9)
BILIRUB DIRECT SERPL-MCNC: 0.1 MG/DL (ref 0.1–0.3)
BILIRUB SERPL-MCNC: 0.4 MG/DL (ref 0.1–1)
DIFFERENTIAL METHOD BLD: ABNORMAL
EOSINOPHIL # BLD AUTO: 0.1 K/UL (ref 0–0.5)
EOSINOPHIL NFR BLD: 1.7 % (ref 0–8)
ERYTHROCYTE [DISTWIDTH] IN BLOOD BY AUTOMATED COUNT: 12.3 % (ref 11.5–14.5)
HCT VFR BLD AUTO: 33.2 % (ref 37–48.5)
HGB BLD-MCNC: 11 G/DL (ref 12–16)
IMM GRANULOCYTES # BLD AUTO: 0.01 K/UL (ref 0–0.04)
IMM GRANULOCYTES NFR BLD AUTO: 0.2 % (ref 0–0.5)
LYMPHOCYTES # BLD AUTO: 1.7 K/UL (ref 1–4.8)
LYMPHOCYTES NFR BLD: 29.2 % (ref 18–48)
MCH RBC QN AUTO: 34 PG (ref 27–31)
MCHC RBC AUTO-ENTMCNC: 33.1 G/DL (ref 32–36)
MCV RBC AUTO: 103 FL (ref 82–98)
MONOCYTES # BLD AUTO: 0.3 K/UL (ref 0.3–1)
MONOCYTES NFR BLD: 5.9 % (ref 4–15)
NEUTROPHILS # BLD AUTO: 3.6 K/UL (ref 1.8–7.7)
NEUTROPHILS NFR BLD: 62.5 % (ref 38–73)
NRBC BLD-RTO: 0 /100 WBC
PLATELET # BLD AUTO: 58 K/UL (ref 150–450)
PMV BLD AUTO: 11.5 FL (ref 9.2–12.9)
PROT SERPL-MCNC: 6.6 G/DL (ref 6–8.4)
RBC # BLD AUTO: 3.24 M/UL (ref 4–5.4)
WBC # BLD AUTO: 5.75 K/UL (ref 3.9–12.7)

## 2024-04-09 PROCEDURE — 80076 HEPATIC FUNCTION PANEL: CPT | Performed by: PSYCHIATRY & NEUROLOGY

## 2024-04-09 PROCEDURE — 36415 COLL VENOUS BLD VENIPUNCTURE: CPT | Mod: PO | Performed by: PSYCHIATRY & NEUROLOGY

## 2024-04-09 PROCEDURE — 82306 VITAMIN D 25 HYDROXY: CPT | Performed by: PSYCHIATRY & NEUROLOGY

## 2024-04-09 PROCEDURE — 85025 COMPLETE CBC W/AUTO DIFF WBC: CPT | Performed by: PSYCHIATRY & NEUROLOGY

## 2024-04-10 DIAGNOSIS — D64.9 ANEMIA, UNSPECIFIED TYPE: Primary | ICD-10-CM

## 2024-04-23 ENCOUNTER — PATIENT MESSAGE (OUTPATIENT)
Dept: PRIMARY CARE CLINIC | Facility: CLINIC | Age: 39
End: 2024-04-23
Payer: COMMERCIAL

## 2024-04-30 ENCOUNTER — LAB VISIT (OUTPATIENT)
Dept: LAB | Facility: HOSPITAL | Age: 39
End: 2024-04-30
Attending: STUDENT IN AN ORGANIZED HEALTH CARE EDUCATION/TRAINING PROGRAM
Payer: COMMERCIAL

## 2024-04-30 DIAGNOSIS — D64.9 ANEMIA, UNSPECIFIED TYPE: ICD-10-CM

## 2024-04-30 LAB
FERRITIN SERPL-MCNC: 99 NG/ML (ref 20–300)
IRON SERPL-MCNC: 202 UG/DL (ref 30–160)
SATURATED IRON: 54 % (ref 20–50)
TOTAL IRON BINDING CAPACITY: 376 UG/DL (ref 250–450)
TRANSFERRIN SERPL-MCNC: 254 MG/DL (ref 200–375)

## 2024-04-30 PROCEDURE — 82728 ASSAY OF FERRITIN: CPT | Performed by: STUDENT IN AN ORGANIZED HEALTH CARE EDUCATION/TRAINING PROGRAM

## 2024-04-30 PROCEDURE — 83540 ASSAY OF IRON: CPT | Performed by: STUDENT IN AN ORGANIZED HEALTH CARE EDUCATION/TRAINING PROGRAM

## 2024-04-30 PROCEDURE — 36415 COLL VENOUS BLD VENIPUNCTURE: CPT | Mod: PO | Performed by: STUDENT IN AN ORGANIZED HEALTH CARE EDUCATION/TRAINING PROGRAM

## 2024-05-02 ENCOUNTER — PATIENT MESSAGE (OUTPATIENT)
Dept: PSYCHIATRY | Facility: CLINIC | Age: 39
End: 2024-05-02
Payer: COMMERCIAL

## 2024-05-06 ENCOUNTER — HOSPITAL ENCOUNTER (OUTPATIENT)
Dept: RADIOLOGY | Facility: HOSPITAL | Age: 39
Discharge: HOME | End: 2024-05-06
Attending: PSYCHIATRY & NEUROLOGY
Payer: COMMERCIAL

## 2024-05-06 DIAGNOSIS — G35 MS (MULTIPLE SCLEROSIS): ICD-10-CM

## 2024-05-06 PROCEDURE — 70551 MRI BRAIN STEM W/O DYE: CPT | Mod: 26,,, | Performed by: RADIOLOGY

## 2024-05-06 PROCEDURE — 70551 MRI BRAIN STEM W/O DYE: CPT | Mod: TC

## 2024-05-08 ENCOUNTER — OFFICE VISIT (OUTPATIENT)
Dept: NEUROLOGY | Facility: CLINIC | Age: 39
End: 2024-05-08
Payer: COMMERCIAL

## 2024-05-08 VITALS
HEART RATE: 64 BPM | SYSTOLIC BLOOD PRESSURE: 101 MMHG | DIASTOLIC BLOOD PRESSURE: 68 MMHG | HEIGHT: 65 IN | BODY MASS INDEX: 21.34 KG/M2 | WEIGHT: 128.06 LBS

## 2024-05-08 DIAGNOSIS — G35 MULTIPLE SCLEROSIS: Primary | ICD-10-CM

## 2024-05-08 DIAGNOSIS — Z71.89 COUNSELING REGARDING GOALS OF CARE: ICD-10-CM

## 2024-05-08 DIAGNOSIS — Z29.89 PROPHYLACTIC IMMUNOTHERAPY: ICD-10-CM

## 2024-05-08 PROCEDURE — 3078F DIAST BP <80 MM HG: CPT | Mod: CPTII,S$GLB,, | Performed by: CLINICAL NURSE SPECIALIST

## 2024-05-08 PROCEDURE — 99214 OFFICE O/P EST MOD 30 MIN: CPT | Mod: S$GLB,,, | Performed by: CLINICAL NURSE SPECIALIST

## 2024-05-08 PROCEDURE — 3008F BODY MASS INDEX DOCD: CPT | Mod: CPTII,S$GLB,, | Performed by: CLINICAL NURSE SPECIALIST

## 2024-05-08 PROCEDURE — 3044F HG A1C LEVEL LT 7.0%: CPT | Mod: CPTII,S$GLB,, | Performed by: CLINICAL NURSE SPECIALIST

## 2024-05-08 PROCEDURE — 1159F MED LIST DOCD IN RCRD: CPT | Mod: CPTII,S$GLB,, | Performed by: CLINICAL NURSE SPECIALIST

## 2024-05-08 PROCEDURE — G2211 COMPLEX E/M VISIT ADD ON: HCPCS | Mod: S$GLB,,, | Performed by: CLINICAL NURSE SPECIALIST

## 2024-05-08 PROCEDURE — 3074F SYST BP LT 130 MM HG: CPT | Mod: CPTII,S$GLB,, | Performed by: CLINICAL NURSE SPECIALIST

## 2024-05-08 PROCEDURE — 99999 PR PBB SHADOW E&M-EST. PATIENT-LVL III: CPT | Mod: PBBFAC,,, | Performed by: CLINICAL NURSE SPECIALIST

## 2024-05-08 RX ORDER — INTERFERON BETA-1A 44 UG/.5ML
44 INJECTION, SOLUTION SUBCUTANEOUS
Qty: 36 PEN | Refills: 0 | Status: ACTIVE | OUTPATIENT
Start: 2024-05-08

## 2024-05-08 RX ORDER — INTERFERON BETA-1A 44 UG/.5ML
44 INJECTION, SOLUTION SUBCUTANEOUS
Qty: 36 PEN | Refills: 0 | Status: SHIPPED | OUTPATIENT
Start: 2024-05-08 | End: 2024-05-08 | Stop reason: SDUPTHER

## 2024-05-08 NOTE — Clinical Note
Hi, Dr. Melgar, I saw King in MS clinic today and reviewed her labs with her. Is there anything different that she needs to do from an anemia standpoint? She's not symptomatic, besides feeling cold a lot. She's seen hematology in the past. Just monitor for now? Thanks for your help!  Margie

## 2024-05-08 NOTE — PROGRESS NOTES
Subjective:          Patient ID: King Perez is a 39 y.o. female who presents today for a routine clinic visit for MS.  She was last seen by Dr. Hernandez in November. The history has been provided by the patient.     MS HPI:  DMT: Rebif   Side effects from DMT? Yes - manageable   Taking vitamin D3 as recommended? Yes -  Dose: 5000 units 4 days a week   She is exercising regularly--yoga, walking, weight training.   She denies any recent infections.   Trazodone has been helpful for sleep when she has taken it--takes it rarely, about twice a month.   Her mood has been better. She is not doing any counseling.   MRI brain this week was stable.    Medications:  Current Outpatient Medications   Medication Sig    B complex-C-E-Zn Tab Take 1 tablet by mouth once daily.    drospirenone-ethinyl estradioL (MARCE) 3-0.02 mg per tablet Take 1 tablet by mouth once daily.    interferon beta-1a, albumin, (REBIF REBIDOSE) 44 mcg/0.5 mL PnIj Inject 0.5 mLs (44 mcg total) into the skin 3 (three) times a week.    rizatriptan (MAXALT-MLT) 5 MG disintegrating tablet Take at onset of migraine, can repeat in 2 hrs if needed.  No more than 2 tabs per day or 3 days/wk.    semaglutide, weight loss, 0.25 mg/0.5 mL PnIj Inject 0.25 mg into the skin every 7 days.    traZODone (DESYREL) 50 MG tablet Take 1 to 1/2 tab po hs    valACYclovir (VALTREX) 1000 MG tablet Take 1 tablet (1,000 mg total) by mouth 2 (two) times daily as needed.    vitamin D 1000 units Tab Take 5,000 Units by mouth once daily.      No current facility-administered medications for this visit.       SOCIAL HISTORY  Social History     Tobacco Use    Smoking status: Never    Smokeless tobacco: Never   Substance Use Topics    Alcohol use: Yes     Comment: socially    Drug use: No       Living arrangements - the patient lives with her      ROS:      5/1/2024     7:37 AM   REVIEW OF SYMPTOMS   Do you feel abnormally tired on most days? No   Do you feel you generally  sleep well? Yes   Do you have difficulty controlling your bladder?  No--felt like she was getting a UTI multiple times    Do you have difficulty controlling your bowels?  No--bowels are regular    Do you have frequent muscle cramps, tightness or spasms in your limbs?  No   Do you have new visual symptoms?  No--sees eye doctor annually    Do you have worsening difficulty with your memory or thinking? No   Do you have worsening symptoms of anxiety or depression?  No--as above    For patients who walk, Do you have more difficulty walking?  Not Applicable   Have you fallen since your last visit?  No   For patients who use wheelchairs: Do you have any skin wounds or breakdown? Not Applicable   Do you have difficulty using your hands?  No   Do you have shooting or burning pain? No   Do you have difficulty with sexual function?  No   If you are sexually active, are you using birth control? Y/N  N/A Yes   Do you often choke when swallowing liquids or solid food?  No   Do you experience worsening symptoms when overheated? No--more sensitive to the cold    Do you need any new equipment such as a wheelchair, walker or shower chair? No   Do you receive co-pay financial assistance for your principal MS medicine? Yes   Would you be interested in participating in an MS research trial in the future? No   For patients on Gilenya, Tecfidera, Aubagio, Rituxan, Ocrevus, Tysabri, Lemtrada or Methotrexate, are you aware that you should NOT receive live virus vaccines?  Not Applicable   Do you feel you have adequate family/friend support?  Yes   Do you have health insurance?   Yes   Are you currently employed? Yes   Do you receive SSDI/SSI?  Not Applicable   Do you use marijuana or cannabis products? Not frequently    How often? Monthly   Have you been diagnosed with a urinary tract infection since your last visit here? No   Have you been diagnosed with a respiratory tract infection since your last visit here? No   Have you been to the  emergency room since your last visit here? No   Have you been hospitalized since your last visit here?  No              Objective:        1. 25 foot timed walk:      2023    12:01 AM 2024    12:01 AM   Timed 25 Foot Walk:   Did patient wear an AFO? No No   Was assistive device used? No No   Time for 25 Foot Walk (seconds) 4.1 3.6   Time for 25 Foot Walk (seconds)  3.8       Neurologic Exam     Mental Status   Oriented to person, place, and time.   Attention: normal. Concentration: normal.   Speech: speech is normal   Level of consciousness: alert  Knowledge: good.   Normal comprehension.     Cranial Nerves     CN III, IV, VI   Extraocular motions are normal.     CN V   Right facial sensation deficit: none  Left facial sensation deficit: none    CN VII   Right facial weakness: none  Left facial weakness: none    CN VIII   Hearing: intact    CN IX, X   Palate: symmetric    CN XI   Right sternocleidomastoid strength: normal  Left sternocleidomastoid strength: normal  Right trapezius strength: normal  Left trapezius strength: normal    CN XII   Tongue deviation: none    Motor Exam     Strength   Right neck flexion: 5/5  Left neck flexion: 5/5  Right neck extension: 5/5  Left neck extension: 5/5  Right deltoid: 5/5  Left deltoid: 5/5  Right biceps: 5/5  Left biceps: 5/5  Right triceps: 5/5  Left triceps: 5/5  Right wrist flexion: 5/5  Left wrist flexion: 5/5  Right wrist extension: 5/5  Left wrist extension: 5/5  Right interossei: 5/5  Left interossei: 5/5  Right iliopsoas: 5/5  Left iliopsoas: 5/5  Right quadriceps: 5/5  Left quadriceps: 5/5  Right hamstrin/5  Left hamstrin/5  Right anterior tibial: 5/5  Left anterior tibial: 5/5  Right gastroc: 5/5  Left gastroc: 5/5    Sensory Exam   Right arm vibration: normal  Left arm vibration: normal  Right leg vibration: normal  Left leg vibration: normal    Gait, Coordination, and Reflexes     Gait  Gait: normal    Coordination   Romberg: negative  Finger to  nose coordination: normal  Heel to shin coordination: normal  Tandem walking coordination: normal    Reflexes   Right brachioradialis: 1+  Left brachioradialis: 1+  Right biceps: 1+  Left biceps: 1+  Right triceps: 1+  Left triceps: 1+  Right patellar: 1+  Left patellar: 1+  Right achilles: 1+  Left achilles: 1+  Normal rapid sequential movements in upper extremities.   She can walk on toes and heels and hop on each foot ten times.          Imaging:     Results for orders placed during the hospital encounter of 05/06/24    MRI Brain Demyelinating Without Contrast    Impression  Brain appears unchanged from prior exam, again demonstrating findings compatible with the reported history of multiple sclerosis.  No new discrete lesions to indicate ongoing demyelination.    Electronically signed by resident: Omar Pickard  Date:    05/06/2024  Time:    11:41    Electronically signed by: Ac Barba MD  Date:    05/06/2024  Time:    12:06        Labs:     Lab Results   Component Value Date    SEZTUWTE64QO 95 04/09/2024    DINSMIEA48KO 67 11/14/2022    IKTKXEGL39EY 81 10/25/2021     Lab Results   Component Value Date    JCVINDEX 1.52 (A) 02/06/2020    JCVANTIBODY Positive (A) 02/06/2020     Lab Results   Component Value Date    IH7SEDRC 73.8 02/06/2020    ABSOLUTECD3 1007.0 02/06/2020    YF0KAIGD 17.1 02/06/2020    ABSOLUTECD8 234.0 02/06/2020    BB8ZKETO 55.7 02/06/2020    ABSOLUTECD4 761.0 02/06/2020    LABCD48 3.25 02/06/2020     Lab Results   Component Value Date    WBC 5.75 04/09/2024    RBC 3.24 (L) 04/09/2024    HGB 11.0 (L) 04/09/2024    HCT 33.2 (L) 04/09/2024     (H) 04/09/2024    MCH 34.0 (H) 04/09/2024    MCHC 33.1 04/09/2024    RDW 12.3 04/09/2024    PLT 58 (L) 04/09/2024    MPV 11.5 04/09/2024    GRAN 3.6 04/09/2024    GRAN 62.5 04/09/2024    LYMPH 1.7 04/09/2024    LYMPH 29.2 04/09/2024    MONO 0.3 04/09/2024    MONO 5.9 04/09/2024    EOS 0.1 04/09/2024    BASO 0.03 04/09/2024    EOSINOPHIL 1.7  04/09/2024    BASOPHIL 0.5 04/09/2024     Sodium   Date Value Ref Range Status   03/26/2024 134 (L) 136 - 145 mmol/L Final     Potassium   Date Value Ref Range Status   03/26/2024 3.9 3.5 - 5.1 mmol/L Final     Chloride   Date Value Ref Range Status   03/26/2024 106 95 - 110 mmol/L Final     CO2   Date Value Ref Range Status   03/26/2024 21 (L) 23 - 29 mmol/L Final     Glucose   Date Value Ref Range Status   03/26/2024 91 70 - 110 mg/dL Final     BUN   Date Value Ref Range Status   03/26/2024 7 6 - 20 mg/dL Final     Creatinine   Date Value Ref Range Status   03/26/2024 0.7 0.5 - 1.4 mg/dL Final     Calcium   Date Value Ref Range Status   03/26/2024 8.9 8.7 - 10.5 mg/dL Final     Total Protein   Date Value Ref Range Status   04/09/2024 6.6 6.0 - 8.4 g/dL Final     Albumin   Date Value Ref Range Status   04/09/2024 3.6 3.5 - 5.2 g/dL Final     Total Bilirubin   Date Value Ref Range Status   04/09/2024 0.4 0.1 - 1.0 mg/dL Final     Comment:     For infants and newborns, interpretation of results should be based  on gestational age, weight and in agreement with clinical  observations.    Premature Infant recommended reference ranges:  Up to 24 hours.............<8.0 mg/dL  Up to 48 hours............<12.0 mg/dL  3-5 days..................<15.0 mg/dL  6-29 days.................<15.0 mg/dL       Alkaline Phosphatase   Date Value Ref Range Status   04/09/2024 32 (L) 55 - 135 U/L Final     AST   Date Value Ref Range Status   04/09/2024 26 10 - 40 U/L Final     ALT   Date Value Ref Range Status   04/09/2024 21 10 - 44 U/L Final     Anion Gap   Date Value Ref Range Status   03/26/2024 7 (L) 8 - 16 mmol/L Final     eGFR if    Date Value Ref Range Status   06/23/2022 >60.0 >60 mL/min/1.73 m^2 Final     eGFR if non    Date Value Ref Range Status   06/23/2022 >60.0 >60 mL/min/1.73 m^2 Final     Comment:     Calculation used to obtain the estimated glomerular filtration  rate (eGFR) is the CKD-EPI  equation.        Lab Results   Component Value Date    HEPBSAG Negative 04/18/2022    HEPBSAB Positive 04/18/2022    HEPBCAB Negative 04/18/2022           MS Impression and Plan:     NEURO MULTIPLE SCLEROSIS IMPRESSION:   Number of relapses in the past year?:  0  Clinical Progression:  Clinically Stable  MRI Progression:  Stable  MS Classification:  Relapsing-Remitting MS  DMT:  No change in management  DMT comment:  Continue Rebif and Vitamin D. Next safety labs are due in October.   Symptom Management:  No change in symptom management     She will follow up with Dr. Hernandez in 6 months.   The visit today is associated with current or anticipated ongoing medical care related to this patient's single serious condition/complex condition of multiple sclerosis.    Total time spent with patient: 21 minutes   Total time spent on encounter: 32 minutes             RADHA Monzon, CNS    Problem List Items Addressed This Visit          Neurologic Problems    Multiple sclerosis - Primary    Relevant Medications    interferon beta-1a, albumin, (REBIF REBIDOSE) 44 mcg/0.5 mL PnIj    Other Relevant Orders    CBC auto differential    Hepatic function panel

## 2024-05-28 NOTE — TELEPHONE ENCOUNTER
Outgoing call to [employer], representative Jenny and Cindy reset the benefits. Copay card claim processing, $0 copay.   

## 2024-06-10 ENCOUNTER — OFFICE VISIT (OUTPATIENT)
Dept: OBSTETRICS AND GYNECOLOGY | Facility: CLINIC | Age: 39
End: 2024-06-10
Payer: COMMERCIAL

## 2024-06-10 VITALS
SYSTOLIC BLOOD PRESSURE: 102 MMHG | WEIGHT: 126.56 LBS | BODY MASS INDEX: 21.06 KG/M2 | DIASTOLIC BLOOD PRESSURE: 75 MMHG

## 2024-06-10 DIAGNOSIS — Z01.419 WELL WOMAN EXAM WITH ROUTINE GYNECOLOGICAL EXAM: Primary | ICD-10-CM

## 2024-06-10 DIAGNOSIS — Z30.41 ENCOUNTER FOR SURVEILLANCE OF CONTRACEPTIVE PILLS: ICD-10-CM

## 2024-06-10 DIAGNOSIS — Z86.69 HISTORY OF MIGRAINE WITH AURA: ICD-10-CM

## 2024-06-10 DIAGNOSIS — Z30.09 BIRTH CONTROL COUNSELING: ICD-10-CM

## 2024-06-10 PROCEDURE — 3074F SYST BP LT 130 MM HG: CPT | Mod: CPTII,S$GLB,, | Performed by: NURSE PRACTITIONER

## 2024-06-10 PROCEDURE — 1159F MED LIST DOCD IN RCRD: CPT | Mod: CPTII,S$GLB,, | Performed by: NURSE PRACTITIONER

## 2024-06-10 PROCEDURE — 3078F DIAST BP <80 MM HG: CPT | Mod: CPTII,S$GLB,, | Performed by: NURSE PRACTITIONER

## 2024-06-10 PROCEDURE — 99999 PR PBB SHADOW E&M-EST. PATIENT-LVL III: CPT | Mod: PBBFAC,,, | Performed by: NURSE PRACTITIONER

## 2024-06-10 PROCEDURE — 99396 PREV VISIT EST AGE 40-64: CPT | Mod: S$GLB,,, | Performed by: NURSE PRACTITIONER

## 2024-06-10 PROCEDURE — 3044F HG A1C LEVEL LT 7.0%: CPT | Mod: CPTII,S$GLB,, | Performed by: NURSE PRACTITIONER

## 2024-06-10 PROCEDURE — 3008F BODY MASS INDEX DOCD: CPT | Mod: CPTII,S$GLB,, | Performed by: NURSE PRACTITIONER

## 2024-06-10 RX ORDER — DROSPIRENONE AND ETHINYL ESTRADIOL 0.02-3(28)
1 KIT ORAL DAILY
Qty: 84 TABLET | Refills: 3 | Status: SHIPPED | OUTPATIENT
Start: 2024-06-10

## 2024-06-10 NOTE — PROGRESS NOTES
CC: Annual  HPI: Pt is a 39 y.o.  female who presents for routine annual exam. She uses cocs for contraception. She does not want STD screening- same partner. Happy with cocs- does not get a period with it and is very compliant. New history of migraines with aura- started in , followed by neuro. Not quite as frequent as before, last one about 2 months ago. Questions about birth control in general. Not having children.    Notes from last visit with me in    CC: Annual  HPI: Pt is a 38 y.o.  female who presents for routine annual exam. She uses ocps for contraception. She does not want STD screening. Recent breast lift and reduction- happy with results, healing well. Needs mammogram this year. Continued with ocps- changed MS meds and has moved on from [facility].   Notes from last visit with me in   CC: Annual  HPI: Pt is a 37 y.o.  female who presents for routine annual exam. She uses ocps for contraception. She does not want STD screening. Planning to [facility] in early May after her next infusion. Plans to continue ocps until then and has a 3 month window she will try for. Curious what her period will do- currently does not get one with pills since she skips placebo pills. On ocps since the age of 16. Already taking a prenatal vitamin.      FH:   Breast cancer: paternal grandmother-dx in her 60s  Colon cancer: none  Ovarian cancer: none  Uterine cancer: none  HPV vaccine: no     Last pap smear:  nilm, hpv negative  History of abnormal pap smears: no  Colonoscopy: na  DEXA: na  Mammogram: scheduled  STD history: no  Birth control: ocp  OB history:   Tobacco use: no   MS, migraines with aura    ROS:  GENERAL: Feeling well overall. Denies fever or chills.   SKIN: Denies rash or lesions.   HEAD: Denies head injury or headache.   NODES: Denies enlarged lymph nodes.   CHEST: Denies chest pain or shortness of breath.   CARDIOVASCULAR: Denies palpitations or left sided chest pain.   ABDOMEN: No abdominal pain,  constipation, diarrhea, nausea, vomiting or rectal bleeding.   URINARY: No dysuria, hematuria, or burning on urination.  REPRODUCTIVE: See HPI.   BREASTS: Denies pain, lumps, or nipple discharge.   HEMATOLOGIC: No easy bruisability or excessive bleeding.   MUSCULOSKELETAL: Denies joint pain or swelling.   NEUROLOGIC: Denies syncope or weakness.   PSYCHIATRIC: Denies depression, anxiety or mood swings.    PE:   APPEARANCE: Well nourished, well developed, White female in no acute distress.  NODES: no cervical, supraclavicular, or inguinal lymphadenopathy  BREASTS: Symmetrical, no skin changes or visible lesions. No palpable masses, nipple discharge or adenopathy bilaterally.  ABDOMEN: Soft. No tenderness or masses. No distention. No hernias palpated. No CVA tenderness.  VULVA: No lesions. Normal external female genitalia.  URETHRAL MEATUS: Normal size and location, no lesions, no prolapse.  URETHRA: No masses, tenderness, or prolapse.  VAGINA: Moist. No lesions or lacerations noted. No abnormal discharge present. No odor present.   CERVIX: No lesions or discharge. No cervical motion tenderness.   UTERUS: Normal size, regular shape, mobile, non-tender.  ADNEXA: No tenderness. No fullness or masses palpated in the adnexal regions.   ANUS PERINEUM: Normal.      Diagnosis:  1. Well woman exam with routine gynecological exam    2. Encounter for surveillance of contraceptive pills    3. Birth control counseling    4. History of migraine with aura        Plan:     Orders Placed This Encounter    drospirenone-ethinyl estradioL (MARCE) 3-0.02 mg per tablet     Pap current  Mammogram current  Discussed hx of migraines with aura and progesterone only methods being preferred. Plans to continue with current pill and consider options. Discussed pop (Slynd does not appear to be covered) and the Mirena IUD.    Patient was counseled today on the new ACS guidelines for cervical cytology screening as well as the current recommendations  for breast cancer screening. She was counseled to follow up with her PCP for other routine health maintenance. Counseling session lasted approximately 10 minutes, and all her questions were answered.  For women over the age of 65, you can stop having cervical cancer screenings if you have never had abnormal cervical cells or cervical cancer, and youve had three negative Pap tests in a row. (You also can stop screening if youve had two negative Pap and HPV tests in a row in the past 10 years, with at least one test in the past 5 years.),    Follow-up with me in 1 year for routine exam; pap in 1 year.     I spent a total of 20 minutes on the day of the visit.This includes face to face time and non-face to face time preparing to see the patient (eg, review of tests), obtaining and/or reviewing separately obtained history, documenting clinical information in the electronic or other health record, independently interpreting results and communicating results to the patient/family/caregiver, or care coordinator.    As of April 1, 2021, the Cures Act has been passed nationally. This new law requires that all doctors progress notes, lab results, pathology reports and radiology reports be released IMMEDIATELY to the patient in the patient portal. That means that the results are released to you at the EXACT same time they are released to me. Therefore, with all of the patients that I have I am not able to reply to each patient exactly when the results come in. So there will be a delay from when you see the results to when I see them and have time to come up with a response to send you. Also I only see these results when I am on the computer at work. So if the results come in over the weekend or after 5 pm of a work day, I will not see them until the next business day. As you can tell, this is a challenge as a provider to give every patient the quick response they hope for and deserve. So please be patient!     Thanks for  your understanding and patience.

## 2024-06-19 ENCOUNTER — OFFICE VISIT (OUTPATIENT)
Dept: OTOLARYNGOLOGY | Facility: CLINIC | Age: 39
End: 2024-06-19
Payer: COMMERCIAL

## 2024-06-19 VITALS
SYSTOLIC BLOOD PRESSURE: 111 MMHG | BODY MASS INDEX: 21.28 KG/M2 | DIASTOLIC BLOOD PRESSURE: 76 MMHG | HEART RATE: 73 BPM | WEIGHT: 127.88 LBS

## 2024-06-19 DIAGNOSIS — H93.12 TINNITUS, LEFT: ICD-10-CM

## 2024-06-19 DIAGNOSIS — M26.652 ARTHROPATHY OF LEFT TEMPOROMANDIBULAR JOINT: Primary | ICD-10-CM

## 2024-06-19 PROCEDURE — 3074F SYST BP LT 130 MM HG: CPT | Mod: CPTII,S$GLB,, | Performed by: PHYSICIAN ASSISTANT

## 2024-06-19 PROCEDURE — 3078F DIAST BP <80 MM HG: CPT | Mod: CPTII,S$GLB,, | Performed by: PHYSICIAN ASSISTANT

## 2024-06-19 PROCEDURE — 99999 PR PBB SHADOW E&M-EST. PATIENT-LVL III: CPT | Mod: PBBFAC,,, | Performed by: PHYSICIAN ASSISTANT

## 2024-06-19 PROCEDURE — 3008F BODY MASS INDEX DOCD: CPT | Mod: CPTII,S$GLB,, | Performed by: PHYSICIAN ASSISTANT

## 2024-06-19 PROCEDURE — 99204 OFFICE O/P NEW MOD 45 MIN: CPT | Mod: S$GLB,,, | Performed by: PHYSICIAN ASSISTANT

## 2024-06-19 PROCEDURE — 3044F HG A1C LEVEL LT 7.0%: CPT | Mod: CPTII,S$GLB,, | Performed by: PHYSICIAN ASSISTANT

## 2024-06-19 RX ORDER — METHOCARBAMOL 500 MG/1
500 TABLET, FILM COATED ORAL 4 TIMES DAILY
Qty: 30 TABLET | Refills: 0 | Status: SHIPPED | OUTPATIENT
Start: 2024-06-19 | End: 2024-06-29

## 2024-06-19 NOTE — PROGRESS NOTES
Subjective:     HPI: King Perez is a 39 y.o. female with MS, migraines, TMJ arthralgia who was self-referred for  jaw pain .    Patient reports a lifelong struggle with TMJ with recent severe episode last month.  Patient reports having a severe left jaw pain that would radiate into her neck.  Associated symptoms included swelling, crackling/popping sound and ringing tinnitus.  She was seen by her dentist underwent x-rays which were inconclusive.  She was told that she had teeth grinding and a mouth guard was ordered but her insurance did not cover.    She was also seen by a chiropractor and treated with some mild improvement.  She also reports having hives from an unclear source around the same time.  She has appointment with allergist next month.    Patient denies any dysphagia and states symptoms significantly improved.    She does not recall previously having allergy testing.  She denies a history of asthma.  She denies a history of reflux symptoms.    She denies a diagnosis of obstructive sleep apnea.   She does not recall a prior history of nasal trauma.  She has not had sinonasal surgery.    She has not had a tonsillectomy.  She is not a tobacco smoker.     Past Medical/Past Surgical History  Past Medical History:   Diagnosis Date    Multiple sclerosis      She has a past surgical history that includes Dilation and curettage of uterus (2014); Cervical biopsy w/ loop electrode excision; and Mastopexy (Bilateral, 04/11/2023).    Family History/Social History  Her family history includes Cancer in her maternal grandmother; Cancer (age of onset: 60) in her paternal grandmother; Hypertension in her mother; No Known Problems in her father.  She reports that she has never smoked. She has never used smokeless tobacco. She reports current alcohol use. She reports that she does not use drugs.    Allergies/Immunizations  She has No Known Allergies.  Immunization History   Administered Date(s) Administered     COVID-19, MRNA, LN-S, PF (MODERNA FULL 0.5 ML DOSE) 02/25/2021, 03/29/2021, 01/03/2022    Hepatitis A, Adult 02/13/2020    Influenza - Quadrivalent - PF *Preferred* (6 months and older) 02/13/2020    Pneumococcal Conjugate - 13 Valent 02/13/2020    Pneumococcal Polysaccharide - 23 Valent 05/01/2020    Tdap 02/13/2020        Medications   B complex-C-E-Zn Tab  drospirenone-ethinyl estradioL  REBIF REBIDOSE Pnij  rizatriptan  semaglutide (weight loss) Pnij  traZODone  valACYclovir  vitamin D Tab     Review of Systems     Constitutional: Negative for appetite change, chills, fatigue, fever and unexpected weight loss.      HENT: Positive for ear pain and facial swelling.      Eyes:  Negative for change in eyesight, eye drainage, eye itching and photophobia.     Respiratory:  Negative for cough, shortness of breath, sleep apnea, snoring and wheezing.      Cardiovascular:  Negative for chest pain, foot swelling, irregular heartbeat and swollen veins.     Gastrointestinal:  Negative for abdominal pain, acid reflux, constipation, diarrhea, heartburn and vomiting.     Genitourinary: Negative for difficulty urinating, sexual problems and frequent urination.     Musc: Positive for aching muscles, back pain and neck pain.     Skin: Positive for rash.     Allergy: Positive for food allergies and seasonal allergies.     Endocrine: Positive for cold intolerance.     Neurological: Positive for headaches.     Hematologic: Negative for bruises/bleeds easily and swollen glands.      Psychiatric: Positive for sleep disturbance.           Objective:     /76 (BP Location: Right arm, Patient Position: Sitting)   Pulse 73   Wt 58 kg (127 lb 13.9 oz)   LMP  (LMP Unknown)   BMI 21.28 kg/m²      Physical Exam  Vitals reviewed.   Constitutional:       Appearance: Normal appearance. She is well-developed.   HENT:      Head: Normocephalic and atraumatic.      Jaw: Tenderness (L TMJ with crepitus) present. No trismus or pain on  "movement.      Right Ear: Tympanic membrane, ear canal and external ear normal.      Left Ear: Tympanic membrane, ear canal and external ear normal.      Nose: No septal deviation, mucosal edema or rhinorrhea.      Right Nostril: No epistaxis.      Left Nostril: No epistaxis.      Right Turbinates: Not enlarged.      Left Turbinates: Not enlarged.      Right Sinus: No maxillary sinus tenderness or frontal sinus tenderness.      Left Sinus: No maxillary sinus tenderness or frontal sinus tenderness.      Mouth/Throat:      Lips: Pink. No lesions.      Tongue: No lesions. Tongue does not deviate from midline.      Palate: No mass and lesions.      Pharynx: Oropharynx is clear. Uvula midline. No pharyngeal swelling, oropharyngeal exudate, posterior oropharyngeal erythema or uvula swelling.      Tonsils: No tonsillar exudate or tonsillar abscesses.   Eyes:      Extraocular Movements: Extraocular movements intact.      Conjunctiva/sclera: Conjunctivae normal.   Neck:      Thyroid: No thyromegaly or thyroid tenderness.   Lymphadenopathy:      Cervical: No cervical adenopathy.   Psychiatric:         Mood and Affect: Mood normal.         Behavior: Behavior normal. Behavior is cooperative.          Procedure    None    Data Reviewed  I personally reviewed the chart, including any outside records, and pertinent data below:    I reviewed the following notes Internal Medicine     WBC (K/uL)   Date Value   04/09/2024 5.75     Eosinophil % (%)   Date Value   04/09/2024 1.7     Eos # (K/uL)   Date Value   04/09/2024 0.1     Platelets (K/uL)   Date Value   04/09/2024 58 (L)     Glucose (mg/dL)   Date Value   03/26/2024 91     No results found for: "IGE"    No sinus imaging available.    Assessment & Plan:     1. Arthropathy of left temporomandibular joint  -    MS and teeth grinding most significant factors  - Encouraged seeking insurance coverege for mouthguard  - Ear exam WNL, no signs of infection.    Discussed with patient the " "etiology of TMJ syndrome and management strategies.    Recommended conservative treatment measures   OTC anti-inflammatories  "Jaw rest" (soft foods, smaller bites, no crunchy foods, gum or ice chewing) x2 weeks    ice pack on jaw joint and stress reduction/ behavioral management.    Follow-up with physical therapy if not improved  -  methocarbamoL (ROBAXIN) 500 MG Tab; Take 1 tablet (500 mg total) by mouth 4 (four) times daily. for 10 days  Dispense: 30 tablet; Refill: 0    2. Tinnitus, left  -       Tinnitus is likely secondary to TMJ inflammation, but if symptoms have not improved discussed undergoing audiogram  - Ambulatory referral/consult to Audiology; Future; Expected date: 06/26/2024    She will follow up as needed  I had a discussion with the patient regarding her condition and the further workup and management options.    All questions were answered, and the patient is in agreement with the above.     Disclaimer:  This note may have been prepared utilizing voice recognition software which may result in occasional typographical errors in the text such as sound alike words.   If further clarification is needed, please contact the ENT department of [facility].  "

## 2024-07-22 ENCOUNTER — LAB VISIT (OUTPATIENT)
Dept: LAB | Facility: HOSPITAL | Age: 39
End: 2024-07-22
Payer: COMMERCIAL

## 2024-07-22 ENCOUNTER — OFFICE VISIT (OUTPATIENT)
Dept: ALLERGY | Facility: CLINIC | Age: 39
End: 2024-07-22
Payer: COMMERCIAL

## 2024-07-22 VITALS — HEIGHT: 65 IN | WEIGHT: 131.81 LBS | BODY MASS INDEX: 21.96 KG/M2

## 2024-07-22 DIAGNOSIS — J31.0 CHRONIC RHINITIS: ICD-10-CM

## 2024-07-22 DIAGNOSIS — J30.2 SEASONAL ALLERGIC RHINITIS, UNSPECIFIED TRIGGER: Primary | ICD-10-CM

## 2024-07-22 PROCEDURE — 99204 OFFICE O/P NEW MOD 45 MIN: CPT | Mod: S$GLB,,, | Performed by: ALLERGY & IMMUNOLOGY

## 2024-07-22 PROCEDURE — 3008F BODY MASS INDEX DOCD: CPT | Mod: CPTII,S$GLB,, | Performed by: ALLERGY & IMMUNOLOGY

## 2024-07-22 PROCEDURE — 86003 ALLG SPEC IGE CRUDE XTRC EA: CPT | Mod: 59 | Performed by: ALLERGY & IMMUNOLOGY

## 2024-07-22 PROCEDURE — 3044F HG A1C LEVEL LT 7.0%: CPT | Mod: CPTII,S$GLB,, | Performed by: ALLERGY & IMMUNOLOGY

## 2024-07-22 PROCEDURE — 36415 COLL VENOUS BLD VENIPUNCTURE: CPT | Performed by: ALLERGY & IMMUNOLOGY

## 2024-07-22 PROCEDURE — 99999 PR PBB SHADOW E&M-EST. PATIENT-LVL III: CPT | Mod: PBBFAC,,, | Performed by: ALLERGY & IMMUNOLOGY

## 2024-07-22 PROCEDURE — 86003 ALLG SPEC IGE CRUDE XTRC EA: CPT | Performed by: ALLERGY & IMMUNOLOGY

## 2024-07-22 PROCEDURE — 1159F MED LIST DOCD IN RCRD: CPT | Mod: CPTII,S$GLB,, | Performed by: ALLERGY & IMMUNOLOGY

## 2024-07-22 RX ORDER — MINERAL OIL
180 ENEMA (ML) RECTAL DAILY
Qty: 30 TABLET | Refills: 11 | Status: SHIPPED | OUTPATIENT
Start: 2024-07-22 | End: 2025-07-22

## 2024-07-22 RX ORDER — FLUTICASONE PROPIONATE 50 MCG
2 SPRAY, SUSPENSION (ML) NASAL DAILY
Qty: 16 G | Refills: 11 | Status: SHIPPED | OUTPATIENT
Start: 2024-07-22

## 2024-07-22 NOTE — PROGRESS NOTES
Subjective:       Patient ID: King Perez is a 39 y.o. female.    Chief Complaint:  Allergic Rhinitis  (Runny nose, sneezing, itchy watery eyes ) and Urticaria      HPI:   Patient's symptoms include cough, itchy eyes, nasal congestion, sneezing, and watery eyes. These symptoms are seasonal. Current triggers include exposure to  being outside . The patient has been suffering from these symptoms for approximately 1 week. The patient has tried  claritin, zyrtec  with good relief of symptoms, but found both sedating. Immunotherapy has never been tried. The patient has never had nasal polyps. The patient has no history of asthma. The patient has no history of eczema. The patient does not suffer from frequent sinopulmonary infections. The patient has not had sinus surgery in the past.     Hives in May x 3 day, generalized. Went to urgent care. No obvious trigger. Maybe after cleaning house. No illness.   No otc meds. Some relief w benadryl.  MS dx'd 2015    Environmental History: Pets in the home: dogs (1), cats (1), and chicken.  [place]: tile or linoleum floors and cement  Tobacco Smoke in Home: no    Past Medical History:   Diagnosis Date    Allergy     Multiple sclerosis     Urticaria          Family History   Problem Relation Name Age of Onset    Hypertension Mother      No Known Problems Father      Cancer Maternal Grandmother          brain tumor - later in life    Cancer Paternal Grandmother  60        breast CA   Dad w recurrent  URI  Mom w poss AR      Review of Systems   Constitutional:  Negative for activity change, fatigue and fever.   HENT:  Positive for congestion. Negative for postnasal drip, rhinorrhea, sinus pressure and sneezing.    Eyes:  Positive for itching. Negative for discharge and redness.   Respiratory:  Negative for cough, shortness of breath and wheezing.    Cardiovascular:  Negative for chest pain.   Gastrointestinal:  Negative for diarrhea, nausea and vomiting.   Genitourinary:   Negative for dysuria.   Musculoskeletal:  Negative for arthralgias and joint swelling.   Skin:  Negative for rash.   Neurological:  Negative for headaches.   Hematological:  Does not bruise/bleed easily.   Psychiatric/Behavioral:  Negative for behavioral problems and sleep disturbance.           Objective:   Physical Exam  Vitals and nursing note reviewed.   Constitutional:       General: She is not in acute distress.     Appearance: She is well-developed.   HENT:      Head: Normocephalic.      Right Ear: Tympanic membrane and external ear normal.      Left Ear: Tympanic membrane and external ear normal.      Nose: No septal deviation, mucosal edema or rhinorrhea.      Right Sinus: No maxillary sinus tenderness or frontal sinus tenderness.      Left Sinus: No maxillary sinus tenderness or frontal sinus tenderness.      Mouth/Throat:      Pharynx: Uvula midline. No uvula swelling.   Eyes:      General:         Right eye: No discharge.         Left eye: No discharge.      Conjunctiva/sclera: Conjunctivae normal.   Cardiovascular:      Rate and Rhythm: Normal rate and regular rhythm.   Pulmonary:      Effort: Pulmonary effort is normal. No respiratory distress.      Breath sounds: Normal breath sounds. No wheezing.   Abdominal:      General: Bowel sounds are normal.      Palpations: Abdomen is soft.      Tenderness: There is no abdominal tenderness.   Musculoskeletal:         General: No tenderness. Normal range of motion.      Cervical back: Normal range of motion and neck supple.   Lymphadenopathy:      Cervical: No cervical adenopathy.   Skin:     General: Skin is warm.      Findings: No erythema or rash.   Neurological:      Mental Status: She is alert and oriented to person, place, and time.   Psychiatric:         Behavior: Behavior normal.         Thought Content: Thought content normal.         Judgment: Judgment normal.             IgG   Date Value Ref Range Status   05/09/2022 1160 650 - 1600 mg/dL Final      "Comment:     IgG Cord Blood Reference Range: 650-1600 mg/dL.   10/25/2021 1255 650 - 1600 mg/dL Final     Comment:     IgG Cord Blood Reference Range: 650-1600 mg/dL.   04/21/2021 1314 650 - 1600 mg/dL Final     Comment:     IgG Cord Blood Reference Range: 650-1600 mg/dL.     IgM   Date Value Ref Range Status   05/09/2022 34 (L) 50 - 300 mg/dL Final     Comment:     IgM Cord Blood Reference Range: <25 mg/dL.   10/25/2021 37 (L) 50 - 300 mg/dL Final     Comment:     IgM Cord Blood Reference Range: <25 mg/dL.   04/21/2021 43 (L) 50 - 300 mg/dL Final     Comment:     IgM Cord Blood Reference Range: <25 mg/dL.     IgA   Date Value Ref Range Status   05/09/2022 113 40 - 350 mg/dL Final     Comment:     IgA Cord Blood Reference Range: <5 mg/dL.   10/25/2021 130 40 - 350 mg/dL Final     Comment:     IgA Cord Blood Reference Range: <5 mg/dL.   04/21/2021 135 40 - 350 mg/dL Final     Comment:     IgA Cord Blood Reference Range: <5 mg/dL.        No results found for: "IGE"    Eos #   Date Value Ref Range Status   04/09/2024 0.1 0.0 - 0.5 K/uL Final   10/09/2023 0.1 0.0 - 0.5 K/uL Final   04/03/2023 0.1 0.0 - 0.5 K/uL Final     Eosinophil %   Date Value Ref Range Status   04/09/2024 1.7 0.0 - 8.0 % Final   10/09/2023 0.7 0.0 - 8.0 % Final   04/03/2023 2.9 0.0 - 8.0 % Final           Assessment:       1. Seasonal allergic rhinitis, unspecified trigger         Plan:       King was seen today for allergic rhinitis  and urticaria.    Diagnoses and all orders for this visit:    Seasonal allergic rhinitis, unspecified trigger  -     Dermatophagoides [place]; Future  -     Dermatophagoides Pteronyssinus; Future  -     [place]; Future  -     Aron; Future  -     [place]; Future  -     English Plantain; Future  -     Oak; Future  -     Pecan; Future  -     Marsh Elder; Future  -     Ragweed; Future  -     Alternaria; Future  -     Aspergillus; Future  -     Cat; Future  -     Dog; Future    Other orders  -     fexofenadine (ALLEGRA) " 180 MG tablet; Take 1 tablet (180 mg total) by mouth once daily.  -     fluticasone propionate (FLONASE) 50 mcg/actuation nasal spray; 2 sprays (100 mcg total) by Each Nostril route once daily.

## 2024-07-25 ENCOUNTER — PATIENT MESSAGE (OUTPATIENT)
Dept: PSYCHIATRY | Facility: CLINIC | Age: 39
End: 2024-07-25
Payer: COMMERCIAL

## 2024-07-25 LAB
A ALTERNATA IGE QN: <0.1 KU/L
A FUMIGATUS IGE QN: <0.1 KU/L
BERMUDA GRASS IGE QN: <0.1 KU/L
CAT DANDER IGE QN: <0.1 KU/L
CEDAR IGE QN: <0.1 KU/L
D FARINAE IGE QN: <0.1 KU/L
D PTERONYSS IGE QN: <0.1 KU/L
DEPRECATED A ALTERNATA IGE RAST QL: NORMAL
DEPRECATED A FUMIGATUS IGE RAST QL: NORMAL
DEPRECATED BERMUDA GRASS IGE RAST QL: NORMAL
DEPRECATED CAT DANDER IGE RAST QL: NORMAL
DEPRECATED CEDAR IGE RAST QL: NORMAL
DEPRECATED D FARINAE IGE RAST QL: NORMAL
DEPRECATED D PTERONYSS IGE RAST QL: NORMAL
DEPRECATED DOG DANDER IGE RAST QL: NORMAL
DEPRECATED ELDER IGE RAST QL: NORMAL
DEPRECATED ENGL PLANTAIN IGE RAST QL: NORMAL
DEPRECATED PECAN/HICK TREE IGE RAST QL: NORMAL
DEPRECATED TIMOTHY IGE RAST QL: NORMAL
DEPRECATED WEST RAGWEED IGE RAST QL: NORMAL
DEPRECATED WHITE OAK IGE RAST QL: NORMAL
DOG DANDER IGE QN: <0.1 KU/L
ELDER IGE QN: <0.1 KU/L
ENGL PLANTAIN IGE QN: <0.1 KU/L
PECAN/HICK TREE IGE QN: <0.1 KU/L
TIMOTHY IGE QN: <0.1 KU/L
WEST RAGWEED IGE QN: <0.1 KU/L
WHITE OAK IGE QN: <0.1 KU/L

## 2024-08-05 ENCOUNTER — PATIENT MESSAGE (OUTPATIENT)
Dept: PSYCHIATRY | Facility: CLINIC | Age: 39
End: 2024-08-05
Payer: COMMERCIAL

## 2024-08-12 ENCOUNTER — PATIENT MESSAGE (OUTPATIENT)
Dept: OBSTETRICS AND GYNECOLOGY | Facility: CLINIC | Age: 39
End: 2024-08-12
Payer: COMMERCIAL

## 2024-08-12 DIAGNOSIS — G35 MULTIPLE SCLEROSIS: ICD-10-CM

## 2024-08-13 RX ORDER — INTERFERON BETA-1A 44 UG/.5ML
44 INJECTION, SOLUTION SUBCUTANEOUS
Qty: 24 PEN | Refills: 0 | Status: ACTIVE | OUTPATIENT
Start: 2024-08-14

## 2024-10-03 ENCOUNTER — PATIENT MESSAGE (OUTPATIENT)
Dept: OBSTETRICS AND GYNECOLOGY | Facility: CLINIC | Age: 39
End: 2024-10-03
Payer: COMMERCIAL

## 2024-10-04 DIAGNOSIS — Z30.011 ENCOUNTER FOR INITIAL PRESCRIPTION OF CONTRACEPTIVE PILLS: Primary | ICD-10-CM

## 2024-10-04 RX ORDER — DROSPIRENONE 4 MG/1
4 TABLET, FILM COATED ORAL DAILY
Qty: 28 TABLET | Refills: 11 | Status: SHIPPED | OUTPATIENT
Start: 2024-10-04

## 2024-10-14 ENCOUNTER — HOSPITAL ENCOUNTER (OUTPATIENT)
Dept: RADIOLOGY | Facility: HOSPITAL | Age: 39
Discharge: HOME | End: 2024-10-14
Attending: STUDENT IN AN ORGANIZED HEALTH CARE EDUCATION/TRAINING PROGRAM
Payer: COMMERCIAL

## 2024-10-14 VITALS — WEIGHT: 131 LBS | HEIGHT: 65 IN | BODY MASS INDEX: 21.83 KG/M2

## 2024-10-14 DIAGNOSIS — Z12.39 ENCOUNTER FOR SCREENING FOR MALIGNANT NEOPLASM OF BREAST, UNSPECIFIED SCREENING MODALITY: ICD-10-CM

## 2024-10-14 PROCEDURE — 77067 SCR MAMMO BI INCL CAD: CPT | Mod: TC

## 2024-10-14 PROCEDURE — 77063 BREAST TOMOSYNTHESIS BI: CPT | Mod: 26,,, | Performed by: RADIOLOGY

## 2024-10-14 PROCEDURE — 77067 SCR MAMMO BI INCL CAD: CPT | Mod: 26,,, | Performed by: RADIOLOGY

## 2024-10-22 ENCOUNTER — PATIENT MESSAGE (OUTPATIENT)
Dept: OBSTETRICS AND GYNECOLOGY | Facility: CLINIC | Age: 39
End: 2024-10-22
Payer: COMMERCIAL

## 2024-10-23 ENCOUNTER — LAB VISIT (OUTPATIENT)
Dept: LAB | Facility: HOSPITAL | Age: 39
End: 2024-10-23
Payer: COMMERCIAL

## 2024-10-23 DIAGNOSIS — G35 MULTIPLE SCLEROSIS: ICD-10-CM

## 2024-10-23 DIAGNOSIS — Z80.3 FAMILY HISTORY OF BREAST CANCER: Primary | ICD-10-CM

## 2024-10-23 LAB
ALBUMIN SERPL BCP-MCNC: 3.8 G/DL (ref 3.5–5.2)
ALP SERPL-CCNC: 41 U/L (ref 40–150)
ALT SERPL W/O P-5'-P-CCNC: 26 U/L (ref 10–44)
AST SERPL-CCNC: 22 U/L (ref 10–40)
BASOPHILS # BLD AUTO: 0.03 K/UL (ref 0–0.2)
BASOPHILS NFR BLD: 0.4 % (ref 0–1.9)
BILIRUB DIRECT SERPL-MCNC: 0.2 MG/DL (ref 0.1–0.3)
BILIRUB SERPL-MCNC: 0.4 MG/DL (ref 0.1–1)
DIFFERENTIAL METHOD BLD: ABNORMAL
EOSINOPHIL # BLD AUTO: 0.1 K/UL (ref 0–0.5)
EOSINOPHIL NFR BLD: 1 % (ref 0–8)
ERYTHROCYTE [DISTWIDTH] IN BLOOD BY AUTOMATED COUNT: 11.8 % (ref 11.5–14.5)
HCT VFR BLD AUTO: 36.7 % (ref 37–48.5)
HGB BLD-MCNC: 12.2 G/DL (ref 12–16)
IMM GRANULOCYTES # BLD AUTO: 0.01 K/UL (ref 0–0.04)
IMM GRANULOCYTES NFR BLD AUTO: 0.1 % (ref 0–0.5)
LYMPHOCYTES # BLD AUTO: 2.6 K/UL (ref 1–4.8)
LYMPHOCYTES NFR BLD: 33.4 % (ref 18–48)
MCH RBC QN AUTO: 34.1 PG (ref 27–31)
MCHC RBC AUTO-ENTMCNC: 33.2 G/DL (ref 32–36)
MCV RBC AUTO: 103 FL (ref 82–98)
MONOCYTES # BLD AUTO: 0.3 K/UL (ref 0.3–1)
MONOCYTES NFR BLD: 3.8 % (ref 4–15)
NEUTROPHILS # BLD AUTO: 4.7 K/UL (ref 1.8–7.7)
NEUTROPHILS NFR BLD: 61.3 % (ref 38–73)
NRBC BLD-RTO: 0 /100 WBC
PLATELET # BLD AUTO: 73 K/UL (ref 150–450)
PMV BLD AUTO: 11.3 FL (ref 9.2–12.9)
PROT SERPL-MCNC: 7 G/DL (ref 6–8.4)
RBC # BLD AUTO: 3.58 M/UL (ref 4–5.4)
WBC # BLD AUTO: 7.66 K/UL (ref 3.9–12.7)

## 2024-10-23 PROCEDURE — 36415 COLL VENOUS BLD VENIPUNCTURE: CPT | Mod: PO | Performed by: CLINICAL NURSE SPECIALIST

## 2024-10-23 PROCEDURE — 80076 HEPATIC FUNCTION PANEL: CPT | Performed by: CLINICAL NURSE SPECIALIST

## 2024-10-23 PROCEDURE — 85025 COMPLETE CBC W/AUTO DIFF WBC: CPT | Performed by: CLINICAL NURSE SPECIALIST

## 2024-10-29 ENCOUNTER — OFFICE VISIT (OUTPATIENT)
Dept: HEMATOLOGY/ONCOLOGY | Facility: CLINIC | Age: 39
End: 2024-10-29
Payer: COMMERCIAL

## 2024-10-29 DIAGNOSIS — Z71.83 ENCOUNTER FOR NONPROCREATIVE GENETIC COUNSELING: Primary | ICD-10-CM

## 2024-10-29 DIAGNOSIS — Z80.3 FAMILY HISTORY OF BREAST CANCER: ICD-10-CM

## 2024-11-04 ENCOUNTER — PATIENT MESSAGE (OUTPATIENT)
Dept: HEMATOLOGY/ONCOLOGY | Facility: CLINIC | Age: 39
End: 2024-11-04
Payer: COMMERCIAL

## 2024-11-04 DIAGNOSIS — G35 MULTIPLE SCLEROSIS: ICD-10-CM

## 2024-11-05 RX ORDER — INTERFERON BETA-1A 44 UG/.5ML
44 INJECTION, SOLUTION SUBCUTANEOUS
Qty: 36 PEN | Refills: 1 | Status: ACTIVE | OUTPATIENT
Start: 2024-11-06

## 2024-11-06 ENCOUNTER — PATIENT MESSAGE (OUTPATIENT)
Dept: PSYCHIATRY | Facility: CLINIC | Age: 39
End: 2024-11-06
Payer: COMMERCIAL

## 2024-11-06 DIAGNOSIS — F32.A DEPRESSION, UNSPECIFIED DEPRESSION TYPE: Primary | ICD-10-CM

## 2024-11-06 DIAGNOSIS — F41.9 ANXIETY: ICD-10-CM

## 2024-11-06 NOTE — PLAN OF CARE
Ocrevus infusion complete. Pt tolerated well. VSS. NAD. IV to left wrist DC'd. Pt verbalized understanding of discharge instructions before leaving with self.    
No

## 2024-11-10 ENCOUNTER — PATIENT MESSAGE (OUTPATIENT)
Dept: PSYCHIATRY | Facility: CLINIC | Age: 39
End: 2024-11-10
Payer: COMMERCIAL

## 2024-11-18 ENCOUNTER — PATIENT MESSAGE (OUTPATIENT)
Dept: NEUROLOGY | Facility: CLINIC | Age: 39
End: 2024-11-18
Payer: COMMERCIAL

## 2024-11-22 ENCOUNTER — PATIENT MESSAGE (OUTPATIENT)
Dept: OBSTETRICS AND GYNECOLOGY | Facility: CLINIC | Age: 39
End: 2024-11-22
Payer: COMMERCIAL

## 2024-11-25 ENCOUNTER — OFFICE VISIT (OUTPATIENT)
Dept: PSYCHIATRY | Facility: CLINIC | Age: 39
End: 2024-11-25
Payer: COMMERCIAL

## 2024-11-25 DIAGNOSIS — F41.1 GENERALIZED ANXIETY DISORDER WITH PANIC ATTACKS: Primary | ICD-10-CM

## 2024-11-25 DIAGNOSIS — F41.0 GENERALIZED ANXIETY DISORDER WITH PANIC ATTACKS: Primary | ICD-10-CM

## 2024-11-25 PROCEDURE — 3044F HG A1C LEVEL LT 7.0%: CPT | Mod: CPTII,95,, | Performed by: SOCIAL WORKER

## 2024-11-25 PROCEDURE — 90791 PSYCH DIAGNOSTIC EVALUATION: CPT | Mod: 95,,, | Performed by: SOCIAL WORKER

## 2024-11-26 NOTE — PROGRESS NOTES
Psychiatry Initial Visit (PhD/LCSW)  Diagnostic Interview - CPT 95745    Date: 11/25/2024    Site: Telemed  The patient location is: home  The chief complaint leading to consultation is: panic attacks, anxiety    Visit type: audiovisual    Face to Face time with patient: 55 mins, 3 - 3:55pm  70 minutes of total time spent on the encounter, which includes face to face time and non-face to face time preparing to see the patient (eg, review of tests), Obtaining and/or reviewing separately obtained history, Documenting clinical information in the electronic or other health record, Independently interpreting results (not separately reported) and communicating results to the patient/family/caregiver, or Care coordination (not separately reported).       Each patient to whom he or she provides medical services by telemedicine is:  (1) informed of the relationship between the physician and patient and the respective role of any other health care provider with respect to management of the patient; and (2) notified that he or she may decline to receive medical services by telemedicine and may withdraw from such care at any time.    Referral source: RADHA Talavera, CNS     Clinical status of patient: Outpatient    King Perez, a 39 y.o. female, for initial evaluation visit.  Met with patient and obtained additional information from available medical records .    Chief complaint/reason for encounter: anxiety and panic attacks    History of present illness: King was last seen by this LCSW in January of 2021.  She resumes counseling for increasing anxiety with panic attacks.  She states these began when she tried to slow down at work (decrease her hours).  She has experienced panic attacks in the past - in her 20s - but not since.  She experiences heart palpitations, difficulty breathing, and feels like she's going to die.  However, she does know these are panic attacks and is able to manage them.  She also shared  that her general/health related anxiety began to increase following her mother's recent cancer diagnosis and her MIL's unexpected death in 2022.  She's also had many appliances breaking in her home.  These events leave here feeling a lack of control.  She reports difficulty relaxing, feeling on edge, feeling like she can't slow down, and having worries that she tries to ignore.  Her anxiety peaked after her MIL's death when she developed a fear of driving.  She noticed feeling terrified to drive and would have panic symptoms and muscle tension.  She was able to work through this with the help of a transcendental meditation.  This past summer she developed hives and also noticed TMJ.      Pain: not evaluated    Symptoms:   Mood: denied  Anxiety: excessive anxiety/worry, restlessness/keyed up, muscle tension, panic attacks, and resolved phobia of driving  Substance abuse: denied  Cognitive functioning: denied  Health behaviors: noncontributory    Psychiatric history: has participated in counseling/psychotherapy on an outpatient basis in the past and prior medication by neurologist and PCP    Medical history: King was diagnosed with multiple sclerosis in 2015. Miscarriage.    Family history of psychiatric illness:  Mother with alcohol abuse following the death of her own mother.  Sober for years.  No other known family history of psychiatric illness.    Social history (marriage, employment, etc.):   From Initial Evaluation in January 2018:  King was born and raised in the [place] area. She is an only child and lived with her parents until they  when she was ~ 16 y.o. Both parents have remarried. King describes a good childhood and close relationships with both parents. 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 She and her  have been  for 12 years. They lived outside [place] for a  "few years but have been living locally for the past 10 years. They do not have children and lost a child to miscarriage about 5 years ago. This was difficult as King describes how, though unexpected, they were excited. She described losing friends during that time as they did not know how to support her through the experience. King and her  have two dogs and enjoying spending a lot of time together. Additionally, King practices yoga and enjoys running. She also enjoys reading and other hobbies but has put them aside since she opened her salon. (She expects her schedule to lighten beginning in March). King was raised Rastafarian but considers herself spiritual and not adherent to any particular Congregation practice. She enjoys journaling.     Current Social History:   Remains  without children.  She still owns her salon and is much happier with the staff and clients she has now.  She continues to enjoy yoga, running, and journaling.  Her MIL  in  and her own mother was diagnosed with breast cancer recently.      Today, she shared more about her teen/young adult life, while her mother was abusing drugs and alcohol.  She states she "numbed herself" and didn't notice any worries or anxieties, but looking back she feels a sense of sadness at how she was living her life and that she didn't realize at that time how much pain she was carrying around.      Substance use:   Alcohol: social   Drugs: none   Tobacco: none   Caffeine: not assessed    Current medications and drug reactions (include OTC, herbal): see medication list     Strengths and liabilities: Strength: Patient accepts guidance/feedback, Strength: Patient is expressive/articulate., Strength: Patient is intelligent., Strength: Patient is motivated for change., Strength: Patient is physically healthy., Strength: Patient has positive support network., Strength: Patient has reasonable judgment., Strength: Patient is stable., Liability: " Patient lacks coping skills.    Current Evaluation:     Mental Status Exam:  General Appearance:  unremarkable, age appropriate   Speech: normal tone, normal rate, normal pitch, normal volume      Level of Cooperation: cooperative      Thought Processes: normal and logical   Mood: anxious      Thought Content: normal, no suicidality, no homicidality, delusions, or paranoia   Affect: congruent and appropriate   Orientation: Oriented x3   Memory: intact   Attention Span & Concentration: intact   Fund of General Knowledge: intact and appropriate to age and level of education   Abstract Reasoning: intact   Judgment & Insight: good     Language  intact     Diagnostic Impression - Plan:       ICD-10-CM ICD-9-CM   1. Generalized anxiety disorder with panic attacks  F41.1 300.02    F41.0 300.01       Plan:individual psychotherapy    Return to Clinic: 1 week    Length of Service (minutes):  55

## 2024-12-02 ENCOUNTER — OFFICE VISIT (OUTPATIENT)
Dept: PSYCHIATRY | Facility: CLINIC | Age: 39
End: 2024-12-02
Payer: COMMERCIAL

## 2024-12-02 DIAGNOSIS — F41.1 GENERALIZED ANXIETY DISORDER WITH PANIC ATTACKS: Primary | ICD-10-CM

## 2024-12-02 DIAGNOSIS — F41.0 GENERALIZED ANXIETY DISORDER WITH PANIC ATTACKS: Primary | ICD-10-CM

## 2024-12-02 PROCEDURE — 90837 PSYTX W PT 60 MINUTES: CPT | Mod: 95,,, | Performed by: SOCIAL WORKER

## 2024-12-02 PROCEDURE — 3044F HG A1C LEVEL LT 7.0%: CPT | Mod: CPTII,95,, | Performed by: SOCIAL WORKER

## 2024-12-02 NOTE — PROGRESS NOTES
Individual Psychotherapy (PhD/LCSW)    12/2/2024    Site:  Telemed       The patient location is: home in [place]  The chief complaint leading to consultation is: anxiety, panic attacks    Visit type: audiovisual    Face to Face time with patient: 53 mins, 3pm - 3:53pm  58 minutes of total time spent on the encounter, which includes face to face time and non-face to face time preparing to see the patient (eg, review of tests), Obtaining and/or reviewing separately obtained history, Documenting clinical information in the electronic or other health record, Independently interpreting results (not separately reported) and communicating results to the patient/family/caregiver, or Care coordination (not separately reported).       Each patient to whom he or she provides medical services by telemedicine is:  (1) informed of the relationship between the physician and patient and the respective role of any other health care provider with respect to management of the patient; and (2) notified that he or she may decline to receive medical services by telemedicine and may withdraw from such care at any time.    Therapeutic Intervention: Met with patient.  Outpatient - Insight oriented psychotherapy 60 min - CPT code 99759     Chief complaint/reason for encounter: anxiety, panic attacks     Interval history and content of current session:   King presented to session neatly dressed and well-groomed.  She had a calm Thanksgiving holiday.  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  Since we last met, she reflected on things she had been holding onto for many years, referring to emotions and thoughts from her past.  She did allow herself time to experience these emotions and felt it was very helpful.  She describes feeling sad when she considers how she handled these emotions as a 18-year-old.  She recognizes she was codependent and wishes she had been able to process her emotions at that age.   When she allowed herself time to experience these emotions she noticed a tendency to stop the process with positive thoughts or reasons she should be grateful. SW identified this as an attempt to avoid the experience and applauded King for recognizing this and continuing to be present with her difficult emotions.      Treatment plan:  Target symptoms: anxiety , panic attacks  Why chosen therapy is appropriate versus another modality: relevant to diagnosis, patient responds to this modality  Outcome monitoring methods: self-report  Therapeutic intervention type: insight oriented psychotherapy    Risk parameters:  Patient reports no suicidal ideation  Patient reports no homicidal ideation  Patient reports no self-injurious behavior  Patient reports no violent behavior    Verbal deficits: None    Patient's response to intervention:  The patient's response to intervention is accepting.    Progress toward goals and other mental status changes:  The patient's progress toward goals is good.    Diagnosis:     ICD-10-CM ICD-9-CM   1. Generalized anxiety disorder with panic attacks  F41.1 300.02    F41.0 300.01       Plan:  individual psychotherapy    Return to clinic: 2 weeks    Length of Service (minutes):  53

## 2024-12-03 DIAGNOSIS — Z30.011 ENCOUNTER FOR INITIAL PRESCRIPTION OF CONTRACEPTIVE PILLS: Primary | ICD-10-CM

## 2024-12-03 RX ORDER — NORETHINDRONE 0.35 MG/1
1 TABLET ORAL DAILY
Qty: 90 TABLET | Refills: 3 | Status: SHIPPED | OUTPATIENT
Start: 2024-12-03 | End: 2025-12-03

## 2024-12-16 ENCOUNTER — OFFICE VISIT (OUTPATIENT)
Dept: PSYCHIATRY | Facility: CLINIC | Age: 39
End: 2024-12-16
Payer: COMMERCIAL

## 2024-12-16 ENCOUNTER — PATIENT MESSAGE (OUTPATIENT)
Dept: PSYCHIATRY | Facility: CLINIC | Age: 39
End: 2024-12-16
Payer: COMMERCIAL

## 2024-12-16 ENCOUNTER — TELEPHONE (OUTPATIENT)
Dept: HEMATOLOGY/ONCOLOGY | Facility: CLINIC | Age: 39
End: 2024-12-16
Payer: COMMERCIAL

## 2024-12-16 DIAGNOSIS — F41.1 GENERALIZED ANXIETY DISORDER WITH PANIC ATTACKS: Primary | ICD-10-CM

## 2024-12-16 DIAGNOSIS — F41.0 GENERALIZED ANXIETY DISORDER WITH PANIC ATTACKS: Primary | ICD-10-CM

## 2024-12-16 PROCEDURE — 3044F HG A1C LEVEL LT 7.0%: CPT | Mod: CPTII,95,, | Performed by: SOCIAL WORKER

## 2024-12-16 PROCEDURE — 90837 PSYTX W PT 60 MINUTES: CPT | Mod: 95,,, | Performed by: SOCIAL WORKER

## 2024-12-16 NOTE — TELEPHONE ENCOUNTER
I spoke to Ms. Malik to follow-up about her mother's genetic test that she shared with me. Her mother's results were negative for actionable findings so there were no gene mutations identified that King could have inherited from her mother that may put her at an increase risk of developing cancer. With this reassuring result and her paternal family history not being suspicious for a hereditary cancer syndrome, it is not medically necessary for Ms. Malik to have her own genetic testing done. With this information, she elected to decline genetic testing for.    LV 4/5/24 WITH LG FOR AWV NV 7/8/24

## 2024-12-16 NOTE — PROGRESS NOTES
Individual Psychotherapy (PhD/LCSW)    12/16/2024    Site:  Telemed       The patient location is: home in [place]  The chief complaint leading to consultation is: anxiety, panic attacks    Visit type: audiovisual    Face to Face time with patient: 56 mins, 5pm - 5:56pm  61 minutes of total time spent on the encounter, which includes face to face time and non-face to face time preparing to see the patient (eg, review of tests), Obtaining and/or reviewing separately obtained history, Documenting clinical information in the electronic or other health record, Independently interpreting results (not separately reported) and communicating results to the patient/family/caregiver, or Care coordination (not separately reported).       Each patient to whom he or she provides medical services by telemedicine is:  (1) informed of the relationship between the physician and patient and the respective role of any other health care provider with respect to management of the patient; and (2) notified that he or she may decline to receive medical services by telemedicine and may withdraw from such care at any time.    Therapeutic Intervention: Met with patient.  Outpatient - Insight oriented psychotherapy 60 min - CPT code 39944     Chief complaint/reason for encounter: anxiety, panic attacks     Interval history and content of current session:   King presented to session neatly dressed and well-groomed.  She worked today after enjoying the day off yesterday.  She states she was able to relax and spent some of the day outside enjoying the good weather.  She states she has been doing pretty well and has been processing things better.  She described this as sitting with an experiencing her emotions when they arise.  She notices most of her intense emotions are connected to past experiences which stir up grief and anger.  Her mother begins chemotherapy this week and she is considering how her role may change depending on her  mother's health.  She expresses annoyance with her father for not seeking help for his own mental health issues.  She explained that her father also lives with anxiety and panic, and she explained that her 1st experience in therapy was because of her father.  Today we ended session with a containment visualization exercise.  Previously, King had been imagining getting rid of her difficult emotions and thoughts - imagining writing them on a piece of paper and then throwing the paper away.  This LCSW explained a healthier way of approaching containment and used a visualization exercise to help her imagine a container that could hold her difficult memories, emotions, and thoughts until she had time to sit with them.  We practiced this in session today.    Treatment plan:  Target symptoms: anxiety , panic attacks  Why chosen therapy is appropriate versus another modality: relevant to diagnosis, patient responds to this modality  Outcome monitoring methods: self-report  Therapeutic intervention type: insight oriented psychotherapy    Risk parameters:  Patient reports no suicidal ideation  Patient reports no homicidal ideation  Patient reports no self-injurious behavior  Patient reports no violent behavior    Verbal deficits: None    Patient's response to intervention:  The patient's response to intervention is accepting.    Progress toward goals and other mental status changes:  The patient's progress toward goals is good.    Diagnosis:     ICD-10-CM ICD-9-CM   1. Generalized anxiety disorder with panic attacks  F41.1 300.02    F41.0 300.01     Plan:  individual psychotherapy    Return to clinic: 3 weeks    Length of Service (minutes):  53

## 2025-01-06 ENCOUNTER — OFFICE VISIT (OUTPATIENT)
Dept: PSYCHIATRY | Facility: CLINIC | Age: 40
End: 2025-01-06
Payer: COMMERCIAL

## 2025-01-06 DIAGNOSIS — F41.0 GENERALIZED ANXIETY DISORDER WITH PANIC ATTACKS: Primary | ICD-10-CM

## 2025-01-06 DIAGNOSIS — F41.1 GENERALIZED ANXIETY DISORDER WITH PANIC ATTACKS: Primary | ICD-10-CM

## 2025-01-06 PROCEDURE — 90837 PSYTX W PT 60 MINUTES: CPT | Mod: 95,,, | Performed by: SOCIAL WORKER
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
## 2025-01-28 ENCOUNTER — OFFICE VISIT (OUTPATIENT)
Dept: PSYCHIATRY | Facility: CLINIC | Age: 40
End: 2025-01-28
Payer: COMMERCIAL

## 2025-01-28 DIAGNOSIS — F41.1 GENERALIZED ANXIETY DISORDER WITH PANIC ATTACKS: Primary | ICD-10-CM

## 2025-01-28 DIAGNOSIS — F41.0 GENERALIZED ANXIETY DISORDER WITH PANIC ATTACKS: Primary | ICD-10-CM

## 2025-01-28 PROCEDURE — 90837 PSYTX W PT 60 MINUTES: CPT | Mod: 95,,, | Performed by: SOCIAL WORKER
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
## 2025-02-05 ENCOUNTER — PATIENT MESSAGE (OUTPATIENT)
Dept: PSYCHIATRY | Facility: CLINIC | Age: 40
End: 2025-02-05
Payer: COMMERCIAL

## 2025-02-05 ENCOUNTER — PATIENT MESSAGE (OUTPATIENT)
Dept: NEUROLOGY | Facility: CLINIC | Age: 40
End: 2025-02-05
Payer: COMMERCIAL

## 2025-02-05 DIAGNOSIS — Z91.89 AT RISK FOR SIDE EFFECT OF MEDICATION: ICD-10-CM

## 2025-02-05 DIAGNOSIS — G35 MULTIPLE SCLEROSIS: Primary | ICD-10-CM

## 2025-02-05 RX ORDER — PREDNISONE 50 MG/1
1250 TABLET ORAL DAILY
Qty: 75 TABLET | Refills: 0 | Status: SHIPPED | OUTPATIENT
Start: 2025-02-05 | End: 2025-02-08

## 2025-02-05 RX ORDER — PANTOPRAZOLE SODIUM 20 MG/1
20 TABLET, DELAYED RELEASE ORAL DAILY
Qty: 7 TABLET | Refills: 0 | Status: SHIPPED | OUTPATIENT
Start: 2025-02-05 | End: 2026-02-05

## 2025-02-06 ENCOUNTER — LAB VISIT (OUTPATIENT)
Dept: LAB | Facility: HOSPITAL | Age: 40
End: 2025-02-06
Payer: COMMERCIAL

## 2025-02-06 ENCOUNTER — TELEPHONE (OUTPATIENT)
Dept: NEUROLOGY | Facility: CLINIC | Age: 40
End: 2025-02-06
Payer: COMMERCIAL

## 2025-02-06 DIAGNOSIS — G35 MULTIPLE SCLEROSIS: ICD-10-CM

## 2025-02-06 LAB
ALBUMIN SERPL BCP-MCNC: 4 G/DL (ref 3.5–5.2)
ALP SERPL-CCNC: 41 U/L (ref 40–150)
ALT SERPL W/O P-5'-P-CCNC: 20 U/L (ref 10–44)
ANION GAP SERPL CALC-SCNC: 10 MMOL/L (ref 8–16)
AST SERPL-CCNC: 17 U/L (ref 10–40)
BASOPHILS # BLD AUTO: 0.03 K/UL (ref 0–0.2)
BASOPHILS NFR BLD: 0.5 % (ref 0–1.9)
BILIRUB SERPL-MCNC: 0.5 MG/DL (ref 0.1–1)
BUN SERPL-MCNC: 8 MG/DL (ref 6–20)
CALCIUM SERPL-MCNC: 9 MG/DL (ref 8.7–10.5)
CHLORIDE SERPL-SCNC: 106 MMOL/L (ref 95–110)
CO2 SERPL-SCNC: 23 MMOL/L (ref 23–29)
CREAT SERPL-MCNC: 0.6 MG/DL (ref 0.5–1.4)
DIFFERENTIAL METHOD BLD: ABNORMAL
EOSINOPHIL # BLD AUTO: 0.1 K/UL (ref 0–0.5)
EOSINOPHIL NFR BLD: 2.2 % (ref 0–8)
ERYTHROCYTE [DISTWIDTH] IN BLOOD BY AUTOMATED COUNT: 11.7 % (ref 11.5–14.5)
EST. GFR  (NO RACE VARIABLE): >60 ML/MIN/1.73 M^2
GLUCOSE SERPL-MCNC: 77 MG/DL (ref 70–110)
HCT VFR BLD AUTO: 35.8 % (ref 37–48.5)
HGB BLD-MCNC: 11.8 G/DL (ref 12–16)
IMM GRANULOCYTES # BLD AUTO: 0.01 K/UL (ref 0–0.04)
IMM GRANULOCYTES NFR BLD AUTO: 0.2 % (ref 0–0.5)
LYMPHOCYTES # BLD AUTO: 1.8 K/UL (ref 1–4.8)
LYMPHOCYTES NFR BLD: 32 % (ref 18–48)
MCH RBC QN AUTO: 33.4 PG (ref 27–31)
MCHC RBC AUTO-ENTMCNC: 33 G/DL (ref 32–36)
MCV RBC AUTO: 101 FL (ref 82–98)
MONOCYTES # BLD AUTO: 0.4 K/UL (ref 0.3–1)
MONOCYTES NFR BLD: 7.7 % (ref 4–15)
NEUTROPHILS # BLD AUTO: 3.1 K/UL (ref 1.8–7.7)
NEUTROPHILS NFR BLD: 57.4 % (ref 38–73)
NRBC BLD-RTO: 0 /100 WBC
PLATELET # BLD AUTO: 99 K/UL (ref 150–450)
PMV BLD AUTO: 11 FL (ref 9.2–12.9)
POTASSIUM SERPL-SCNC: 3.9 MMOL/L (ref 3.5–5.1)
PROT SERPL-MCNC: 6.8 G/DL (ref 6–8.4)
RBC # BLD AUTO: 3.53 M/UL (ref 4–5.4)
SODIUM SERPL-SCNC: 139 MMOL/L (ref 136–145)
WBC # BLD AUTO: 5.47 K/UL (ref 3.9–12.7)

## 2025-02-06 PROCEDURE — 85025 COMPLETE CBC W/AUTO DIFF WBC: CPT | Performed by: CLINICAL NURSE SPECIALIST

## 2025-02-06 PROCEDURE — 36415 COLL VENOUS BLD VENIPUNCTURE: CPT | Mod: PO | Performed by: CLINICAL NURSE SPECIALIST

## 2025-02-06 PROCEDURE — 80053 COMPREHEN METABOLIC PANEL: CPT | Performed by: CLINICAL NURSE SPECIALIST

## 2025-02-07 ENCOUNTER — LAB VISIT (OUTPATIENT)
Dept: LAB | Facility: HOSPITAL | Age: 40
End: 2025-02-07
Payer: COMMERCIAL

## 2025-02-07 DIAGNOSIS — G35 MULTIPLE SCLEROSIS: ICD-10-CM

## 2025-02-07 LAB
BILIRUB UR QL STRIP: NEGATIVE
CLARITY UR REFRACT.AUTO: CLEAR
COLOR UR AUTO: COLORLESS
GLUCOSE UR QL STRIP: NEGATIVE
HGB UR QL STRIP: NEGATIVE
KETONES UR QL STRIP: NEGATIVE
LEUKOCYTE ESTERASE UR QL STRIP: NEGATIVE
NITRITE UR QL STRIP: NEGATIVE
PH UR STRIP: 7 [PH] (ref 5–8)
PROT UR QL STRIP: NEGATIVE
SP GR UR STRIP: 1 (ref 1–1.03)
URN SPEC COLLECT METH UR: ABNORMAL

## 2025-02-07 PROCEDURE — 81003 URINALYSIS AUTO W/O SCOPE: CPT | Performed by: CLINICAL NURSE SPECIALIST

## 2025-02-10 DIAGNOSIS — B00.9 DISEASE OF GINGIVA DUE TO RECURRENT ORAL HERPES SIMPLEX VIRUS (HSV) INFECTION: ICD-10-CM

## 2025-02-10 DIAGNOSIS — K06.9 DISEASE OF GINGIVA DUE TO RECURRENT ORAL HERPES SIMPLEX VIRUS (HSV) INFECTION: ICD-10-CM

## 2025-02-10 RX ORDER — VALACYCLOVIR HYDROCHLORIDE 1 G/1
TABLET, FILM COATED ORAL
Qty: 30 TABLET | Refills: 5 | Status: SHIPPED | OUTPATIENT
Start: 2025-02-10

## 2025-02-12 ENCOUNTER — OFFICE VISIT (OUTPATIENT)
Dept: PSYCHIATRY | Facility: CLINIC | Age: 40
End: 2025-02-12
Payer: COMMERCIAL

## 2025-02-12 DIAGNOSIS — F41.0 GENERALIZED ANXIETY DISORDER WITH PANIC ATTACKS: Primary | ICD-10-CM

## 2025-02-12 DIAGNOSIS — F41.1 GENERALIZED ANXIETY DISORDER WITH PANIC ATTACKS: Primary | ICD-10-CM

## 2025-02-12 NOTE — PROGRESS NOTES
Individual Psychotherapy (PhD/LCSW)    2/12/2025    Site:  Telemed       The patient location is: home  The chief complaint leading to consultation is: anxiety, panic attacks    Visit type: audiovisual    Face to Face time with patient: 55 mins, 11:01am - 11:56am  60 minutes of total time spent on the encounter, which includes face to face time and non-face to face time preparing to see the patient (eg, review of tests), Obtaining and/or reviewing separately obtained history, Documenting clinical information in the electronic or other health record, Independently interpreting results (not separately reported) and communicating results to the patient/family/caregiver, or Care coordination (not separately reported).       Each patient to whom he or she provides medical services by telemedicine is:  (1) informed of the relationship between the physician and patient and the respective role of any other health care provider with respect to management of the patient; and (2) notified that he or she may decline to receive medical services by telemedicine and may withdraw from such care at any time.    Therapeutic Intervention: Met with patient.  Outpatient - Insight oriented psychotherapy 60 min - CPT code 75370     Chief complaint/reason for encounter: anxiety, panic attacks     Interval history and content of current session:   King presented to session neatly dressed and well-groomed.  She states she's doing well and enjoying a day off.  She plans to relax with her  after our session.  King believes she experienced and MS relapse or pseudorelapse last week.  Her cognition was cloudy and she noticed numbness and tingling in her body.  She was prescribed steroids and looks forward to seeing results of her next MRI.  Today, we followed up on King's exploration of guilt and anger.  She shared that she even became angry at herself and guilted herself for the MS flare up.  We discussed how in relationships she  "often takes responsibility for other's feelings, meaning she feels responsible if she expresses anger (even in a healthy way) and a friend or other person "feels bad" or has a difficult day because of that.  We discussed the difference between experiencing an emotion and acting on an emotion.  How can she acknowledge anger/irritation and use the energy behind the emotion to advocate for herself and others, solve problems, etc versus reacting to the emotion?  SW encouraged her to spend time with anger when it surfaces and imagine it as a messenger trying to tell her something (she needs a stronger boundary, she needs to set a limit, etc).      Treatment plan:  Target symptoms: anxiety , panic attacks  Why chosen therapy is appropriate versus another modality: relevant to diagnosis, patient responds to this modality  Outcome monitoring methods: self-report  Therapeutic intervention type: insight oriented psychotherapy    Risk parameters:  Patient reports no suicidal ideation  Patient reports no homicidal ideation  Patient reports no self-injurious behavior  Patient reports no violent behavior    Verbal deficits: None    Patient's response to intervention:  The patient's response to intervention is accepting.    Progress toward goals and other mental status changes:  The patient's progress toward goals is good.    Diagnosis:     ICD-10-CM ICD-9-CM   1. Generalized anxiety disorder with panic attacks  F41.1 300.02    F41.0 300.01       Plan:  individual psychotherapy    Return to clinic: 3 weeks    Length of Service (minutes):  55                "

## 2025-03-05 ENCOUNTER — PATIENT MESSAGE (OUTPATIENT)
Dept: PSYCHIATRY | Facility: CLINIC | Age: 40
End: 2025-03-05
Payer: COMMERCIAL

## 2025-03-07 ENCOUNTER — PATIENT MESSAGE (OUTPATIENT)
Dept: PSYCHIATRY | Facility: CLINIC | Age: 40
End: 2025-03-07
Payer: COMMERCIAL

## 2025-03-14 ENCOUNTER — PATIENT MESSAGE (OUTPATIENT)
Dept: PSYCHIATRY | Facility: CLINIC | Age: 40
End: 2025-03-14
Payer: COMMERCIAL

## 2025-03-15 ENCOUNTER — PATIENT MESSAGE (OUTPATIENT)
Dept: NEUROLOGY | Facility: CLINIC | Age: 40
End: 2025-03-15
Payer: COMMERCIAL

## 2025-03-15 DIAGNOSIS — G35 MULTIPLE SCLEROSIS: Primary | ICD-10-CM

## 2025-03-18 ENCOUNTER — OFFICE VISIT (OUTPATIENT)
Dept: PSYCHIATRY | Facility: CLINIC | Age: 40
End: 2025-03-18
Payer: COMMERCIAL

## 2025-03-18 DIAGNOSIS — F41.0 GENERALIZED ANXIETY DISORDER WITH PANIC ATTACKS: Primary | ICD-10-CM

## 2025-03-18 DIAGNOSIS — F41.1 GENERALIZED ANXIETY DISORDER WITH PANIC ATTACKS: Primary | ICD-10-CM

## 2025-03-18 PROCEDURE — 90837 PSYTX W PT 60 MINUTES: CPT | Mod: 95,,, | Performed by: SOCIAL WORKER
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
## 2025-03-24 ENCOUNTER — HOSPITAL ENCOUNTER (OUTPATIENT)
Dept: RADIOLOGY | Facility: OTHER | Age: 40
Discharge: HOME | End: 2025-03-24
Attending: CLINICAL NURSE SPECIALIST
Payer: COMMERCIAL

## 2025-03-24 ENCOUNTER — OFFICE VISIT (OUTPATIENT)
Dept: PRIMARY CARE CLINIC | Facility: CLINIC | Age: 40
End: 2025-03-24
Payer: COMMERCIAL

## 2025-03-24 VITALS
RESPIRATION RATE: 19 BRPM | BODY MASS INDEX: 20.97 KG/M2 | HEIGHT: 65 IN | HEART RATE: 70 BPM | OXYGEN SATURATION: 96 % | DIASTOLIC BLOOD PRESSURE: 78 MMHG | WEIGHT: 125.88 LBS | SYSTOLIC BLOOD PRESSURE: 116 MMHG

## 2025-03-24 DIAGNOSIS — G35 MULTIPLE SCLEROSIS: ICD-10-CM

## 2025-03-24 DIAGNOSIS — Z00.00 ANNUAL PHYSICAL EXAM: Primary | ICD-10-CM

## 2025-03-24 DIAGNOSIS — K59.00 CONSTIPATION, UNSPECIFIED CONSTIPATION TYPE: ICD-10-CM

## 2025-03-24 PROCEDURE — 99396 PREV VISIT EST AGE 40-64: CPT | Mod: S$GLB,,, | Performed by: STUDENT IN AN ORGANIZED HEALTH CARE EDUCATION/TRAINING PROGRAM

## 2025-03-24 PROCEDURE — 25500020 PHARM REV CODE 255: Performed by: CLINICAL NURSE SPECIALIST

## 2025-03-24 PROCEDURE — 72157 MRI CHEST SPINE W/O & W/DYE: CPT | Mod: TC

## 2025-03-24 PROCEDURE — 70553 MRI BRAIN STEM W/O & W/DYE: CPT | Mod: TC

## 2025-03-24 PROCEDURE — 1159F MED LIST DOCD IN RCRD: CPT | Mod: CPTII,S$GLB,, | Performed by: STUDENT IN AN ORGANIZED HEALTH CARE EDUCATION/TRAINING PROGRAM

## 2025-03-24 PROCEDURE — 1160F RVW MEDS BY RX/DR IN RCRD: CPT | Mod: CPTII,S$GLB,, | Performed by: STUDENT IN AN ORGANIZED HEALTH CARE EDUCATION/TRAINING PROGRAM

## 2025-03-24 PROCEDURE — 72156 MRI NECK SPINE W/O & W/DYE: CPT | Mod: TC

## 2025-03-24 PROCEDURE — 3008F BODY MASS INDEX DOCD: CPT | Mod: CPTII,S$GLB,, | Performed by: STUDENT IN AN ORGANIZED HEALTH CARE EDUCATION/TRAINING PROGRAM

## 2025-03-24 PROCEDURE — 3078F DIAST BP <80 MM HG: CPT | Mod: CPTII,S$GLB,, | Performed by: STUDENT IN AN ORGANIZED HEALTH CARE EDUCATION/TRAINING PROGRAM

## 2025-03-24 PROCEDURE — A9585 GADOBUTROL INJECTION: HCPCS | Performed by: CLINICAL NURSE SPECIALIST

## 2025-03-24 PROCEDURE — 3074F SYST BP LT 130 MM HG: CPT | Mod: CPTII,S$GLB,, | Performed by: STUDENT IN AN ORGANIZED HEALTH CARE EDUCATION/TRAINING PROGRAM

## 2025-03-24 PROCEDURE — 70553 MRI BRAIN STEM W/O & W/DYE: CPT | Mod: 26,,, | Performed by: RADIOLOGY

## 2025-03-24 PROCEDURE — 99999 PR PBB SHADOW E&M-EST. PATIENT-LVL IV: CPT | Mod: PBBFAC,,, | Performed by: STUDENT IN AN ORGANIZED HEALTH CARE EDUCATION/TRAINING PROGRAM

## 2025-03-24 PROCEDURE — 72156 MRI NECK SPINE W/O & W/DYE: CPT | Mod: 26,,, | Performed by: RADIOLOGY

## 2025-03-24 PROCEDURE — 72157 MRI CHEST SPINE W/O & W/DYE: CPT | Mod: 26,,, | Performed by: RADIOLOGY

## 2025-03-24 RX ORDER — GADOBUTROL 604.72 MG/ML
6 INJECTION INTRAVENOUS
Status: COMPLETED | OUTPATIENT
Start: 2025-03-24 | End: 2025-03-24

## 2025-03-24 RX ADMIN — GADOBUTROL 6 ML: 604.72 INJECTION INTRAVENOUS at 04:03

## 2025-03-24 NOTE — PROGRESS NOTES
Office visit  Patient: King Perez   3/24/2025       Assessment/Plan:       1. Annual physical exam        -recent labs reviewed, unremarkable        -Discussed healthy habits and recommended preventative screening    2. Multiple sclerosis       -stable, continue current medication       -follow up with neurology    3. Constipation, unspecified constipation type  -     Ambulatory referral/consult to Gastroenterology; Future; Expected date: 03/31/2025        -discussed increasing fiber, OTC laxatives; will refer to GI for consideration of early colonoscopy      Return to clinic in 1 year or sooner as needed.          CHIEF COMPLAINT: annual physical    HPI: King Perez is a 40 y.o. female who presents for an annual physical.     She reports ongoing issues with constipation/blood in stool over the past year.  This comes and goes.  The blood is on the toilet paper when she wipes.  No pain with bowel movements.  Sometimes she has left-sided abdominal pain.  She started adding fiber to her coffee, which helped.    She's been on semaglutide - microdose through a [employer]      Current Outpatient Medications   Medication Instructions    B complex-C-E-Zn Tab 1 tablet, Daily    fexofenadine (ALLEGRA) 180 mg, Oral, Daily    fluticasone propionate (FLONASE) 100 mcg, Each Nostril, Daily    interferon beta-1a, albumin, (REBIF REBIDOSE) 44 mcg/0.5 mL PnIj 44 mcg, Subcutaneous, Three times weekly    norethindrone (MICRONOR) 0.35 mg, Oral, Daily    rizatriptan (MAXALT-MLT) 5 MG disintegrating tablet Take at onset of migraine, can repeat in 2 hrs if needed.  No more than 2 tabs per day or 3 days/wk.    semaglutide (weight loss) 0.25 mg, Every 7 days    traZODone (DESYREL) 50 MG tablet Take 1 to 1/2 tab po hs    valACYclovir (VALTREX) 1000 MG tablet TAKE 1 TABLET(1000 MG) BY MOUTH TWICE DAILY AS NEEDED    vitamin D (VITAMIN D3) 5,000 Units, Daily       Lab Results   Component Value Date    HGBA1C 4.7 03/26/2024    HGBA1C  "5.0 03/21/2019    HGBA1C 4.9 02/21/2015     No results found for: "MICALBCREAT"  Lab Results   Component Value Date    LDLCALC 61.2 (L) 03/26/2024    LDLCALC 63.0 01/11/2021    CHOL 166 03/26/2024    HDL 81 (H) 03/26/2024    TRIG 119 03/26/2024       Lab Results   Component Value Date     02/06/2025    K 3.9 02/06/2025     02/06/2025    CO2 23 02/06/2025    GLU 77 02/06/2025    BUN 8 02/06/2025    CREATININE 0.6 02/06/2025    CALCIUM 9.0 02/06/2025    PROT 6.8 02/06/2025    ALBUMIN 4.0 02/06/2025    BILITOT 0.5 02/06/2025    ALKPHOS 41 02/06/2025    AST 17 02/06/2025    ALT 20 02/06/2025    ANIONGAP 10 02/06/2025    ESTGFRAFRICA >60.0 06/23/2022    EGFRNONAA >60.0 06/23/2022    WBC 5.47 02/06/2025    HGB 11.8 (L) 02/06/2025    HGB 12.2 10/23/2024    HCT 35.8 (L) 02/06/2025     (H) 02/06/2025    PLT 99 (L) 02/06/2025    TSH 1.159 03/26/2024    HEPCAB Negative 02/06/2020       Lab Results   Component Value Date    HYPHVPWH24PD 95 04/09/2024    TANRELPK83YT 67 11/14/2022    EZYYWJBO16 573 01/11/2021    FERRITIN 99 04/30/2024    IRON 202 (H) 04/30/2024    TRANSFERRIN 254 04/30/2024    TIBC 376 04/30/2024    FESATURATED 54 (H) 04/30/2024         Past Medical History:   Diagnosis Date    Allergy     Anemia 2017    Food allergy 5/20    Migraine headache 2022    Multiple sclerosis     Urticaria      Past Surgical History:   Procedure Laterality Date    BREAST RECONSTRUCTION Bilateral 04/2023    lift    CERVICAL BIOPSY  W/ LOOP ELECTRODE EXCISION      COSMETIC SURGERY  2023    Breast lift    DILATION AND CURETTAGE OF UTERUS  2014    EYE SURGERY  1985    Lazy eye    MASTOPEXY Bilateral 04/11/2023    Procedure: MASTOPEXY;  Surgeon: Miguel Rodriguez Jr., MD;  Location: [facility];  Service: Plastics;  Laterality: Bilateral;  3HRS       Social History[1]    family history includes Alcohol abuse in her mother; Breast cancer (age of onset: 60 - 69) in her paternal grandmother; Breast cancer (age of onset: 68) in " "her mother; Cancer in her maternal grandmother and paternal grandmother; Hypertension in her mother; Lymphoma (age of onset: 70 - 79) in her paternal grandmother; No Known Problems in her father; Stroke in her maternal grandmother.    Vitals:    03/24/25 1246   BP: 116/78   Pulse: 70   Resp: 19   SpO2: 96%   Weight: 57.1 kg (125 lb 14.1 oz)   Height: 5' 5" (1.651 m)   PainSc: 0-No pain       Body mass index is 20.95 kg/m².      Objective:   Physical Exam  Vitals reviewed.   Constitutional:       General: She is not in acute distress.     Appearance: Normal appearance. She is well-developed.   HENT:      Head: Normocephalic and atraumatic.      Right Ear: External ear normal.      Left Ear: External ear normal.      Nose: Nose normal.      Mouth/Throat:      Mouth: Mucous membranes are moist.   Eyes:      General: No scleral icterus.        Right eye: No discharge.         Left eye: No discharge.      Conjunctiva/sclera: Conjunctivae normal.   Cardiovascular:      Rate and Rhythm: Normal rate and regular rhythm.      Heart sounds: Normal heart sounds. No murmur heard.     No friction rub. No gallop.   Pulmonary:      Effort: Pulmonary effort is normal. No respiratory distress.      Breath sounds: Normal breath sounds. No wheezing, rhonchi or rales.   Abdominal:      General: Abdomen is flat. Bowel sounds are normal. There is no distension.      Palpations: Abdomen is soft. There is no mass.      Tenderness: There is no abdominal tenderness. There is no guarding or rebound.      Hernia: No hernia is present.   Musculoskeletal:         General: No deformity.      Right lower leg: No edema.      Left lower leg: No edema.   Skin:     General: Skin is warm and dry.   Neurological:      General: No focal deficit present.      Mental Status: She is alert and oriented to person, place, and time.   Psychiatric:         Mood and Affect: Mood normal.         Behavior: Behavior normal.         Thought Content: Thought content " normal.             Angelina Melgar MD  Internal Medicine and Pediatrics                                 [1]   Social History  Tobacco Use    Smoking status: Never     Passive exposure: Never    Smokeless tobacco: Never   Substance Use Topics    Alcohol use: Yes     Alcohol/week: 4.0 standard drinks of alcohol     Types: 4 Glasses of wine per week     Comment: socially    Drug use: Yes     Types: Marijuana

## 2025-03-25 ENCOUNTER — HOSPITAL ENCOUNTER (OUTPATIENT)
Dept: RADIOLOGY | Facility: HOSPITAL | Age: 40
Discharge: HOME | End: 2025-03-25
Attending: CLINICAL NURSE SPECIALIST
Payer: COMMERCIAL

## 2025-03-25 ENCOUNTER — OFFICE VISIT (OUTPATIENT)
Dept: NEUROLOGY | Facility: CLINIC | Age: 40
End: 2025-03-25
Payer: COMMERCIAL

## 2025-03-25 VITALS
BODY MASS INDEX: 20.95 KG/M2 | HEIGHT: 65 IN | SYSTOLIC BLOOD PRESSURE: 105 MMHG | HEART RATE: 60 BPM | WEIGHT: 125.75 LBS | DIASTOLIC BLOOD PRESSURE: 70 MMHG

## 2025-03-25 DIAGNOSIS — G47.00 INSOMNIA, UNSPECIFIED TYPE: ICD-10-CM

## 2025-03-25 DIAGNOSIS — R93.2 ABNORMAL LIVER DIAGNOSTIC IMAGING: ICD-10-CM

## 2025-03-25 DIAGNOSIS — Z71.89 COUNSELING REGARDING GOALS OF CARE: ICD-10-CM

## 2025-03-25 DIAGNOSIS — Z29.89 PROPHYLACTIC IMMUNOTHERAPY: ICD-10-CM

## 2025-03-25 DIAGNOSIS — G35 MULTIPLE SCLEROSIS: Primary | ICD-10-CM

## 2025-03-25 PROCEDURE — 3008F BODY MASS INDEX DOCD: CPT | Mod: CPTII,S$GLB,, | Performed by: CLINICAL NURSE SPECIALIST

## 2025-03-25 PROCEDURE — 1159F MED LIST DOCD IN RCRD: CPT | Mod: CPTII,S$GLB,, | Performed by: CLINICAL NURSE SPECIALIST

## 2025-03-25 PROCEDURE — 3078F DIAST BP <80 MM HG: CPT | Mod: CPTII,S$GLB,, | Performed by: CLINICAL NURSE SPECIALIST

## 2025-03-25 PROCEDURE — 76705 ECHO EXAM OF ABDOMEN: CPT | Mod: TC

## 2025-03-25 PROCEDURE — 3074F SYST BP LT 130 MM HG: CPT | Mod: CPTII,S$GLB,, | Performed by: CLINICAL NURSE SPECIALIST

## 2025-03-25 PROCEDURE — G2211 COMPLEX E/M VISIT ADD ON: HCPCS | Mod: S$GLB,,, | Performed by: CLINICAL NURSE SPECIALIST

## 2025-03-25 PROCEDURE — 99999 PR PBB SHADOW E&M-EST. PATIENT-LVL III: CPT | Mod: PBBFAC,,, | Performed by: CLINICAL NURSE SPECIALIST

## 2025-03-25 PROCEDURE — 76705 ECHO EXAM OF ABDOMEN: CPT | Mod: 26,,, | Performed by: STUDENT IN AN ORGANIZED HEALTH CARE EDUCATION/TRAINING PROGRAM

## 2025-03-25 PROCEDURE — 99215 OFFICE O/P EST HI 40 MIN: CPT | Mod: S$GLB,,, | Performed by: CLINICAL NURSE SPECIALIST

## 2025-03-25 RX ORDER — TRAZODONE HYDROCHLORIDE 50 MG/1
TABLET ORAL
Qty: 30 TABLET | Refills: 11 | Status: SHIPPED | OUTPATIENT
Start: 2025-03-25

## 2025-03-25 NOTE — PROGRESS NOTES
Subjective:          Patient ID: King Perez is a 40 y.o. female who presents today for a routine clinic visit for MS.  She was last seen in May 2024. The history has been provided by the patient.       MS HPI:  DMT: Rebif--missed 1 dose recently, but made it up; otherwise adherent.   Side effects from DMT? No  Taking vitamin D3 as recommended? Yes -  Dose:   She believes she had a relapse in early February with painful tingling/sharp pain and numbness to the left side, mostly mid back to lower body, especially around her knee, but including foot, upper thigh/pelvis. She also noticed it in her handwriting. She is right-handed. The symptoms did not otherwise impair her function and were intermittent, worse in the morning and improved later in the day. She reports that her  told her she was more lethargic about a day or so prior to the onset of symptoms. She recalls taking a 3 hour nap that day. She had a mild UTI a few weeks prior to onset of symptoms.  She was treated with 3 days of oral steroids. She feels like the symptoms are resolved now.   She denies any recent infections besides UTI.     Medications:  Current Outpatient Medications   Medication Sig    B complex-C-E-Zn Tab Take 1 tablet by mouth once daily.    fexofenadine (ALLEGRA) 180 MG tablet Take 1 tablet (180 mg total) by mouth once daily.    fluticasone propionate (FLONASE) 50 mcg/actuation nasal spray 2 sprays (100 mcg total) by Each Nostril route once daily.    interferon beta-1a, albumin, (REBIF REBIDOSE) 44 mcg/0.5 mL PnIj Inject 0.5 mLs (44 mcg total) into the skin 3 (three) times a week.    norethindrone (MICRONOR) 0.35 mg tablet Take 1 tablet (0.35 mg total) by mouth once daily.    rizatriptan (MAXALT-MLT) 5 MG disintegrating tablet Take at onset of migraine, can repeat in 2 hrs if needed.  No more than 2 tabs per day or 3 days/wk.    semaglutide, weight loss, 0.25 mg/0.5 mL PnIj Inject 0.25 mg into the skin every 7 days.     traZODone (DESYREL) 50 MG tablet Take 1 to 1/2 tab po hs    valACYclovir (VALTREX) 1000 MG tablet TAKE 1 TABLET(1000 MG) BY MOUTH TWICE DAILY AS NEEDED    vitamin D 1000 units Tab Take 5,000 Units by mouth once daily.      No current facility-administered medications for this visit.       SOCIAL HISTORY  Social History[1]    Living arrangements - the patient lives with her      ROS:      3/18/2025    10:16 AM   REVIEW OF SYMPTOMS   Do you feel abnormally tired on most days? No   Do you feel you generally sleep well? Yes--plans to restart trazodone and requests refill today    Do you have difficulty controlling your bladder?  No   Do you have difficulty controlling your bowels?  No   Do you have frequent muscle cramps, tightness or spasms in your limbs?  No--annoying, not painful; manageable.    Do you have new visual symptoms?  Yes--has more floaters; saw eye doctor recently    Do you have worsening difficulty with your memory or thinking? No   Do you have worsening symptoms of anxiety or depression?  No--has had more anxiety, but is seeing a therapist    For patients who walk, Do you have more difficulty walking?  Not Applicable   Have you fallen since your last visit?  No   For patients who use wheelchairs: Do you have any skin wounds or breakdown? Not Applicable   Do you have difficulty using your hands?  No   Do you have shooting or burning pain? No   Do you have difficulty with sexual function?  Yes--libido has been lower since she switched to progesterone birth control    If you are sexually active, are you using birth control? Y/N  N/A Yes   Do you often choke when swallowing liquids or solid food?  No   Do you experience worsening symptoms when overheated? No   Do you need any new equipment such as a wheelchair, walker or shower chair? No   Do you receive co-pay financial assistance for your principal MS medicine? Yes   Would you be interested in participating in an MS research trial in the future? No    For patients on Gilenya, Tecfidera, Aubagio, Rituxan, Ocrevus, Tysabri, Lemtrada or Methotrexate, are you aware that you should NOT receive live virus vaccines?  Not Applicable   Do you feel you have adequate family/friend support?  Yes   Do you have health insurance?   Yes   Are you currently employed? Yes   Do you receive SSDI/SSI?  Not Applicable   Do you use marijuana or cannabis products? Yes   How often? Weekly   Have you been diagnosed with a urinary tract infection since your last visit here? No   Have you been diagnosed with a respiratory tract infection since your last visit here? No   Have you been to the emergency room since your last visit here? No   Have you been hospitalized since your last visit here?  No            Objective:        1. 25 foot timed walk:      11/27/2023    11:40 AM 5/8/2024    11:30 AM   Timed 25 Foot Walk:   Did patient wear an AFO? No No   Was assistive device used? No No   Time for 25 Foot Walk (seconds) 4.1 3.6   Time for 25 Foot Walk (seconds)  3.8     Neurological Exam    Mental Status   Oriented to person, place, and time.   Attention: normal. Concentration: normal.   Speech: speech is normal   Level of consciousness: alert  Knowledge: good.   Normal comprehension.      Cranial Nerves      CN III, IV, VI   Extraocular motions are normal.   Visual acuity 20/20 OD and OS with contacts      CN V   Right facial sensation deficit: none  Left facial sensation deficit: none     CN VII   Right facial weakness: none  Left facial weakness: none     CN VIII   Hearing: intact     CN IX, X   Palate: symmetric     CN XI   Right sternocleidomastoid strength: normal  Left sternocleidomastoid strength: normal  Right trapezius strength: normal  Left trapezius strength: normal     CN XII   Tongue deviation: none     Motor Exam      Strength   Right neck flexion: 5/5  Left neck flexion: 5/5  Right neck extension: 5/5  Left neck extension: 5/5  Right deltoid: 5/5  Left deltoid: 5/5  Right  biceps: 5/5  Left biceps: 5/5  Right triceps: 5/5  Left triceps: 5/5  Right wrist flexion: 5/5  Left wrist flexion: 5/5  Right wrist extension: 5/5  Left wrist extension: 5/5  Right interossei: 5/5  Left interossei: 5/5  Right iliopsoas: 5/5  Left iliopsoas: 5/5  Right quadriceps: 5/5  Left quadriceps: 5/5  Right hamstrin/5  Left hamstrin/5  Right anterior tibial: 5/5  Left anterior tibial: 5/5  Right gastroc: 5/5  Left gastroc: 5/5     Sensory Exam   Right arm vibration: normal  Left arm vibration: normal  Right leg vibration: normal  Left leg vibration: normal     Gait, Coordination, and Reflexes      Gait  Gait: normal     Coordination   Romberg: negative  Finger to nose coordination: normal  Heel to shin coordination: normal  Tandem walking coordination: normal     Reflexes   Right brachioradialis: 1+  Left brachioradialis: 1+  Right biceps: 1+  Left biceps: 1+  Right triceps: 1+  Left triceps: 1+  Right patellar: 1+  Left patellar: 1+  Right achilles: 1+  Left achilles: 1+  Normal rapid sequential movements in upper extremities.   She can walk on toes and heels and hop on each foot ten times.       Imaging:   EXAMINATION:  MRI BRAIN DEMYELINATING W/ WO CONTRAST     CLINICAL HISTORY:  Multiple sclerosis     TECHNIQUE:  Multiplanar multisequence MR imaging of the brain was performed before and after the administration of 6 mL of Gadavist intravenous contrast.  Demyelinating protocol.     COMPARISON:  2024     FINDINGS:  Intracranial Compartment:     Ventricles are stable in size.  No hydrocephalus.     The brain parenchyma appears unchanged.  Several small T2/FLAIR hyperintense lesions in keeping with the reported history of multiple sclerosis.  These appear stable in size, number and distribution from the prior exam.  No new discrete lesions.  No enhancing lesions.     No new parenchymal mass, hemorrhage or infarction.     The major arterial T2 skull base flow voids are preserved.  Dural venous  sinuses enhance appropriately.     Normal vascular flow voids are preserved.     Skull/Extracranial Contents (limited evaluation):     Bone marrow signal intensity is normal.     Impression:     Brain appears unchanged from prior exam, again demonstrating findings compatible with the reported history of multiple sclerosis.  No new or enhancing lesions to indicate ongoing or active demyelination.        Electronically signed by:Ac Barba MD  Date:                                            03/25/2025  Time:                                           09:08      Narrative & Impression  EXAMINATION:  MRI CERVICAL SPINE DEMYELINATING W W/O CONTRAST; MRI THORACIC SPINE DEMYELINATING W W/O CONTRAST     CLINICAL HISTORY:  Multiple sclerosis     TECHNIQUE:  Multiplanar, multisequence MR images of the cervical and thoracic spine were performed without the administration of contrast.     COMPARISON:  05/08/2023     FINDINGS:  The vertebral bodies demonstrate no evidence of fracture, osseous destructive process or aggressive bone marrow replacement process.     Normal sagittal alignment is preserved.  No significant spondylolisthesis     Intervertebral disk space heights are well-maintained.  Modest posterior disc osteophyte complex formation at few levels in the cervical spine.  No new large disc herniation, spinal canal stenosis, or neural foraminal narrowing is evident at any level.     Few scattered short-segment T2 hyperintense foci within the spinal cord, in keeping with areas of prior demyelination.  These appear unchanged from the prior examination with no new discrete lesions identified.  No enhancing lesions.     No intraspinal mass or fluid collection.     No acute abnormalities in the visualized paraspinal soft tissue structures.  Slight interval enlargement of a round T2 hyperintense focus in the upper left hepatic lobe (1.6 cm previously on the order of 1.0 cm).  Lesion not confidently delineated on the  postcontrast study due to motion.     Impression:     Few scattered demyelinating lesions within the spinal cord, similar to prior.  No new discrete lesion or enhancement to suggest ongoing or active demyelination.     Apparent interval enlargement of T2 hyperintense focus in the liver, only partially visualized.  This is thought to likely reflect a simple cyst but incompletely characterized on today's study.  Liver ultrasound could be performed for further characterization.        Electronically signed by:Ac Barba MD  Date:                                            03/25/2025  Time:                                           09:17          Images were reviewed with the patient.         Labs:     Lab Results   Component Value Date    MTPIPJLB51OG 95 04/09/2024    LBTHHPBI32CS 67 11/14/2022    BEHENIIV96ER 81 10/25/2021     Lab Results   Component Value Date    JCVINDEX 1.52 (A) 02/06/2020    JCVANTIBODY Positive (A) 02/06/2020     Lab Results   Component Value Date    WF0AARGA 73.8 02/06/2020    ABSOLUTECD3 1007.0 02/06/2020    LL4QSXAW 17.1 02/06/2020    ABSOLUTECD8 234.0 02/06/2020    NO4KXDKG 55.7 02/06/2020    ABSOLUTECD4 761.0 02/06/2020    LABCD48 3.25 02/06/2020     Lab Results   Component Value Date    WBC 5.47 02/06/2025    RBC 3.53 (L) 02/06/2025    HGB 11.8 (L) 02/06/2025    HCT 35.8 (L) 02/06/2025     (H) 02/06/2025    MCH 33.4 (H) 02/06/2025    MCHC 33.0 02/06/2025    RDW 11.7 02/06/2025    PLT 99 (L) 02/06/2025    MPV 11.0 02/06/2025    GRAN 3.1 02/06/2025    GRAN 57.4 02/06/2025    LYMPH 1.8 02/06/2025    LYMPH 32.0 02/06/2025    MONO 0.4 02/06/2025    MONO 7.7 02/06/2025    EOS 0.1 02/06/2025    BASO 0.03 02/06/2025    EOSINOPHIL 2.2 02/06/2025    BASOPHIL 0.5 02/06/2025     Sodium   Date Value Ref Range Status   02/06/2025 139 136 - 145 mmol/L Final     Potassium   Date Value Ref Range Status   02/06/2025 3.9 3.5 - 5.1 mmol/L Final     Chloride   Date Value Ref Range Status   02/06/2025  106 95 - 110 mmol/L Final     CO2   Date Value Ref Range Status   02/06/2025 23 23 - 29 mmol/L Final     Glucose   Date Value Ref Range Status   02/06/2025 77 70 - 110 mg/dL Final     BUN   Date Value Ref Range Status   02/06/2025 8 6 - 20 mg/dL Final     Creatinine   Date Value Ref Range Status   02/06/2025 0.6 0.5 - 1.4 mg/dL Final     Calcium   Date Value Ref Range Status   02/06/2025 9.0 8.7 - 10.5 mg/dL Final     Total Protein   Date Value Ref Range Status   02/06/2025 6.8 6.0 - 8.4 g/dL Final     Albumin   Date Value Ref Range Status   02/06/2025 4.0 3.5 - 5.2 g/dL Final     Total Bilirubin   Date Value Ref Range Status   02/06/2025 0.5 0.1 - 1.0 mg/dL Final     Comment:     For infants and newborns, interpretation of results should be based  on gestational age, weight and in agreement with clinical  observations.    Premature Infant recommended reference ranges:  Up to 24 hours.............<8.0 mg/dL  Up to 48 hours............<12.0 mg/dL  3-5 days..................<15.0 mg/dL  6-29 days.................<15.0 mg/dL       Alkaline Phosphatase   Date Value Ref Range Status   02/06/2025 41 40 - 150 U/L Final     AST   Date Value Ref Range Status   02/06/2025 17 10 - 40 U/L Final     ALT   Date Value Ref Range Status   02/06/2025 20 10 - 44 U/L Final     Anion Gap   Date Value Ref Range Status   02/06/2025 10 8 - 16 mmol/L Final     eGFR if    Date Value Ref Range Status   06/23/2022 >60.0 >60 mL/min/1.73 m^2 Final     eGFR if non    Date Value Ref Range Status   06/23/2022 >60.0 >60 mL/min/1.73 m^2 Final     Comment:     Calculation used to obtain the estimated glomerular filtration  rate (eGFR) is the CKD-EPI equation.        Lab Results   Component Value Date    HEPBSAG Negative 04/18/2022    HEPBSAB Positive 04/18/2022    HEPBCAB Negative 04/18/2022           MS Impression and Plan:     NEURO MULTIPLE SCLEROSIS IMPRESSION:   Number of relapses in the past year?:  1  Number of  relapses in the past year? comment:  Clinical relapse in February was treated with steroids; symptoms have resolved.   Clinical Progression:  Clinically Stable  MRI Progression:  Stable  MS Classification:  Relapsing-Remitting MS  Current DMT: interferon beta-1a SQ  DMT comment:  In light of clinical relapse, we discussed potential switch to Kesimpta as DMT. She would like to do her own research on the medication, but she is interested. We discussed that it does suppress the immune system. We discussed risk of flu-like side effects, though minimal in my experience. She is concerned about mood changes, as her mood was affected when she was on Ocrevus with similar MOA to Kesimpta. She also got sick more often while on Ocrevus. However, this was at the peak of the COVID19 pandemic.     We can do a virtual visit in a few weeks if she wants to discuss more about Kesimpta. In the meantime, she will continue Rebif.   Symptom Management:  Implement change in symptom management  Implement Change in Symptom Management:  Sleep  Implement Change in Symptom Management comment: Trazodone refill sent to pharmacy.   Additional Impressions: Liver ultrasound has been ordered to evaluate T2 hyperintense focus seen on T-spine MRI.   She will follow up with Dr. Hernandez in 4 months.   The visit today is associated with current or anticipated ongoing medical care related to this patient's single serious condition/complex condition of multiple sclerosis.        Total time spent with patient: 32 minutes   Total time spent on encounter: 50 minutes         RADHA Monzon, CNS    Problem List Items Addressed This Visit          Neurologic Problems    Multiple sclerosis - Primary     Other Visit Diagnoses         Abnormal liver diagnostic imaging        Relevant Orders    US Abdomen Limited      Insomnia, unspecified type        Relevant Medications    traZODone (DESYREL) 50 MG tablet      Counseling regarding goals of care           Prophylactic immunotherapy                       [1]   Social History  Tobacco Use    Smoking status: Never     Passive exposure: Never    Smokeless tobacco: Never   Substance Use Topics    Alcohol use: Yes     Alcohol/week: 4.0 standard drinks of alcohol     Types: 4 Glasses of wine per week     Comment: socially    Drug use: Yes     Types: Marijuana

## 2025-03-26 ENCOUNTER — PATIENT MESSAGE (OUTPATIENT)
Dept: NEUROLOGY | Facility: CLINIC | Age: 40
End: 2025-03-26
Payer: COMMERCIAL

## 2025-03-31 ENCOUNTER — RESULTS FOLLOW-UP (OUTPATIENT)
Dept: NEUROLOGY | Facility: CLINIC | Age: 40
End: 2025-03-31
Payer: COMMERCIAL

## 2025-04-02 ENCOUNTER — PATIENT MESSAGE (OUTPATIENT)
Dept: PSYCHIATRY | Facility: CLINIC | Age: 40
End: 2025-04-02
Payer: COMMERCIAL

## 2025-04-04 ENCOUNTER — PATIENT MESSAGE (OUTPATIENT)
Dept: PSYCHIATRY | Facility: CLINIC | Age: 40
End: 2025-04-04
Payer: COMMERCIAL

## 2025-04-04 ENCOUNTER — OFFICE VISIT (OUTPATIENT)
Dept: PSYCHIATRY | Facility: CLINIC | Age: 40
End: 2025-04-04
Payer: COMMERCIAL

## 2025-04-04 DIAGNOSIS — F41.1 GENERALIZED ANXIETY DISORDER WITH PANIC ATTACKS: Primary | ICD-10-CM

## 2025-04-04 DIAGNOSIS — F41.0 GENERALIZED ANXIETY DISORDER WITH PANIC ATTACKS: Primary | ICD-10-CM

## 2025-04-04 PROCEDURE — 90837 PSYTX W PT 60 MINUTES: CPT | Mod: 95,,, | Performed by: SOCIAL WORKER

## 2025-04-04 NOTE — PROGRESS NOTES
"Individual Psychotherapy (PhD/LCSW)    4/4/2025    Site:  Telemed       The patient location is: home  The chief complaint leading to consultation is: anxiety, panic attacks    Visit type: audiovisual    Face to Face time with patient: 53 mins, 10:01 - 10:54am  58 minutes of total time spent on the encounter, which includes face to face time and non-face to face time preparing to see the patient (eg, review of tests), Obtaining and/or reviewing separately obtained history, Documenting clinical information in the electronic or other health record, Independently interpreting results (not separately reported) and communicating results to the patient/family/caregiver, or Care coordination (not separately reported).       Each patient to whom he or she provides medical services by telemedicine is:  (1) informed of the relationship between the physician and patient and the respective role of any other health care provider with respect to management of the patient; and (2) notified that he or she may decline to receive medical services by telemedicine and may withdraw from such care at any time.    Therapeutic Intervention: Met with patient.  Outpatient - Insight oriented psychotherapy 60 min - CPT code 42204     Chief complaint/reason for encounter: anxiety, panic attacks     Interval history and content of current session:   King presented to session casually dressed and well-groomed.  She shared that last week was "so crazy" and that she needed to take some time off today.  She plans to run some errands and take herself to lunch.  She states despite the work stress things have been "pretty good".  She described a challenge recently of learning that a "mass" was spotted on her liver during her MRI.  She "spiraled" and states she both accepted her anxiety but also tried to avoid it by searching the Internet for answers.  However, she states she was able to move on fairly quickly from her worries.  Interestingly, while " "King states that things have been pretty good since we last met, she also reports not sleeping well and waking up early feeling anxious.  She worries about her day and planning everything "just so".  We discussed this tendency for perfectionism and also harsh self-criticism - today blaming herself for "letting myself get so stressed that I suffered physically."  Discussed [place] [employer] meditations and usefulness for building self-compassion.     Treatment plan:  Target symptoms: anxiety , panic attacks  Why chosen therapy is appropriate versus another modality: relevant to diagnosis, patient responds to this modality  Outcome monitoring methods: self-report  Therapeutic intervention type: insight oriented psychotherapy    Risk parameters:  Patient reports no suicidal ideation  Patient reports no homicidal ideation  Patient reports no self-injurious behavior  Patient reports no violent behavior    Verbal deficits: None    Patient's response to intervention:  The patient's response to intervention is accepting.    Progress toward goals and other mental status changes:  The patient's progress toward goals is fair .    Diagnosis:     ICD-10-CM ICD-9-CM   1. Generalized anxiety disorder with panic attacks  F41.1 300.02    F41.0 300.01       Plan:  individual psychotherapy    Return to clinic: 1 month    Length of Service (minutes):  53                    "

## 2025-04-14 ENCOUNTER — OFFICE VISIT (OUTPATIENT)
Dept: NEUROLOGY | Facility: CLINIC | Age: 40
End: 2025-04-14
Payer: COMMERCIAL

## 2025-04-14 DIAGNOSIS — Z79.899 HIGH RISK MEDICATION USE: ICD-10-CM

## 2025-04-14 DIAGNOSIS — G35 MULTIPLE SCLEROSIS: Primary | ICD-10-CM

## 2025-04-14 DIAGNOSIS — Z71.89 COUNSELING REGARDING GOALS OF CARE: ICD-10-CM

## 2025-04-14 DIAGNOSIS — Z29.89 PROPHYLACTIC IMMUNOTHERAPY: ICD-10-CM

## 2025-04-14 PROCEDURE — 1159F MED LIST DOCD IN RCRD: CPT | Mod: CPTII,95,, | Performed by: CLINICAL NURSE SPECIALIST

## 2025-04-14 PROCEDURE — 98005 SYNCH AUDIO-VIDEO EST LOW 20: CPT | Mod: 95,,, | Performed by: CLINICAL NURSE SPECIALIST

## 2025-04-14 PROCEDURE — G2211 COMPLEX E/M VISIT ADD ON: HCPCS | Mod: 95,,, | Performed by: CLINICAL NURSE SPECIALIST

## 2025-04-14 NOTE — PROGRESS NOTES
Subjective:          Patient ID: King Perez is a 40 y.o. female who presents today for a fit-in virtual visit for MS.  She was last seen on 3/25/25. The history has been provided by the patient.     The patient location is: her home   The chief complaint leading to consultation is: MS     Visit type: audiovisual    Face to Face time with patient: 12 minutes   20 minutes of total time spent on the encounter, which includes face to face time and non-face to face time preparing to see the patient (eg, review of tests), Obtaining and/or reviewing separately obtained history, Documenting clinical information in the electronic or other health record, Independently interpreting results (not separately reported) and communicating results to the patient/family/caregiver, or Care coordination (not separately reported).         Each patient to whom he or she provides medical services by telemedicine is:  (1) informed of the relationship between the physician and patient and the respective role of any other health care provider with respect to management of the patient; and (2) notified that he or she may decline to receive medical services by telemedicine and may withdraw from such care at any time.      MS HPI:  DMT: Rebif   Side effects from DMT? No  Taking vitamin D3 as recommended? Yes   Patient expressed interest in Kesimpta at last visit after experiencing likely relapse in February. She has done her own research and would like to discuss Kesimpta in more detail today.     Medications:  Current Outpatient Medications   Medication Sig    B complex-C-E-Zn Tab Take 1 tablet by mouth once daily.    fexofenadine (ALLEGRA) 180 MG tablet Take 1 tablet (180 mg total) by mouth once daily.    fluticasone propionate (FLONASE) 50 mcg/actuation nasal spray 2 sprays (100 mcg total) by Each Nostril route once daily.    interferon beta-1a, albumin, (REBIF REBIDOSE) 44 mcg/0.5 mL PnIj Inject 0.5 mLs (44 mcg total) into the  skin 3 (three) times a week.    norethindrone (MICRONOR) 0.35 mg tablet Take 1 tablet (0.35 mg total) by mouth once daily.    rizatriptan (MAXALT-MLT) 5 MG disintegrating tablet Take at onset of migraine, can repeat in 2 hrs if needed.  No more than 2 tabs per day or 3 days/wk.    semaglutide, weight loss, 0.25 mg/0.5 mL PnIj Inject 0.25 mg into the skin every 7 days.    traZODone (DESYREL) 50 MG tablet Take 1 to 1/2 tab po hs    valACYclovir (VALTREX) 1000 MG tablet TAKE 1 TABLET(1000 MG) BY MOUTH TWICE DAILY AS NEEDED    vitamin D 1000 units Tab Take 5,000 Units by mouth once daily.      No current facility-administered medications for this visit.       SOCIAL HISTORY  Social History[1]    Living arrangements - the patient lives with her      ROS:        3/18/2025    10:16 AM   REVIEW OF SYMPTOMS   Do you feel abnormally tired on most days? No   Do you feel you generally sleep well? Yes   Do you have difficulty controlling your bladder?  No   Do you have difficulty controlling your bowels?  No   Do you have frequent muscle cramps, tightness or spasms in your limbs?  No   Do you have new visual symptoms?  Yes   Do you have worsening difficulty with your memory or thinking? No   Do you have worsening symptoms of anxiety or depression?  No   For patients who walk, Do you have more difficulty walking?  Not Applicable   Have you fallen since your last visit?  No   For patients who use wheelchairs: Do you have any skin wounds or breakdown? Not Applicable   Do you have difficulty using your hands?  No   Do you have shooting or burning pain? No   Do you have difficulty with sexual function?  Yes   If you are sexually active, are you using birth control? Y/N  N/A Yes   Do you often choke when swallowing liquids or solid food?  No   Do you experience worsening symptoms when overheated? No   Do you need any new equipment such as a wheelchair, walker or shower chair? No   Do you receive co-pay financial assistance for  your principal MS medicine? Yes   Would you be interested in participating in an MS research trial in the future? No   For patients on Gilenya, Tecfidera, Aubagio, Rituxan, Ocrevus, Tysabri, Lemtrada or Methotrexate, are you aware that you should NOT receive live virus vaccines?  Not Applicable   Do you feel you have adequate family/friend support?  Yes   Do you have health insurance?   Yes   Are you currently employed? Yes   Do you receive SSDI/SSI?  Not Applicable   Do you use marijuana or cannabis products? Yes   How often? Weekly   Have you been diagnosed with a urinary tract infection since your last visit here? No   Have you been diagnosed with a respiratory tract infection since your last visit here? No   Have you been to the emergency room since your last visit here? No   Have you been hospitalized since your last visit here?  No                Objective:        1. 25 foot timed walk:      5/8/2024    11:30 AM 3/25/2025     8:30 AM   Timed 25 Foot Walk:   Did patient wear an AFO? No No   Was assistive device used? No No   Time for 25 Foot Walk (seconds) 3.6 3.6   Time for 25 Foot Walk (seconds) 3.8 4     Neurological Exam    Deferred   Imaging:         Results for orders placed during the hospital encounter of 03/24/25    MRI Brain Demyelinating W W/O Contrast    Impression  Brain appears unchanged from prior exam, again demonstrating findings compatible with the reported history of multiple sclerosis.  No new or enhancing lesions to indicate ongoing or active demyelination.      Electronically signed by: Ac Barba MD  Date:    03/25/2025  Time:    09:08    Results for orders placed during the hospital encounter of 03/24/25    MRI Cervical Spine Demyelinating W W/O Contrast    Impression  Few scattered demyelinating lesions within the spinal cord, similar to prior.  No new discrete lesion or enhancement to suggest ongoing or active demyelination.    Apparent interval enlargement of T2 hyperintense focus  in the liver, only partially visualized.  This is thought to likely reflect a simple cyst but incompletely characterized on today's study.  Liver ultrasound could be performed for further characterization.      Electronically signed by: Ac Barba MD  Date:    03/25/2025  Time:    09:17    Results for orders placed during the hospital encounter of 03/24/25    MRI Thoracic Spine Demyelinating W W/O Contrast    Impression  Few scattered demyelinating lesions within the spinal cord, similar to prior.  No new discrete lesion or enhancement to suggest ongoing or active demyelination.    Apparent interval enlargement of T2 hyperintense focus in the liver, only partially visualized.  This is thought to likely reflect a simple cyst but incompletely characterized on today's study.  Liver ultrasound could be performed for further characterization.      Electronically signed by: Ac Barba MD  Date:    03/25/2025  Time:    09:17        Labs:     Lab Results   Component Value Date    OPPAIGGE38RE 95 04/09/2024    SXWCCTVK91GJ 67 11/14/2022    LLHXVJKW31RO 81 10/25/2021     Lab Results   Component Value Date    JCVINDEX 1.52 (A) 02/06/2020    JCVANTIBODY Positive (A) 02/06/2020     Lab Results   Component Value Date    WJ8DWVTP 73.8 02/06/2020    ABSOLUTECD3 1007.0 02/06/2020    CN2QWHXK 17.1 02/06/2020    ABSOLUTECD8 234.0 02/06/2020    GC9LRJCV 55.7 02/06/2020    ABSOLUTECD4 761.0 02/06/2020    LABCD48 3.25 02/06/2020     Lab Results   Component Value Date    WBC 5.47 02/06/2025    RBC 3.53 (L) 02/06/2025    HGB 11.8 (L) 02/06/2025    HCT 35.8 (L) 02/06/2025     (H) 02/06/2025    MCH 33.4 (H) 02/06/2025    MCHC 33.0 02/06/2025    RDW 11.7 02/06/2025    PLT 99 (L) 02/06/2025    MPV 11.0 02/06/2025    GRAN 3.1 02/06/2025    GRAN 57.4 02/06/2025    LYMPH 1.8 02/06/2025    LYMPH 32.0 02/06/2025    MONO 0.4 02/06/2025    MONO 7.7 02/06/2025    EOS 0.1 02/06/2025    BASO 0.03 02/06/2025    EOSINOPHIL 2.2 02/06/2025     BASOPHIL 0.5 02/06/2025     Sodium   Date Value Ref Range Status   02/06/2025 139 136 - 145 mmol/L Final     Potassium   Date Value Ref Range Status   02/06/2025 3.9 3.5 - 5.1 mmol/L Final     Chloride   Date Value Ref Range Status   02/06/2025 106 95 - 110 mmol/L Final     CO2   Date Value Ref Range Status   02/06/2025 23 23 - 29 mmol/L Final     Glucose   Date Value Ref Range Status   02/06/2025 77 70 - 110 mg/dL Final     BUN   Date Value Ref Range Status   02/06/2025 8 6 - 20 mg/dL Final     Creatinine   Date Value Ref Range Status   02/06/2025 0.6 0.5 - 1.4 mg/dL Final     Calcium   Date Value Ref Range Status   02/06/2025 9.0 8.7 - 10.5 mg/dL Final     Total Protein   Date Value Ref Range Status   02/06/2025 6.8 6.0 - 8.4 g/dL Final     Albumin   Date Value Ref Range Status   02/06/2025 4.0 3.5 - 5.2 g/dL Final     Total Bilirubin   Date Value Ref Range Status   02/06/2025 0.5 0.1 - 1.0 mg/dL Final     Comment:     For infants and newborns, interpretation of results should be based  on gestational age, weight and in agreement with clinical  observations.    Premature Infant recommended reference ranges:  Up to 24 hours.............<8.0 mg/dL  Up to 48 hours............<12.0 mg/dL  3-5 days..................<15.0 mg/dL  6-29 days.................<15.0 mg/dL       Alkaline Phosphatase   Date Value Ref Range Status   02/06/2025 41 40 - 150 U/L Final     AST   Date Value Ref Range Status   02/06/2025 17 10 - 40 U/L Final     ALT   Date Value Ref Range Status   02/06/2025 20 10 - 44 U/L Final     Anion Gap   Date Value Ref Range Status   02/06/2025 10 8 - 16 mmol/L Final     eGFR if    Date Value Ref Range Status   06/23/2022 >60.0 >60 mL/min/1.73 m^2 Final     eGFR if non    Date Value Ref Range Status   06/23/2022 >60.0 >60 mL/min/1.73 m^2 Final     Comment:     Calculation used to obtain the estimated glomerular filtration  rate (eGFR) is the CKD-EPI equation.        Lab Results    Component Value Date    HEPBSAG Negative 04/18/2022    HEPBSAB Positive 04/18/2022    HEPBCAB Negative 04/18/2022           MS Impression and Plan:     NEURO MULTIPLE SCLEROSIS IMPRESSION:   MS Classification:  Relapsing-Remitting MS  Current DMT: interferon beta-1a SQ  DMT:  Switch Disease Modifying therapy  DMT comment:  We will switch disease modifying therapy from Rebif to Kesimpta at this time. We discussed method of administration with Kesimpta, discussing injection sites and possible risk of injection site reaction. We discussed loading dose during the first 3 weeks of therapy. We discussed rare, but possible side effects of flu-like reactions (low grade fever, body aches, fatigue, headache, diarrhea). Pre-medication is not usually needed, but she can take Tylenol or Advil prior to injection if ultimately needed. We discussed risk of immunosuppression and lab monitoring prior to starting and every 6 months while on Kesimpta. We will get pre-immunosuppression labs this week. Our pharmacist, Braden Andres, will send in the start form and Rx.     She will follow up with Dr. Hernandez in July as currently scheduled.     The visit today is associated with current or anticipated ongoing medical care related to this patient's single serious condition/complex condition of multiple sclerosis.        RADHA Monzon, CNS      Problem List Items Addressed This Visit          Neurologic Problems    Multiple sclerosis - Primary    Relevant Orders    CBC Auto Differential    Comprehensive Metabolic Panel    Bedford-Suppressor Ratio    Hepatitis A antibody, IgG    Hepatitis B Core Antibody, Total    Hepatitis B Surface Ab, Qualitative    Hepatitis B Surface Antigen    Hepatitis C Antibody    HIV 1/2 Ag/Ab (4th Gen)    Immunoglobulins (IgG, IgA, IgM) Quantitative    Quantiferon Gold TB    Strongyloides IgG Antibodies    Varicella Zoster Antibody, IgG    Treponema Pallidium Antibodies IgG, IgM     Other Visit Diagnoses          Counseling regarding goals of care          Prophylactic immunotherapy          High risk medication use                     [1]   Social History  Tobacco Use    Smoking status: Never     Passive exposure: Never    Smokeless tobacco: Never   Substance Use Topics    Alcohol use: Yes     Alcohol/week: 4.0 standard drinks of alcohol     Types: 4 Glasses of wine per week     Comment: socially    Drug use: Yes     Types: Marijuana

## 2025-04-16 ENCOUNTER — TELEPHONE (OUTPATIENT)
Dept: NEUROLOGY | Facility: CLINIC | Age: 40
End: 2025-04-16
Payer: COMMERCIAL

## 2025-04-21 ENCOUNTER — LAB VISIT (OUTPATIENT)
Dept: LAB | Facility: HOSPITAL | Age: 40
End: 2025-04-21
Payer: COMMERCIAL

## 2025-04-21 DIAGNOSIS — G35 MULTIPLE SCLEROSIS: ICD-10-CM

## 2025-04-21 LAB
ABSOLUTE EOSINOPHIL (OHS): 0.06 K/UL
ABSOLUTE MONOCYTE (OHS): 0.2 K/UL (ref 0.3–1)
ABSOLUTE NEUTROPHIL COUNT (OHS): 2.24 K/UL (ref 1.8–7.7)
ALBUMIN SERPL BCP-MCNC: 4.1 G/DL (ref 3.5–5.2)
ALP SERPL-CCNC: 50 UNIT/L (ref 40–150)
ALT SERPL W/O P-5'-P-CCNC: 17 UNIT/L (ref 10–44)
ANION GAP (OHS): 7 MMOL/L (ref 8–16)
AST SERPL-CCNC: 21 UNIT/L (ref 11–45)
BASOPHILS # BLD AUTO: 0.03 K/UL
BASOPHILS NFR BLD AUTO: 0.6 %
BILIRUB SERPL-MCNC: 0.5 MG/DL (ref 0.1–1)
BUN SERPL-MCNC: 4 MG/DL (ref 6–20)
CALCIUM SERPL-MCNC: 9.3 MG/DL (ref 8.7–10.5)
CHLORIDE SERPL-SCNC: 106 MMOL/L (ref 95–110)
CO2 SERPL-SCNC: 26 MMOL/L (ref 23–29)
CREAT SERPL-MCNC: 0.6 MG/DL (ref 0.5–1.4)
ERYTHROCYTE [DISTWIDTH] IN BLOOD BY AUTOMATED COUNT: 11.9 % (ref 11.5–14.5)
GFR SERPLBLD CREATININE-BSD FMLA CKD-EPI: >60 ML/MIN/1.73/M2
GLUCOSE SERPL-MCNC: 85 MG/DL (ref 70–110)
HAV AB SER QL IA: REACTIVE
HBV CORE AB SERPL QL IA: NORMAL
HBV SURFACE AB SER-ACNC: 13.84 MIU/ML
HBV SURFACE AB SERPL IA-ACNC: REACTIVE M[IU]/ML
HBV SURFACE AG SERPL QL IA: NORMAL
HCT VFR BLD AUTO: 36.5 % (ref 37–48.5)
HCV AB SERPL QL IA: NORMAL
HGB BLD-MCNC: 12.2 GM/DL (ref 12–16)
HIV 1+2 AB+HIV1 P24 AG SERPL QL IA: NORMAL
IGA SERPL-MCNC: 153 MG/DL (ref 40–350)
IGG SERPL-MCNC: 1213 MG/DL (ref 650–1600)
IGM SERPL-MCNC: 54 MG/DL (ref 50–300)
IMM GRANULOCYTES # BLD AUTO: 0.01 K/UL (ref 0–0.04)
IMM GRANULOCYTES NFR BLD AUTO: 0.2 % (ref 0–0.5)
LYMPHOCYTES # BLD AUTO: 2.43 K/UL (ref 1–4.8)
MCH RBC QN AUTO: 33.6 PG (ref 27–31)
MCHC RBC AUTO-ENTMCNC: 33.4 G/DL (ref 32–36)
MCV RBC AUTO: 101 FL (ref 82–98)
NUCLEATED RBC (/100WBC) (OHS): 0 /100 WBC
PLATELET # BLD AUTO: 71 K/UL (ref 150–450)
PMV BLD AUTO: 12.1 FL (ref 9.2–12.9)
POTASSIUM SERPL-SCNC: 3.8 MMOL/L (ref 3.5–5.1)
PROT SERPL-MCNC: 7.3 GM/DL (ref 6–8.4)
RBC # BLD AUTO: 3.63 M/UL (ref 4–5.4)
RELATIVE EOSINOPHIL (OHS): 1.2 %
RELATIVE LYMPHOCYTE (OHS): 48.9 % (ref 18–48)
RELATIVE MONOCYTE (OHS): 4 % (ref 4–15)
RELATIVE NEUTROPHIL (OHS): 45.1 % (ref 38–73)
SODIUM SERPL-SCNC: 139 MMOL/L (ref 136–145)
T PALLIDUM IGG+IGM SER QL: NORMAL
WBC # BLD AUTO: 4.97 K/UL (ref 3.9–12.7)

## 2025-04-21 PROCEDURE — 86803 HEPATITIS C AB TEST: CPT

## 2025-04-21 PROCEDURE — 85025 COMPLETE CBC W/AUTO DIFF WBC: CPT

## 2025-04-21 PROCEDURE — 86787 VARICELLA-ZOSTER ANTIBODY: CPT

## 2025-04-21 PROCEDURE — 86704 HEP B CORE ANTIBODY TOTAL: CPT

## 2025-04-21 PROCEDURE — 86682 HELMINTH ANTIBODY: CPT

## 2025-04-21 PROCEDURE — 86480 TB TEST CELL IMMUN MEASURE: CPT

## 2025-04-21 PROCEDURE — 36415 COLL VENOUS BLD VENIPUNCTURE: CPT | Mod: PO

## 2025-04-21 PROCEDURE — 86706 HEP B SURFACE ANTIBODY: CPT | Mod: 59

## 2025-04-21 PROCEDURE — 82947 ASSAY GLUCOSE BLOOD QUANT: CPT

## 2025-04-21 PROCEDURE — 87340 HEPATITIS B SURFACE AG IA: CPT

## 2025-04-21 PROCEDURE — 82784 ASSAY IGA/IGD/IGG/IGM EACH: CPT | Mod: 59

## 2025-04-21 PROCEDURE — 86790 VIRUS ANTIBODY NOS: CPT

## 2025-04-21 PROCEDURE — 86360 T CELL ABSOLUTE COUNT/RATIO: CPT

## 2025-04-21 PROCEDURE — 86593 SYPHILIS TEST NON-TREP QUANT: CPT

## 2025-04-21 PROCEDURE — 87389 HIV-1 AG W/HIV-1&-2 AB AG IA: CPT

## 2025-04-22 LAB
CD3 ABSOLUTE FLOW (OHS): 1833 CELLS/UL (ref 700–2100)
CD3 PERCENT (OHS): 68.71 % (ref 55–83)
CD3+CD4+ CELLS # SPEC: 1361 CELLS/UL (ref 300–1400)
CD3+CD4+ CELLS NFR BLD: 51.01 % (ref 28–57)
CD3+CD8+ CELLS NFR SPEC: 17.16 % (ref 10–39)
CD4/CD8 RATIO FLOW (OHS): 2.97 (ref 0.9–3.6)
CD8 ABSOLUTE FLOW (OHS): 458 CELLS/UL (ref 200–900)
LABORATORY COMMENT REPORT: NORMAL
V.ZOSTER IGG INTERP (OHS): POSITIVE
VARICELLA ZOSTER IGG (OHS): 12.8 S/CO

## 2025-04-23 LAB
MITOGEN MINUS NIL (OHS): 9.98
NIL TB SYNCED (OHS): 0.02
QUANTIFERON GOLD INTERP (OHS): NEGATIVE
TB1 AG MINUS NIL (OHS): 0.03
TB2 AG MINUS NIL (OHS): 0.03

## 2025-04-24 LAB — STRONGYLOIDES IGG SER QL IA: NEGATIVE

## 2025-04-25 ENCOUNTER — RESULTS FOLLOW-UP (OUTPATIENT)
Dept: NEUROLOGY | Facility: CLINIC | Age: 40
End: 2025-04-25

## 2025-04-25 DIAGNOSIS — R79.89 ABNORMAL CBC: Primary | ICD-10-CM

## 2025-04-28 ENCOUNTER — OFFICE VISIT (OUTPATIENT)
Dept: PSYCHIATRY | Facility: CLINIC | Age: 40
End: 2025-04-28
Payer: COMMERCIAL

## 2025-04-28 DIAGNOSIS — F41.1 GENERALIZED ANXIETY DISORDER WITH PANIC ATTACKS: Primary | ICD-10-CM

## 2025-04-28 DIAGNOSIS — F41.0 GENERALIZED ANXIETY DISORDER WITH PANIC ATTACKS: Primary | ICD-10-CM

## 2025-04-28 PROCEDURE — 90837 PSYTX W PT 60 MINUTES: CPT | Mod: 95,,, | Performed by: SOCIAL WORKER

## 2025-04-28 NOTE — PROGRESS NOTES
"Individual Psychotherapy (PhD/LCSW)    4/28/2025    Site:  Telemed       The patient location is: home  The chief complaint leading to consultation is: anxiety, panic attacks    Visit type: audiovisual    Face to Face time with patient: 58 mins, 3-3:58pm  63 minutes of total time spent on the encounter, which includes face to face time and non-face to face time preparing to see the patient (eg, review of tests), Obtaining and/or reviewing separately obtained history, Documenting clinical information in the electronic or other health record, Independently interpreting results (not separately reported) and communicating results to the patient/family/caregiver, or Care coordination (not separately reported).       Each patient to whom he or she provides medical services by telemedicine is:  (1) informed of the relationship between the physician and patient and the respective role of any other health care provider with respect to management of the patient; and (2) notified that he or she may decline to receive medical services by telemedicine and may withdraw from such care at any time.    Therapeutic Intervention: Met with patient.  Outpatient - Insight oriented psychotherapy 60 min - CPT code 18632     Chief complaint/reason for encounter: anxiety, panic attacks     Interval history and content of current session:   King presented to session casually dressed and well-groomed, stating she's been "okay".  She took a week off of work and traveled with her , but she is ready to return.  Today, she discussed her anticipation and apprehension around starting a new MS DMT. She has decided to try it for 6 months, then reassess.  May will be busy for her, then she will reduce her hours in June.  She shared that she's worked on self-judgment and being too critical of herself, since our last session.  She is doing more meditation at night and decided to share some of her challenges with a friend despite some " "apprehension about doing this.  She felt very supported and was glad she decided to share.  Today, she expresses a desire to work on her anxiety such that it's no longer a "road block" that prevents her from traveling and committing to things. We will meet again in the beginning of June and continue to explore this topic.     Treatment plan:  Target symptoms: anxiety , panic attacks  Why chosen therapy is appropriate versus another modality: relevant to diagnosis, patient responds to this modality  Outcome monitoring methods: self-report  Therapeutic intervention type: insight oriented psychotherapy    Risk parameters:  Patient reports no suicidal ideation  Patient reports no homicidal ideation  Patient reports no self-injurious behavior  Patient reports no violent behavior    Verbal deficits: None    Patient's response to intervention:  The patient's response to intervention is accepting.    Progress toward goals and other mental status changes:  The patient's progress toward goals is fair .    Diagnosis:     ICD-10-CM ICD-9-CM   1. Generalized anxiety disorder with panic attacks  F41.1 300.02    F41.0 300.01     Plan:  individual psychotherapy    Return to clinic: 6 weeks    Length of Service (minutes): 58                      "

## 2025-04-29 ENCOUNTER — TELEPHONE (OUTPATIENT)
Dept: NEUROLOGY | Facility: CLINIC | Age: 40
End: 2025-04-29
Payer: COMMERCIAL

## 2025-04-29 DIAGNOSIS — G35 MULTIPLE SCLEROSIS: Primary | ICD-10-CM

## 2025-04-29 NOTE — TELEPHONE ENCOUNTER
Kesimpta new start    Labs:  4/21/25  WBC 4.97  ALC 2430  AST 21, ALT 17  IgG  1213  IgM  54  IgA 153  HBsAg - anti-Hbc - anti-Hbs +, immune, no current or past infection  Hep C Ab -  Hep A IgG +, immune  HIV -  Varicella IgG +, immune  Strongyloides -   TB gold -    Vaccines:  Influenza: Due 2024 - 2025 Flu season  Pneumonia (Prevnar 20): One dose due now  PCV 13: 2/13/2020  PPSV 23: 5/1/2020  Tetanus (TDAP): 2/13/2020, due 2/13/2030  Hepatitis B (Heplisav-B):  Immune per labs on 4/29/25  Hepatitis A (Havrix):  Immune per labs on 4/29/25  Shingles (Shingrix): One dose due now, then another dose due in 2 months  Covid:  One dose due now  Respiratory Syncytial Virus (Arexvy): Not a candidate at this time    Start Form:   Submitted via CMM on 4/29    [place]:    48 hours between Rebif and starting Kesimpta    Contraception:  Norethindrone

## 2025-04-29 NOTE — TELEPHONE ENCOUNTER
----- Message from Margie Fierro sent at 4/14/2025  5:51 PM CDT -----  Braden, she's going to switch to Kesimpta. She will have labs this week.

## 2025-04-30 RX ORDER — OFATUMUMAB 20 MG/.4ML
INJECTION, SOLUTION SUBCUTANEOUS
Qty: 0.4 ML | Refills: 4 | Status: ACTIVE | OUTPATIENT
Start: 2025-04-30

## 2025-04-30 RX ORDER — INTERFERON BETA-1A 44 UG/.5ML
44 INJECTION, SOLUTION SUBCUTANEOUS
Qty: 12 PEN | Refills: 0 | Status: SHIPPED | OUTPATIENT
Start: 2025-04-30 | End: 2025-04-30

## 2025-04-30 RX ORDER — INTERFERON BETA-1A 44 UG/.5ML
44 INJECTION, SOLUTION SUBCUTANEOUS
Qty: 12 PEN | Refills: 0 | Status: SHIPPED | OUTPATIENT
Start: 2025-04-30

## 2025-04-30 RX ORDER — OFATUMUMAB 20 MG/.4ML
INJECTION, SOLUTION SUBCUTANEOUS
Qty: 1.2 ML | Refills: 0 | Status: ACTIVE | OUTPATIENT
Start: 2025-04-30

## 2025-05-01 ENCOUNTER — TELEPHONE (OUTPATIENT)
Dept: NEUROLOGY | Facility: CLINIC | Age: 40
End: 2025-05-01
Payer: COMMERCIAL

## 2025-05-02 ENCOUNTER — TELEPHONE (OUTPATIENT)
Dept: NEUROLOGY | Facility: CLINIC | Age: 40
End: 2025-05-02
Payer: COMMERCIAL

## 2025-05-12 ENCOUNTER — LAB VISIT (OUTPATIENT)
Dept: LAB | Facility: HOSPITAL | Age: 40
End: 2025-05-12
Payer: COMMERCIAL

## 2025-05-12 DIAGNOSIS — R79.89 ABNORMAL CBC: ICD-10-CM

## 2025-05-12 LAB
ABSOLUTE EOSINOPHIL (OHS): 0.05 K/UL
ABSOLUTE MONOCYTE (OHS): 0.23 K/UL (ref 0.3–1)
ABSOLUTE NEUTROPHIL COUNT (OHS): 2.24 K/UL (ref 1.8–7.7)
BASOPHILS # BLD AUTO: 0.05 K/UL
BASOPHILS NFR BLD AUTO: 1.1 %
ERYTHROCYTE [DISTWIDTH] IN BLOOD BY AUTOMATED COUNT: 11.7 % (ref 11.5–14.5)
HCT VFR BLD AUTO: 35.1 % (ref 37–48.5)
HGB BLD-MCNC: 11.8 GM/DL (ref 12–16)
IMM GRANULOCYTES # BLD AUTO: 0.02 K/UL (ref 0–0.04)
IMM GRANULOCYTES NFR BLD AUTO: 0.4 % (ref 0–0.5)
LYMPHOCYTES # BLD AUTO: 2.13 K/UL (ref 1–4.8)
MCH RBC QN AUTO: 33.2 PG (ref 27–31)
MCHC RBC AUTO-ENTMCNC: 33.6 G/DL (ref 32–36)
MCV RBC AUTO: 99 FL (ref 82–98)
NUCLEATED RBC (/100WBC) (OHS): 0 /100 WBC
PLATELET # BLD AUTO: 85 K/UL (ref 150–450)
PMV BLD AUTO: 11.9 FL (ref 9.2–12.9)
RBC # BLD AUTO: 3.55 M/UL (ref 4–5.4)
RELATIVE EOSINOPHIL (OHS): 1.1 %
RELATIVE LYMPHOCYTE (OHS): 45.1 % (ref 18–48)
RELATIVE MONOCYTE (OHS): 4.9 % (ref 4–15)
RELATIVE NEUTROPHIL (OHS): 47.4 % (ref 38–73)
WBC # BLD AUTO: 4.72 K/UL (ref 3.9–12.7)

## 2025-05-12 PROCEDURE — 36415 COLL VENOUS BLD VENIPUNCTURE: CPT | Mod: PO

## 2025-05-12 PROCEDURE — 85025 COMPLETE CBC W/AUTO DIFF WBC: CPT

## 2025-05-13 ENCOUNTER — PATIENT MESSAGE (OUTPATIENT)
Dept: PSYCHIATRY | Facility: CLINIC | Age: 40
End: 2025-05-13
Payer: COMMERCIAL

## 2025-05-15 ENCOUNTER — RESULTS FOLLOW-UP (OUTPATIENT)
Dept: NEUROLOGY | Facility: CLINIC | Age: 40
End: 2025-05-15

## 2025-05-19 ENCOUNTER — PATIENT MESSAGE (OUTPATIENT)
Dept: NEUROLOGY | Facility: CLINIC | Age: 40
End: 2025-05-19
Payer: COMMERCIAL

## 2025-06-06 ENCOUNTER — PATIENT MESSAGE (OUTPATIENT)
Dept: PSYCHIATRY | Facility: CLINIC | Age: 40
End: 2025-06-06
Payer: COMMERCIAL

## 2025-06-09 ENCOUNTER — PATIENT MESSAGE (OUTPATIENT)
Dept: PSYCHIATRY | Facility: CLINIC | Age: 40
End: 2025-06-09
Payer: COMMERCIAL

## 2025-06-09 ENCOUNTER — OFFICE VISIT (OUTPATIENT)
Dept: PSYCHIATRY | Facility: CLINIC | Age: 40
End: 2025-06-09
Payer: COMMERCIAL

## 2025-06-09 DIAGNOSIS — F41.1 GENERALIZED ANXIETY DISORDER WITH PANIC ATTACKS: Primary | ICD-10-CM

## 2025-06-09 DIAGNOSIS — F41.0 GENERALIZED ANXIETY DISORDER WITH PANIC ATTACKS: Primary | ICD-10-CM

## 2025-06-09 PROCEDURE — 90834 PSYTX W PT 45 MINUTES: CPT | Mod: 95,,, | Performed by: SOCIAL WORKER

## 2025-06-09 NOTE — PROGRESS NOTES
"The patient location is: [place]  The chief complaint leading to consultation is: anxiety, panic attacks    Visit type: audiovisual    Face to Face time with patient: 48 mins, 3:02 - 3:50pm  53 minutes of total time spent on the encounter, which includes face to face time and non-face to face time preparing to see the patient (eg, review of tests), Obtaining and/or reviewing separately obtained history, Documenting clinical information in the electronic or other health record, Independently interpreting results (not separately reported) and communicating results to the patient/family/caregiver, or Care coordination (not separately reported).         Each patient to whom he or she provides medical services by telemedicine is:  (1) informed of the relationship between the physician and patient and the respective role of any other health care provider with respect to management of the patient; and (2) notified that he or she may decline to receive medical services by telemedicine and may withdraw from such care at any time.    Individual Psychotherapy (PhD/LCSW)    6/9/2025    Therapeutic Intervention: Met with patient.  Outpatient - Insight oriented psychotherapy 45 min - CPT code 81461     Chief complaint/reason for encounter: anxiety, panic attacks     Interval history and content of current session:   King presented to session casually dressed and well-groomed. She started her new DMT and states her body is adjusting.  She was proud of herself for taking a trip with her friends and goddaughter and described how good it felt to be open and genuine with her friends.  Today, we spent time exploring her values in work and relationships as things that can guide her actions and choices versus acting solely based on how she feels, which is often fueled by anxiety.  In her work life she describes feeling easily frustrated by "negativity" among staff and isn't sure how to handle.  She identified wanting values of " "calmness, being trusting, and self-care to guide her at work.  In relationships, she wants to be compassionate, reliable, trusting, and genuine.  She described trusting as "trusting that others will see me for who I really am" and then also added being secure in how she sees herself. She may explore this same exercise for her health and leisure activities.     Treatment plan:  Target symptoms: anxiety , panic attacks  Why chosen therapy is appropriate versus another modality: relevant to diagnosis, patient responds to this modality  Outcome monitoring methods: self-report  Therapeutic intervention type: insight oriented psychotherapy    Risk parameters:  Patient reports no suicidal ideation  Patient reports no homicidal ideation  Patient reports no self-injurious behavior  Patient reports no violent behavior    Verbal deficits: None    Patient's response to intervention:  The patient's response to intervention is accepting.    Progress toward goals and other mental status changes:  The patient's progress toward goals is good.    Diagnosis:     ICD-10-CM ICD-9-CM   1. Generalized anxiety disorder with panic attacks  F41.1 300.02    F41.0 300.01       Plan:  individual psychotherapy    Return to clinic: 6 weeks    Length of Service (minutes): 48                          "

## 2025-06-19 ENCOUNTER — PATIENT MESSAGE (OUTPATIENT)
Dept: PSYCHIATRY | Facility: CLINIC | Age: 40
End: 2025-06-19
Payer: COMMERCIAL

## 2025-07-07 ENCOUNTER — OFFICE VISIT (OUTPATIENT)
Dept: PSYCHIATRY | Facility: CLINIC | Age: 40
End: 2025-07-07
Payer: COMMERCIAL

## 2025-07-07 ENCOUNTER — OFFICE VISIT (OUTPATIENT)
Dept: OBSTETRICS AND GYNECOLOGY | Facility: CLINIC | Age: 40
End: 2025-07-07
Payer: COMMERCIAL

## 2025-07-07 VITALS
WEIGHT: 126.13 LBS | HEIGHT: 65 IN | DIASTOLIC BLOOD PRESSURE: 69 MMHG | SYSTOLIC BLOOD PRESSURE: 110 MMHG | BODY MASS INDEX: 21.01 KG/M2 | HEART RATE: 74 BPM

## 2025-07-07 DIAGNOSIS — Z12.31 ENCOUNTER FOR SCREENING MAMMOGRAM FOR BREAST CANCER: ICD-10-CM

## 2025-07-07 DIAGNOSIS — Z01.419 WELL WOMAN EXAM: Primary | ICD-10-CM

## 2025-07-07 DIAGNOSIS — F41.1 GENERALIZED ANXIETY DISORDER WITH PANIC ATTACKS: Primary | ICD-10-CM

## 2025-07-07 DIAGNOSIS — Z12.4 ENCOUNTER FOR SCREENING FOR CERVICAL CANCER: ICD-10-CM

## 2025-07-07 DIAGNOSIS — Z30.41 ENCOUNTER FOR SURVEILLANCE OF CONTRACEPTIVE PILLS: ICD-10-CM

## 2025-07-07 DIAGNOSIS — Z30.09 ENCOUNTER FOR OTHER GENERAL COUNSELING OR ADVICE ON CONTRACEPTION: ICD-10-CM

## 2025-07-07 DIAGNOSIS — F41.0 GENERALIZED ANXIETY DISORDER WITH PANIC ATTACKS: Primary | ICD-10-CM

## 2025-07-07 PROCEDURE — 99999 PR PBB SHADOW E&M-EST. PATIENT-LVL III: CPT | Mod: PBBFAC,,,

## 2025-07-07 PROCEDURE — 90834 PSYTX W PT 45 MINUTES: CPT | Mod: 95,,, | Performed by: SOCIAL WORKER

## 2025-07-07 PROCEDURE — 88175 CYTOPATH C/V AUTO FLUID REDO: CPT | Mod: TC

## 2025-07-07 PROCEDURE — 87624 HPV HI-RISK TYP POOLED RSLT: CPT

## 2025-07-07 RX ORDER — NORETHINDRONE 0.35 MG/1
1 TABLET ORAL DAILY
Qty: 90 TABLET | Refills: 3 | Status: SHIPPED | OUTPATIENT
Start: 2025-07-07 | End: 2026-07-07

## 2025-07-07 NOTE — PROGRESS NOTES
The patient location is: [place]  The chief complaint leading to consultation is: anxiety, panic attacks    Visit type: audiovisual    Face to Face time with patient: 44 mins, 3:02 - 3:46pm  49 minutes of total time spent on the encounter, which includes face to face time and non-face to face time preparing to see the patient (eg, review of tests), Obtaining and/or reviewing separately obtained history, Documenting clinical information in the electronic or other health record, Independently interpreting results (not separately reported) and communicating results to the patient/family/caregiver, or Care coordination (not separately reported).         Each patient to whom he or she provides medical services by telemedicine is:  (1) informed of the relationship between the physician and patient and the respective role of any other health care provider with respect to management of the patient; and (2) notified that he or she may decline to receive medical services by telemedicine and may withdraw from such care at any time.    Individual Psychotherapy (PhD/LCSW)    7/7/2025    Therapeutic Intervention: Met with patient.  Outpatient - Insight oriented psychotherapy 45 min - CPT code 14201     Chief complaint/reason for encounter: anxiety, panic attacks     Interval history and content of current session:   King presented to session casually dressed and well-groomed.  She shared that she has been doing well and that she recently took a 5 day break from work.  She enjoyed kayaking, relaxing, and general self-care.  She continues to report some work related stress with a colleague who is on bedrest and other colleagues considering career changes.  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  She describes feeling under appreciated despite being very accommodating and supportive  to her employees.  She would like to focus on putting more energy into her friendships outside of work as she is not sure how to address the situation at work, currently.  She has also scheduled her own appointments for the next couple of months to ensure that she is taking good care of herself.    Treatment plan:  Target symptoms: anxiety , panic attacks  Why chosen therapy is appropriate versus another modality: relevant to diagnosis, patient responds to this modality  Outcome monitoring methods: self-report  Therapeutic intervention type: insight oriented psychotherapy    Risk parameters:  Patient reports no suicidal ideation  Patient reports no homicidal ideation  Patient reports no self-injurious behavior  Patient reports no violent behavior    Verbal deficits: None    Patient's response to intervention:  The patient's response to intervention is accepting.    Progress toward goals and other mental status changes:  The patient's progress toward goals is fair .    Diagnosis:     ICD-10-CM ICD-9-CM   1. Generalized anxiety disorder with panic attacks  F41.1 300.02    F41.0 300.01     Plan:  individual psychotherapy    Return to clinic: 1 month    Length of Service (minutes): 44

## 2025-07-07 NOTE — PROGRESS NOTES
History & Physical  Gynecology      SUBJECTIVE:     Chief Complaint: Well Woman         History of Present Illness:  King Peerz is a 40 y.o. female   for annual routine Pap and checkup. No LMP recorded (lmp unknown). (Menstrual status: Other)..  She is starting to notice hot flushes. Unsure if she is perimenopausal or if SE from new MS medication.        She describes her periods as absent due to micronor.  denies break through bleeding.   denies vaginal itching or irritation.  denies vaginal discharge.    She is sexually active with 1 male partner. Does not use condoms. Declines STI screening.  She uses oral progesterone-only contraceptive for contraception.    FH:   Breast cancer: mother-currently in her 60s-doing well. paternal GM-dx in her 60s  Colon cancer: none  Ovarian cancer: none  Uterine cancer: none    HPV vaccine: no, declines  Last pap smear: 3/2022 NILM/HPV NEG  History of abnormal pap smears: yes, years ago-colpo in early 20s    Colonoscopy: n/a  DEXA: n/a  Mammogram: 10/2024 Birads 1; TC score 8%  STD history: no  Birth control: POPs  OB history:  SAB w/D&C  Tobacco use: no    Pt also presents to discuss contraception. She was on OCPs prior to last year. She developed migraines with aura in  and had to switch to POPs. She Initially tried Slynd and had a negative experience. She developed acne and hair loss. She has since been on micronor. She has not had any major issues, but does not love how time sensitive it is. She would like to explore other options.    Review of Systems:  Review of Systems   Constitutional:  Negative for chills and fever.   Gastrointestinal:  Positive for constipation. Negative for abdominal pain, diarrhea, nausea and vomiting.   Genitourinary:  Negative for dysuria, flank pain, frequency, hematuria, pelvic pain, urgency and vaginal discharge.   Musculoskeletal:  Negative for back pain.   Skin:  Negative for rash.   Neurological:  Negative for  "headaches.        OBJECTIVE:   Vitals:     Vitals:    07/07/25 1103   BP: 110/69   Pulse: 74   Weight: 57.2 kg (126 lb 1.7 oz)   Height: 5' 5" (1.651 m)        Physical Exam:  Physical Exam  Constitutional:       General: She is not in acute distress.     Appearance: She is well-developed.   HENT:      Head: Normocephalic and atraumatic.   Pulmonary:      Effort: Pulmonary effort is normal.   Chest:   Breasts:     Breasts are symmetrical.      Right: Normal. No bleeding, inverted nipple, mass, nipple discharge, skin change or tenderness.      Left: Normal. No bleeding, inverted nipple, mass, nipple discharge, skin change or tenderness.   Abdominal:      General: There is no distension.      Palpations: Abdomen is soft. There is no mass.      Tenderness: There is no abdominal tenderness. There is no guarding or rebound.      Hernia: No hernia is present.   Genitourinary:     General: Normal vulva.      Exam position: Lithotomy position.      Pubic Area: No rash.       Labia:         Right: No rash or lesion.         Left: No rash or lesion.       Vagina: No vaginal discharge, tenderness or bleeding.      Cervix: Normal.      Uterus: Normal.       Adnexa: Right adnexa normal and left adnexa normal.      Comments: Normal external female genitalia; vagina rugated, normal; cervix normal, no masses; uterus small, mobile, nontender; no adnexal masses palpated; rectal deferred.    Musculoskeletal:         General: Normal range of motion.      Cervical back: Normal range of motion.   Lymphadenopathy:      Upper Body:      Right upper body: No supraclavicular or axillary adenopathy.      Left upper body: No supraclavicular or axillary adenopathy.   Neurological:      Mental Status: She is alert and oriented to person, place, and time.   Psychiatric:         Behavior: Behavior normal.         Thought Content: Thought content normal.         Judgment: Judgment normal.           ASSESSMENT:       ICD-10-CM ICD-9-CM    1. Well " "woman exam  Z01.419 V72.31       2. Encounter for screening for cervical cancer  Z12.4 V76.2 Liquid-Based Pap Smear, Screening      3. Encounter for screening mammogram for breast cancer  Z12.31 V76.12 Mammo Digital Screening Bilat w/ Vishal (XPD)      4. Encounter for surveillance of contraceptive pills  Z30.41 V25.41 norethindrone (MICRONOR) 0.35 mg tablet      5. Encounter for other general counseling or advice on contraception  Z30.09 V25.09                  Plan:      King was seen today for well woman.    Diagnoses and all orders for this visit:    Well woman exam  -pap updated  -STI screening declined  -contraception counseling provided. POPs sent  -gardasil counseling provided. Declines for now.  -MMG due in October. Orders placed  -CScope at 43  -DXA not indicated    Encounter for screening for cervical cancer  -     Liquid-Based Pap Smear, Screening    Encounter for screening mammogram for breast cancer  -     Mammo Digital Screening Bilat w/ Vishal (XPD); Future    Encounter for surveillance of contraceptive pills  -     norethindrone (MICRONOR) 0.35 mg tablet; Take 1 tablet (0.35 mg total) by mouth once daily.    Problem visit"  Encounter for other general counseling or advice on contraception    Counseled on options for birth control including combined oral contraceptive pills, patch, NuvaRing, progestin only options including progestin only pills, depo provera, nexplanon, IUD.  Patient is NOT a candidate for estrogen containing contraceptives. Counseled on r/b/i/a of each.  All questions answered. Written information was provided to patient. She desires POPs OR nexplanon. Would like to continue POPs at this time and do more research on nexplanon. Refills sent. She will reach out if she desires nexplanon.      Orders Placed This Encounter   Procedures    Mammo Digital Screening Bilat w/ Vishal (XPD)       Follow up in about 1 year (around 7/7/2026).      GREGORIO Vega  Obstetrics & Gynecology      "

## 2025-07-10 LAB
INSULIN SERPL-ACNC: NORMAL U[IU]/ML
LAB AP BETHESDA CATEGORY: NORMAL
LAB AP CLINICAL FINDINGS: NORMAL
LAB AP CONTRACEPTIVES: NORMAL
LAB AP OCHS PAP SPECIMEN ADEQUACY: NORMAL
LAB AP OHS PAP INTERPRETATION: NORMAL
LAB AP PAP DISCLAIMER COMMENTS: NORMAL
LAB AP PAP ESTROGEN REPLACEMENT THERAPY: NORMAL
LAB AP PAP PMP: NORMAL
LAB AP PAP PREVIOUS BX: NORMAL
LAB AP PAP PRIOR TREATMENT: NORMAL
LAB AP PERFORMING LOCATION(S): NORMAL

## 2025-07-14 ENCOUNTER — RESULTS FOLLOW-UP (OUTPATIENT)
Dept: OBSTETRICS AND GYNECOLOGY | Facility: CLINIC | Age: 40
End: 2025-07-14
Payer: COMMERCIAL

## 2025-07-14 DIAGNOSIS — Z30.017 ENCOUNTER FOR INITIAL PRESCRIPTION OF IMPLANTABLE SUBDERMAL CONTRACEPTIVE: Primary | ICD-10-CM

## 2025-07-21 ENCOUNTER — OFFICE VISIT (OUTPATIENT)
Dept: NEUROLOGY | Facility: CLINIC | Age: 40
End: 2025-07-21
Payer: COMMERCIAL

## 2025-07-21 VITALS
DIASTOLIC BLOOD PRESSURE: 73 MMHG | BODY MASS INDEX: 20.99 KG/M2 | HEART RATE: 70 BPM | WEIGHT: 126 LBS | HEIGHT: 65 IN | SYSTOLIC BLOOD PRESSURE: 104 MMHG

## 2025-07-21 DIAGNOSIS — G35 MS (MULTIPLE SCLEROSIS): Primary | ICD-10-CM

## 2025-07-21 DIAGNOSIS — Z79.899 LONG-TERM USE OF HIGH-RISK MEDICATION: ICD-10-CM

## 2025-07-21 DIAGNOSIS — E55.9 VITAMIN D DEFICIENCY: ICD-10-CM

## 2025-07-21 DIAGNOSIS — Z29.89 PROPHYLACTIC IMMUNOTHERAPY: ICD-10-CM

## 2025-07-21 DIAGNOSIS — N31.9 NEUROGENIC BLADDER: ICD-10-CM

## 2025-07-21 PROCEDURE — G2211 COMPLEX E/M VISIT ADD ON: HCPCS | Mod: S$GLB,,, | Performed by: PSYCHIATRY & NEUROLOGY

## 2025-07-21 PROCEDURE — 99999 PR PBB SHADOW E&M-EST. PATIENT-LVL IV: CPT | Mod: PBBFAC,,, | Performed by: PSYCHIATRY & NEUROLOGY

## 2025-07-21 PROCEDURE — 3074F SYST BP LT 130 MM HG: CPT | Mod: CPTII,S$GLB,, | Performed by: PSYCHIATRY & NEUROLOGY

## 2025-07-21 PROCEDURE — 1160F RVW MEDS BY RX/DR IN RCRD: CPT | Mod: CPTII,S$GLB,, | Performed by: PSYCHIATRY & NEUROLOGY

## 2025-07-21 PROCEDURE — 1159F MED LIST DOCD IN RCRD: CPT | Mod: CPTII,S$GLB,, | Performed by: PSYCHIATRY & NEUROLOGY

## 2025-07-21 PROCEDURE — 3078F DIAST BP <80 MM HG: CPT | Mod: CPTII,S$GLB,, | Performed by: PSYCHIATRY & NEUROLOGY

## 2025-07-21 PROCEDURE — 99214 OFFICE O/P EST MOD 30 MIN: CPT | Mod: S$GLB,,, | Performed by: PSYCHIATRY & NEUROLOGY

## 2025-07-21 PROCEDURE — 3008F BODY MASS INDEX DOCD: CPT | Mod: CPTII,S$GLB,, | Performed by: PSYCHIATRY & NEUROLOGY

## 2025-07-21 NOTE — PROGRESS NOTES
Subjective:          Patient ID: King Perez is a 40 y.o. female who presents today for a routine  visit for MS.      NEURO MULTIPLE SCLEROISIS SUMMARY:   Disease type currently:  Relapsing-Remitting MS  Current Therapy:  Ofatumumab       MS HPI:  DMT:  Kesimpta - just started maintenance dose  Side effects from DMT? Yes - flu like sx, but getting better   Taking vitamin D3 as recommended?  10,000 IU M-F   Feeling well; feels that she's fully recovered from the sensory relapse that affected her left side back in February;  Continues to exercise regularly  About to change her OCP -getting an implant  Taking doxy for a skin rash     Medications:  Current Outpatient Medications   Medication Sig    B complex-C-E-Zn Tab Take 1 tablet by mouth once daily.    fexofenadine (ALLEGRA) 180 MG tablet Take 1 tablet (180 mg total) by mouth once daily.    fluticasone propionate (FLONASE) 50 mcg/actuation nasal spray 2 sprays (100 mcg total) by Each Nostril route once daily.    norethindrone (MICRONOR) 0.35 mg tablet Take 1 tablet (0.35 mg total) by mouth once daily.    ofatumumab (KESIMPTA PEN) 20 mg/0.4 mL PnIj Inject 20 mg (1 pen) into the skin every 28 days starting at week 4    rizatriptan (MAXALT-MLT) 5 MG disintegrating tablet Take at onset of migraine, can repeat in 2 hrs if needed.  No more than 2 tabs per day or 3 days/wk.    semaglutide, weight loss, 0.25 mg/0.5 mL PnIj Inject 0.25 mg into the skin every 7 days.    traZODone (DESYREL) 50 MG tablet Take 1 to 1/2 tab po hs    valACYclovir (VALTREX) 1000 MG tablet TAKE 1 TABLET(1000 MG) BY MOUTH TWICE DAILY AS NEEDED    vitamin D 1000 units Tab Take 5,000 Units by mouth once daily.      No current facility-administered medications for this visit.       SOCIAL HISTORY  Social History[1]    Living arrangements - the patient lives with their spouse.            7/17/2025     7:10 PM   REVIEW OF SYMPTOMS   Do you feel abnormally tired on most days? No   Do you feel you  generally sleep well? Yes   Do you have difficulty controlling your bladder?  No   Do you have difficulty controlling your bowels?  No   Do you have frequent muscle cramps, tightness or spasms in your limbs?  No   Do you have new visual symptoms?  Yes - floaters    Do you have worsening difficulty with your memory or thinking? No   Do you have worsening symptoms of anxiety or depression?  No   For patients who walk, Do you have more difficulty walking?  No   Have you fallen since your last visit?  No   For patients who use wheelchairs: Do you have any skin wounds or breakdown? Not Applicable   Do you have difficulty using your hands?  No   Do you have shooting or burning pain? No   Do you have difficulty with sexual function?  No   If you are sexually active, are you using birth control? Y/N  N/A Yes   Do you often choke when swallowing liquids or solid food?  No   Do you experience worsening symptoms when overheated? No   Do you need any new equipment such as a wheelchair, walker or shower chair? No   Do you receive co-pay financial assistance for your principal MS medicine? Yes   Would you be interested in participating in an MS research trial in the future? No   For patients on Gilenya, Tecfidera, Aubagio, Rituxan, Ocrevus, Tysabri, Lemtrada or Methotrexate, are you aware that you should NOT receive live virus vaccines?  Not Applicable   Do you feel you have adequate family/friend support?  Yes   Do you have health insurance?   Yes   Are you currently employed? Yes   Do you receive SSDI/SSI?  Not Applicable   Do you use marijuana or cannabis products? Yes   How often? Monthly   Have you been diagnosed with a urinary tract infection since your last visit here? No   Have you been diagnosed with a respiratory tract infection since your last visit here? No   Have you been to the emergency room since your last visit here? No   Have you been hospitalized since your last visit here?  No           3/21/2025    11:22 AM    FSS SCORE & INTERPRETATION   FSS SCORE  26    FSS SCORE INTERPRETATION May not be suffering from fatigue        Patient-reported         3/21/2025    11:20 AM   MS DONOVAN-D SCORE & INTERPRETATION   DONOVAN-D SCORE  6    DONOVAN-D INTERPRETATION  No indication of Depression        Patient-reported         3/18/2025    10:17 AM   MS ARIEL-7 SCORE & INTERPRETATION   ARIEL-7 SCORE  5    ARIEL-7 SCORE INTERPRETATION Mild Anxiety        Patient-reported         3/24/2025     7:51 PM   PEQ MS MOS PAIN EFFECTS SCORE & INTERPRETATION   PES SCORE 7    PES SCORE INTERPRETATION Scores can range from 6-30.  Items are scaled so that higher scores indicate a greater impact of pain on a patients mood and behavior.        Patient-reported         3/24/2025     7:52 PM   PEQ MS SEXUAL SATISFACTION SCORE & INTERPRETATION   SSS SCORE  4    SSS SCORE INTERPRETATION Scores can range from 4-24.  Higher scores indicate greater problems with sexual satisfaction.        Patient-reported         3/24/2025     7:52 PM   MS BLADDER CONTROL SCORE & INTERPRETATION   BLCS SCORE 0    BLCS SCORE INTERPRETATION  Scores can range from 0-22, with higher scores indicating greater bladder control problems.        Patient-reported         3/24/2025     7:55 PM   MS BOWEL CONTROL SCORE & INTERPRETATION   BWCS SCORE 3    BWCS SCORE INTERPRETATION Scores can range from 0-26, with higher scores indicating greater bowel control problems.        Patient-reported         3/24/2025     7:53 PM   PEQ MS IMPACT OF VISUAL IMPAIRMENT SCORE & INTERPRETATION   VA SCALE SCORE  0    VA SCORE INTERPRETATION Scores can range from 0-15, with higher scores indicating greater impact of visual problems on daily activites.        Patient-reported         3/24/2025     7:54 PM   MS PDQ SCORE & INTERPRETATION   PDQ RETROSPECTIVE MEMORY SUBSCALE 6    PDQ ATTENTION/CONCENTRATION SUBSCALE 5    PDQ PROSPECTIVE MEMORY SUBSCALE 3    PDQ PLANNING/ORGANIZATION SUBSCALE 4    PDQ TOTAL SCORE 18     PDQ SCORE INTERPRETATION Scores can range from 0-80, with higher scores indicating greater perceived cognitive impairment.        Patient-reported         3/24/2025     7:56 PM   MSSS SCORE & INTERPRETATION   MSSS TANGIBLE SUPPORT SUBSCALE 93.75    MSSS EMOTIONAL/INFORMATIONAL SUPPORT SUBSCALE 100    MSSS AFFECTIONATE SUPPORT SUBSCALE 100    MSSS POSITIVE SOCIAL INTERACTION SUBSCALE 100    MSSS TOTAL SCORE 98.44    MSSS SCORE INTERPRETATION Scores can range from 0-100, with higher scores indicating greater perceived support.        Patient-reported               Objective:              3/25/2025     8:30 AM 7/21/2025     9:00 AM   Timed 25 Foot Walk:   Did patient wear an AFO? No No   Was assistive device used? No No   Time for 25 Foot Walk (seconds) 3.6 3.9   Time for 25 Foot Walk (seconds) 4 3.8         Neurological Exam    MENTAL STATUS: grossly intact  CRANIAL NERVE EXAM: There is no internuclear ophthalmoplegia. Extraocular   muscles are intact.  No facial   asymmetry.There is no dysarthria.   MOTOR EXAM: Normal bulk and tone throughout UE and LE bilaterally. Rapid sequential movements are normal. Strength is 5/5 in all groups   in the lower extremities and upper extremities.   REFLEXES: Symmetric and 1+ throughout in all 4 extremities.   SENSORY EXAM: Normal to vibration t/o  COORDINATION: Normal finger-to-nose exam.   GAIT: Narrow based and stable.  Imaging:     Results for orders placed during the hospital encounter of 05/06/24    MRI Brain Demyelinating Without Contrast    Impression  Brain appears unchanged from prior exam, again demonstrating findings compatible with the reported history of multiple sclerosis.  No new discrete lesions to indicate ongoing demyelination.    Electronically signed by resident: Omar Pickard  Date:    05/06/2024  Time:    11:41    Electronically signed by: Ac Barba MD  Date:    05/06/2024  Time:    12:06    Results for orders placed during the hospital encounter of  11/07/22    MRI Cervical Spine Demyelinating Without Contrast    Impression  Stable appearance of the lesions within the cervical cord with no new lesion.      Electronically signed by: Vipul Angeles  Date:    11/07/2022  Time:    15:46    Results for orders placed during the hospital encounter of 11/07/22    MRI Thoracic Spine Demyelinating Without Contrast    Impression  Stable lesions within the thoracic spinal cord with no new lesion identified.      Electronically signed by: Vipul Angeles  Date:    11/07/2022  Time:    15:51    Results for orders placed during the hospital encounter of 03/24/25    MRI Brain Demyelinating W W/O Contrast    Impression  Brain appears unchanged from prior exam, again demonstrating findings compatible with the reported history of multiple sclerosis.  No new or enhancing lesions to indicate ongoing or active demyelination.      Electronically signed by: Ac Barba MD  Date:    03/25/2025  Time:    09:08    Results for orders placed during the hospital encounter of 03/24/25    MRI Cervical Spine Demyelinating W W/O Contrast    Impression  Few scattered demyelinating lesions within the spinal cord, similar to prior.  No new discrete lesion or enhancement to suggest ongoing or active demyelination.    Apparent interval enlargement of T2 hyperintense focus in the liver, only partially visualized.  This is thought to likely reflect a simple cyst but incompletely characterized on today's study.  Liver ultrasound could be performed for further characterization.      Electronically signed by: Ac Barba MD  Date:    03/25/2025  Time:    09:17    Results for orders placed during the hospital encounter of 03/24/25    MRI Thoracic Spine Demyelinating W W/O Contrast    Impression  Few scattered demyelinating lesions within the spinal cord, similar to prior.  No new discrete lesion or enhancement to suggest ongoing or active demyelination.    Apparent interval enlargement of T2  hyperintense focus in the liver, only partially visualized.  This is thought to likely reflect a simple cyst but incompletely characterized on today's study.  Liver ultrasound could be performed for further characterization.      Electronically signed by: Ac Barba MD  Date:    03/25/2025  Time:    09:17        Labs:     Lab Results   Component Value Date    VKGAMLWK34UA 95 04/09/2024    DNHDEQDS15TB 67 11/14/2022    DXZKQIPI26UJ 81 10/25/2021     Lab Results   Component Value Date    JCVINDEX 1.52 (A) 02/06/2020    JCVANTIBODY Positive (A) 02/06/2020     @resufast(NEUROLGTCHN:3)   Lab Results   Component Value Date    BT0PWWLW 68.71 04/21/2025    ABSOLUTECD3 1,833 04/21/2025    VG8TWCJT 17.1 02/06/2020    ABSOLUTECD8 458 04/21/2025    CJ6PWQOT 55.7 02/06/2020    ABSOLUTECD4 1,361 04/21/2025    LABCD48 2.97 04/21/2025     Lab Results   Component Value Date    WBC 4.72 05/12/2025    RBC 3.55 (L) 05/12/2025    HGB 11.8 (L) 05/12/2025    HCT 35.1 (L) 05/12/2025    MCV 99 (H) 05/12/2025    MCH 33.2 (H) 05/12/2025    MCHC 33.6 05/12/2025    RDW 11.7 05/12/2025    PLT 85 (L) 05/12/2025    MPV 11.9 05/12/2025    GRAN 3.1 02/06/2025    GRAN 57.4 02/06/2025    LYMPH 45.1 05/12/2025    LYMPH 2.13 05/12/2025    MONO 4.9 05/12/2025    MONO 0.23 (L) 05/12/2025    EOS 1.1 05/12/2025    EOS 0.05 05/12/2025    BASO 0.03 02/06/2025    EOSINOPHIL 2.2 02/06/2025    BASOPHIL 1.1 05/12/2025    BASOPHIL 0.05 05/12/2025     Sodium   Date Value Ref Range Status   04/21/2025 139 136 - 145 mmol/L Final   02/06/2025 139 136 - 145 mmol/L Final     Potassium   Date Value Ref Range Status   04/21/2025 3.8 3.5 - 5.1 mmol/L Final   02/06/2025 3.9 3.5 - 5.1 mmol/L Final     Chloride   Date Value Ref Range Status   04/21/2025 106 95 - 110 mmol/L Final   02/06/2025 106 95 - 110 mmol/L Final     CO2   Date Value Ref Range Status   04/21/2025 26 23 - 29 mmol/L Final   02/06/2025 23 23 - 29 mmol/L Final     Glucose   Date Value Ref Range Status    04/21/2025 85 70 - 110 mg/dL Final   02/06/2025 77 70 - 110 mg/dL Final     BUN   Date Value Ref Range Status   04/21/2025 4 (L) 6 - 20 mg/dL Final     Creatinine   Date Value Ref Range Status   04/21/2025 0.6 0.5 - 1.4 mg/dL Final     Calcium   Date Value Ref Range Status   04/21/2025 9.3 8.7 - 10.5 mg/dL Final   02/06/2025 9.0 8.7 - 10.5 mg/dL Final     Protein Total   Date Value Ref Range Status   04/21/2025 7.3 6.0 - 8.4 gm/dL Final     Total Protein   Date Value Ref Range Status   02/06/2025 6.8 6.0 - 8.4 g/dL Final     Albumin   Date Value Ref Range Status   04/21/2025 4.1 3.5 - 5.2 g/dL Final   02/06/2025 4.0 3.5 - 5.2 g/dL Final     Total Bilirubin   Date Value Ref Range Status   02/06/2025 0.5 0.1 - 1.0 mg/dL Final     Comment:     For infants and newborns, interpretation of results should be based  on gestational age, weight and in agreement with clinical  observations.    Premature Infant recommended reference ranges:  Up to 24 hours.............<8.0 mg/dL  Up to 48 hours............<12.0 mg/dL  3-5 days..................<15.0 mg/dL  6-29 days.................<15.0 mg/dL       Bilirubin Total   Date Value Ref Range Status   04/21/2025 0.5 0.1 - 1.0 mg/dL Final     Comment:     For infants and newborns, interpretation of results should be based   on gestational age, weight and in agreement with clinical   observations.    Premature Infant recommended reference ranges:   0-24 hours:  <8.0 mg/dL   24-48 hours: <12.0 mg/dL   3-5 days:    <15.0 mg/dL   6-29 days:   <15.0 mg/dL     Alkaline Phosphatase   Date Value Ref Range Status   02/06/2025 41 40 - 150 U/L Final     ALP   Date Value Ref Range Status   04/21/2025 50 40 - 150 unit/L Final     AST   Date Value Ref Range Status   04/21/2025 21 11 - 45 unit/L Final   02/06/2025 17 10 - 40 U/L Final     ALT   Date Value Ref Range Status   04/21/2025 17 10 - 44 unit/L Final   02/06/2025 20 10 - 44 U/L Final     Anion Gap   Date Value Ref Range Status    04/21/2025 7 (L) 8 - 16 mmol/L Final     eGFR if    Date Value Ref Range Status   06/23/2022 >60.0 >60 mL/min/1.73 m^2 Final     eGFR if non    Date Value Ref Range Status   06/23/2022 >60.0 >60 mL/min/1.73 m^2 Final     Comment:     Calculation used to obtain the estimated glomerular filtration  rate (eGFR) is the CKD-EPI equation.        Lab Results   Component Value Date    HEPBSAG Non-Reactive 04/21/2025    HEPBSAB Reactive 04/21/2025    HEPBCAB Non-Reactive 04/21/2025           MS Impression and Plan:     NEURO MULTIPLE SCLEROSIS IMPRESSION:   Number of relapses in the past year?:  1  Clinical Progression:  Improved  MS Classification:  Relapsing-Remitting MS  Current DMT: ofatumumab  DMT:  No change in management  Symptom Management:  No change in symptom management  Additional Impressions: Tolerating Kesimpta   Labs in October  6 mo interval MRI in November  F/u [facility]ier[facility] in 6 mo             Problem List Items Addressed This Visit    None  Visit Diagnoses         MS (multiple sclerosis)    -  Primary    Relevant Orders    MRI Brain Demyelinating W W/O Contrast    CBC Auto Differential    Comprehensive Metabolic Panel    HCG, QUANTITATIVE, PREGNANCY    Hepatitis B Core Antibody, Total    Hepatitis B Surface Antigen    Immunoglobulins (IgG, IgA, IgM) Quantitative      Long-term use of high-risk medication        Relevant Orders    MRI Brain Demyelinating W W/O Contrast    CBC Auto Differential    Comprehensive Metabolic Panel    HCG, QUANTITATIVE, PREGNANCY    Hepatitis B Core Antibody, Total    Hepatitis B Surface Antigen    Immunoglobulins (IgG, IgA, IgM) Quantitative      Vitamin D deficiency        Relevant Orders    Vitamin D      Prophylactic immunotherapy          Neurogenic bladder                Marci MARYSOL Hernandez MD    I spent a total of 32 minutes on the day of the visit.This includes face to face time and non-face to face time preparing to see the patient (eg,  review of tests), obtaining and/or reviewing separately obtained history, documenting clinical information in the electronic or other health record, independently interpreting results and communicating results to the patient/family/caregiver, or care coordinator.  Visit today included increased complexity associated with the care of the episodic problem : chronic immunotherapy; addressed and managing the longitudinal care of the patient due to the serious and/or complex managed problem(s) MS.         [1]   Social History  Tobacco Use    Smoking status: Never     Passive exposure: Never    Smokeless tobacco: Never   Substance Use Topics    Alcohol use: Yes     Alcohol/week: 4.0 standard drinks of alcohol     Types: 4 Glasses of wine per week     Comment: socially    Drug use: Yes     Types: Marijuana

## 2025-07-22 ENCOUNTER — TELEPHONE (OUTPATIENT)
Dept: NEUROLOGY | Facility: CLINIC | Age: 40
End: 2025-07-22
Payer: COMMERCIAL

## 2025-07-22 NOTE — TELEPHONE ENCOUNTER
----- Message from Marci Hernandez MD sent at 7/21/2025  9:32 AM CDT -----  Doing well on Kesimpta; labs planned in October

## 2025-07-30 ENCOUNTER — PATIENT MESSAGE (OUTPATIENT)
Dept: PSYCHIATRY | Facility: CLINIC | Age: 40
End: 2025-07-30
Payer: COMMERCIAL

## 2025-07-31 ENCOUNTER — OFFICE VISIT (OUTPATIENT)
Dept: PSYCHIATRY | Facility: CLINIC | Age: 40
End: 2025-07-31
Payer: COMMERCIAL

## 2025-07-31 DIAGNOSIS — F41.0 GENERALIZED ANXIETY DISORDER WITH PANIC ATTACKS: Primary | ICD-10-CM

## 2025-07-31 DIAGNOSIS — F41.1 GENERALIZED ANXIETY DISORDER WITH PANIC ATTACKS: Primary | ICD-10-CM

## 2025-07-31 PROCEDURE — 90834 PSYTX W PT 45 MINUTES: CPT | Mod: 95,,, | Performed by: SOCIAL WORKER

## 2025-08-01 ENCOUNTER — PATIENT MESSAGE (OUTPATIENT)
Dept: PSYCHIATRY | Facility: CLINIC | Age: 40
End: 2025-08-01
Payer: COMMERCIAL

## 2025-08-18 ENCOUNTER — PROCEDURE VISIT (OUTPATIENT)
Dept: OBSTETRICS AND GYNECOLOGY | Facility: CLINIC | Age: 40
End: 2025-08-18
Payer: COMMERCIAL

## 2025-08-18 VITALS — SYSTOLIC BLOOD PRESSURE: 110 MMHG | BODY MASS INDEX: 21.13 KG/M2 | DIASTOLIC BLOOD PRESSURE: 65 MMHG | WEIGHT: 127 LBS

## 2025-08-18 DIAGNOSIS — Z30.017 INSERTION OF NEXPLANON: Primary | ICD-10-CM

## 2025-08-18 DIAGNOSIS — Z97.5 INTRAUTERINE CONTRACEPTIVE DEVICE: ICD-10-CM

## 2025-08-18 LAB
B-HCG UR QL: NEGATIVE
CTP QC/QA: YES

## 2025-08-18 PROCEDURE — 11981 INSERTION DRUG DLVR IMPLANT: CPT | Mod: S$GLB,,,

## 2025-08-18 PROCEDURE — 81025 URINE PREGNANCY TEST: CPT | Mod: S$GLB,,,

## 2025-08-18 PROCEDURE — 99499 UNLISTED E&M SERVICE: CPT | Mod: S$GLB,,,

## 2025-08-25 ENCOUNTER — OFFICE VISIT (OUTPATIENT)
Dept: PSYCHIATRY | Facility: CLINIC | Age: 40
End: 2025-08-25
Payer: COMMERCIAL

## 2025-08-25 DIAGNOSIS — F41.0 GENERALIZED ANXIETY DISORDER WITH PANIC ATTACKS: Primary | ICD-10-CM

## 2025-08-25 DIAGNOSIS — F41.1 GENERALIZED ANXIETY DISORDER WITH PANIC ATTACKS: Primary | ICD-10-CM

## 2025-08-25 PROCEDURE — 90837 PSYTX W PT 60 MINUTES: CPT | Mod: 95,,, | Performed by: SOCIAL WORKER

## (undated) DEVICE — BLADE SURG STAINLESS STEEL #10

## (undated) DEVICE — ELECTRODE NEEDLE 1IN

## (undated) DEVICE — SUT MCRYL PLUS 4-0 PS2 27IN

## (undated) DEVICE — TRAY CATH FOL SIL URIMTR 16FR

## (undated) DEVICE — Device

## (undated) DEVICE — SPONGE LAP 18X18 PREWASHED

## (undated) DEVICE — BAG DRAIN ANTI REFLUX 2000ML

## (undated) DEVICE — BANDAGE ROLL COTTN 4.5INX4.1YD

## (undated) DEVICE — STRIP MEDI WND CLSR 1/2X4IN

## (undated) DEVICE — ELECTRODE BLADE INSULATED 1 IN

## (undated) DEVICE — SKIN MARKER DEVON 160

## (undated) DEVICE — STAPLER SKIN ROTATING HEAD

## (undated) DEVICE — SUT VICRYL PLUS 2-0 CT1 18

## (undated) DEVICE — TIP YANKAUERS BULB NO VENT

## (undated) DEVICE — DRAPE THREE-QTR REINF 53X77IN

## (undated) DEVICE — SOL POVIDONE SCRUB IODINE 4 OZ

## (undated) DEVICE — MARKER SKIN STND TIP BLUE BARR

## (undated) DEVICE — TOWEL OR DISP STRL BLUE 4/PK

## (undated) DEVICE — ELECTRODE REM PLYHSV RETURN 9

## (undated) DEVICE — BLADE SURG STAINLESS STEEL #15

## (undated) DEVICE — SUT VICRYL PLUS 3-0 SH 18IN